# Patient Record
Sex: MALE | Race: WHITE | NOT HISPANIC OR LATINO | Employment: OTHER | URBAN - METROPOLITAN AREA
[De-identification: names, ages, dates, MRNs, and addresses within clinical notes are randomized per-mention and may not be internally consistent; named-entity substitution may affect disease eponyms.]

---

## 2017-03-16 PROBLEM — D04.61 CARCINOMA IN SITU OF SKIN OF RIGHT UPPER LIMB, INCLUDING SHOULDER: Status: ACTIVE | Noted: 2017-03-16

## 2018-12-10 ENCOUNTER — TELEPHONE (OUTPATIENT)
Dept: SCHEDULING | Facility: IMAGING CENTER | Age: 83
End: 2018-12-10

## 2019-01-08 ENCOUNTER — OFFICE VISIT (OUTPATIENT)
Dept: MEDICAL GROUP | Facility: PHYSICIAN GROUP | Age: 84
End: 2019-01-08
Payer: MEDICARE

## 2019-01-08 VITALS
OXYGEN SATURATION: 95 % | WEIGHT: 177 LBS | HEIGHT: 73 IN | TEMPERATURE: 98.9 F | SYSTOLIC BLOOD PRESSURE: 132 MMHG | BODY MASS INDEX: 23.46 KG/M2 | HEART RATE: 69 BPM | DIASTOLIC BLOOD PRESSURE: 70 MMHG | RESPIRATION RATE: 16 BRPM

## 2019-01-08 DIAGNOSIS — R97.20 ELEVATED PSA MEASUREMENT: ICD-10-CM

## 2019-01-08 DIAGNOSIS — Z13.1 ENCOUNTER FOR SCREENING EXAMINATION FOR IMPAIRED GLUCOSE REGULATION AND DIABETES MELLITUS: ICD-10-CM

## 2019-01-08 DIAGNOSIS — H61.22 IMPACTED CERUMEN OF LEFT EAR: ICD-10-CM

## 2019-01-08 DIAGNOSIS — E78.2 MIXED HYPERLIPIDEMIA: ICD-10-CM

## 2019-01-08 PROCEDURE — 99203 OFFICE O/P NEW LOW 30 MIN: CPT | Mod: 25 | Performed by: NURSE PRACTITIONER

## 2019-01-08 PROCEDURE — 69210 REMOVE IMPACTED EAR WAX UNI: CPT | Performed by: NURSE PRACTITIONER

## 2019-01-08 RX ORDER — ASPIRIN 81 MG/1
81 TABLET, CHEWABLE ORAL DAILY
Status: ON HOLD | COMMUNITY
End: 2022-11-01

## 2019-01-08 RX ORDER — SIMVASTATIN 80 MG
80 TABLET ORAL
Qty: 90 TAB | Refills: 3 | Status: SHIPPED | OUTPATIENT
Start: 2019-01-08 | End: 2020-01-03

## 2019-01-08 ASSESSMENT — PATIENT HEALTH QUESTIONNAIRE - PHQ9: CLINICAL INTERPRETATION OF PHQ2 SCORE: 0

## 2019-01-08 NOTE — PROGRESS NOTES
Chief Complaint   Patient presents with   • Labs Only       HISTORY OF THE PRESENT ILLNESS: This is a 83 y.o. male new patient to our practice. This pleasant patient is here today to establish care.     Patient would like to get routine labs. Last labs were done ~ 1 year ago. He notes taking 1 baby aspirin per day. He does report bruising easily which is minor and heals quickly. He has history of hyperlipidemia for which he is taking simvastatin. He requests for refill of this medication. Patient notes previously being evaluated by a urologist who informed him he had elevated PSA. Patient otherwise does not report any symptoms or complaints at this time and is feeling well overall. No chest pain, palpitations, shortness of breath, dizziness, or headaches.      Past Medical History:   Diagnosis Date   • Hyperlipidemia        Past Surgical History:   Procedure Laterality Date   • ATHROPLASTY     • HERNIA REPAIR      inguinal x3   • HIP REPLACEMENT, TOTAL Left        No family status information on file.   History reviewed. No pertinent family history.    Social History   Substance Use Topics   • Smoking status: Former Smoker     Types: Cigarettes     Quit date: 1/8/2003   • Smokeless tobacco: Never Used   • Alcohol use Yes      Comment: Occ        Allergies: Patient has no known allergies.    Current Outpatient Prescriptions Ordered in [facility]   Medication Sig Dispense Refill   • aspirin (ASA) 81 MG Chew Tab chewable tablet Take 81 mg by mouth every day.     • SUPER B COMPLEX/C PO Take  by mouth.     • Multiple Vitamins-Minerals (CENTRUM ADULTS PO) Take  by mouth.     • Cholecalciferol (D3-50 PO) Take  by mouth.     • simvastatin (ZOCOR) 80 MG tablet Take 1 Tab by mouth every bedtime. 90 Tab 3     No current Epic-ordered facility-administered medications on file.        Review of Systems   Constitutional: Negative for fever, chills, weight loss and malaise/fatigue.   Eyes: Negative for blurred vision.   Respiratory:  "Negative for cough, sputum production, shortness of breath and wheezing.    Cardiovascular: Negative for chest pain, palpitations, orthopnea and leg swelling.   Neurological: Negative for dizziness, tingling, tremors, sensory change, focal weakness and headaches.   Endo/Heme/Allergies: Does bruise easily (pt states this is minor and heals quickly).     All other systems reviewed and are negative except as in HPI.    Exam: Blood pressure 132/70, pulse 69, temperature 37.2 °C (98.9 °F), temperature source Temporal, resp. rate 16, height 1.854 m (6' 1\"), weight 80.3 kg (177 lb), SpO2 95 %.  General:  Normal appearing. No distress.  HEENT: Normocephalic. Eyes conjunctiva clear lids without ptosis, pupils equal and reactive to light accommodation, ears normal shape and contour, left ear canal impacted by cerumen, right ear canal is normal. Right TM is benign. Unable to visualize left TM due to cerumen impaction, nasal mucosa benign, oropharynx is without erythema, edema or exudates.   Neck: Supple without JVD. Thyroid is not enlarged.  Pulmonary:  Clear to ausculation.  Normal effort. No rales, ronchi, or wheezing.  Cardiovascular:  Regular rate and rhythm without murmur. Carotid and radial pulses are intact and equal bilaterally.  Neurologic:  Grossly nonfocal  Lymph: No cervical, supraclavicular or axillary lymph nodes are palpable  Skin:  Warm and dry.  No obvious lesions.  Musculoskeletal:  Normal gait. No extremity cyanosis, clubbing, or edema.  Psych:  Normal mood and affect. Alert and oriented x3. Judgment and insight is normal.      Procedure: Cerumen Removal  Risks and benefits of procedure discussed with patient.  Cerumen removed with  lavage   Patient tolerated the procedure well  Pt educated about proper care of ear canal. Q-tip cleaning discouraged, use of debrox and warm water lavage discussed.  Post lavage curette performed by provider, Post procedure exam with clear canal and normal TM.       PLAN:    1. " Mixed hyperlipidemia  - Ordering routine labs  - Counseled on diet and exercise  - Patient requests for refill of his simvastatin.  - Lipid Profile; Future  - simvastatin (ZOCOR) 80 MG tablet; Take 1 Tab by mouth every bedtime.  Dispense: 90 Tab; Refill: 3    2. Encounter for screening examination for impaired glucose regulation and diabetes mellitus  - Ordering routine labs  - Counseled on diet and exercise  - COMP METABOLIC PANEL; Future    3. Elevated PSA measurement  - Ordering updated labs  - PROSTATE SPECIFIC AG     4. Impacted cerumen of left ear  - Performed cerumen removal procedure, as outlined above.    Follow-up in 6 months or sooner as needed. Patient is encouraged to be seen in the emergency room for chest pain, palpitations, shortness of breath, dizziness, severe abdominal pain or other concerning symptoms.     Please note that this dictation was created using voice recognition software. I have made every reasonable attempt to correct obvious errors, but I expect that there are errors of grammar and possibly content that I did not discover before finalizing the note.      Assessment/Plan  1. Mixed hyperlipidemia  Lipid Profile    simvastatin (ZOCOR) 80 MG tablet   2. Encounter for screening examination for impaired glucose regulation and diabetes mellitus  COMP METABOLIC PANEL   3. Elevated PSA measurement  PROSTATE SPECIFIC AG   4. Impacted cerumen of left ear           I have placed the below orders and discussed them with an approved delegating provider. The MA is performing the below orders under the direction of Dr. Alvarez.       Peter PLEITEZ (Scribe), am scribing for, and in the presence of, BRICE Domingo    Electronically signed by: Peter Robin (Donnelle), 1/8/2019    Riley PLEITEZ APRN personally performed the services described in this documentation, as scribed by Peter Robin in my presence, and it is both accurate and complete.

## 2019-01-10 ENCOUNTER — HOSPITAL ENCOUNTER (OUTPATIENT)
Dept: LAB | Facility: MEDICAL CENTER | Age: 84
End: 2019-01-10
Attending: NURSE PRACTITIONER
Payer: MEDICARE

## 2019-01-10 DIAGNOSIS — Z13.1 ENCOUNTER FOR SCREENING EXAMINATION FOR IMPAIRED GLUCOSE REGULATION AND DIABETES MELLITUS: ICD-10-CM

## 2019-01-10 DIAGNOSIS — E78.2 MIXED HYPERLIPIDEMIA: ICD-10-CM

## 2019-01-10 LAB
ALBUMIN SERPL BCP-MCNC: 3.9 G/DL (ref 3.2–4.9)
ALBUMIN/GLOB SERPL: 1.3 G/DL
ALP SERPL-CCNC: 57 U/L (ref 30–99)
ALT SERPL-CCNC: 19 U/L (ref 2–50)
ANION GAP SERPL CALC-SCNC: 10 MMOL/L (ref 0–11.9)
AST SERPL-CCNC: 22 U/L (ref 12–45)
BILIRUB SERPL-MCNC: 0.7 MG/DL (ref 0.1–1.5)
BUN SERPL-MCNC: 19 MG/DL (ref 8–22)
CALCIUM SERPL-MCNC: 9 MG/DL (ref 8.5–10.5)
CHLORIDE SERPL-SCNC: 106 MMOL/L (ref 96–112)
CHOLEST SERPL-MCNC: 140 MG/DL (ref 100–199)
CO2 SERPL-SCNC: 26 MMOL/L (ref 20–33)
CREAT SERPL-MCNC: 0.9 MG/DL (ref 0.5–1.4)
FASTING STATUS PATIENT QL REPORTED: NORMAL
GLOBULIN SER CALC-MCNC: 2.9 G/DL (ref 1.9–3.5)
GLUCOSE SERPL-MCNC: 97 MG/DL (ref 65–99)
HDLC SERPL-MCNC: 51 MG/DL
LDLC SERPL CALC-MCNC: 78 MG/DL
POTASSIUM SERPL-SCNC: 3.7 MMOL/L (ref 3.6–5.5)
PROT SERPL-MCNC: 6.8 G/DL (ref 6–8.2)
SODIUM SERPL-SCNC: 142 MMOL/L (ref 135–145)
TRIGL SERPL-MCNC: 57 MG/DL (ref 0–149)

## 2019-01-10 PROCEDURE — 36415 COLL VENOUS BLD VENIPUNCTURE: CPT

## 2019-01-10 PROCEDURE — 80061 LIPID PANEL: CPT

## 2019-01-10 PROCEDURE — 80053 COMPREHEN METABOLIC PANEL: CPT

## 2019-01-11 LAB — PSA SERPL-MCNC: 13.2 NG/ML (ref 0–4)

## 2019-01-16 ENCOUNTER — TELEPHONE (OUTPATIENT)
Dept: MEDICAL GROUP | Facility: PHYSICIAN GROUP | Age: 84
End: 2019-01-16

## 2019-01-16 NOTE — TELEPHONE ENCOUNTER
----- Message from COLEEN Keller sent at 1/16/2019  7:23 AM PST -----  Please call pt and give lab results: Labs are 100% within normal limits.

## 2019-01-16 NOTE — TELEPHONE ENCOUNTER
Phone Number Called: [phone] (home)       Message: unable to leave a voicemail     Left Message for patient to call back: no

## 2019-01-16 NOTE — TELEPHONE ENCOUNTER
----- Message from COLEEN Keller sent at 1/16/2019 11:03 AM PST -----  Please call pt and give lab results: PSA is elevated 13.2. Is this consistent with previous numbers? I have not received your last PSA record yet.

## 2019-01-16 NOTE — TELEPHONE ENCOUNTER
Phone Number Called: [phone] (home)       Message: No answer, will try again later    Left Message for patient to call back: N\A

## 2020-01-03 DIAGNOSIS — E78.2 MIXED HYPERLIPIDEMIA: ICD-10-CM

## 2020-01-03 RX ORDER — SIMVASTATIN 80 MG
TABLET ORAL
Qty: 90 TAB | Refills: 0 | Status: SHIPPED | OUTPATIENT
Start: 2020-01-03 | End: 2020-04-03

## 2020-01-03 NOTE — TELEPHONE ENCOUNTER
Was the patient seen in the last year in this department? Yes    Does patient have an active prescription for medications requested? Yes    Received Request Via: Pharmacy     Future Appointments       Provider Department Center    1/9/2020 10:00 AM COLEEN Keller; [place] [facility] - [address]

## 2020-01-09 ENCOUNTER — APPOINTMENT (OUTPATIENT)
Dept: MEDICAL GROUP | Facility: PHYSICIAN GROUP | Age: 85
End: 2020-01-09
Payer: MEDICARE

## 2020-02-20 ENCOUNTER — OFFICE VISIT (OUTPATIENT)
Dept: MEDICAL GROUP | Facility: PHYSICIAN GROUP | Age: 85
End: 2020-02-20
Payer: MEDICARE

## 2020-02-20 VITALS
DIASTOLIC BLOOD PRESSURE: 72 MMHG | SYSTOLIC BLOOD PRESSURE: 132 MMHG | BODY MASS INDEX: 22.56 KG/M2 | OXYGEN SATURATION: 95 % | HEART RATE: 66 BPM | WEIGHT: 171 LBS | RESPIRATION RATE: 16 BRPM | TEMPERATURE: 97.6 F

## 2020-02-20 DIAGNOSIS — L98.9 NON-HEALING SKIN LESION: ICD-10-CM

## 2020-02-20 DIAGNOSIS — Z12.5 SCREENING FOR PROSTATE CANCER: ICD-10-CM

## 2020-02-20 DIAGNOSIS — E78.2 MIXED HYPERLIPIDEMIA: ICD-10-CM

## 2020-02-20 DIAGNOSIS — R97.20 ELEVATED PSA MEASUREMENT: ICD-10-CM

## 2020-02-20 DIAGNOSIS — Z00.00 MEDICARE ANNUAL WELLNESS VISIT, SUBSEQUENT: Primary | ICD-10-CM

## 2020-02-20 PROCEDURE — G0439 PPPS, SUBSEQ VISIT: HCPCS | Performed by: NURSE PRACTITIONER

## 2020-02-20 RX ORDER — OMEPRAZOLE 20 MG/1
20 CAPSULE, DELAYED RELEASE ORAL DAILY
COMMUNITY
End: 2024-01-01 | Stop reason: SDUPTHER

## 2020-02-20 ASSESSMENT — ENCOUNTER SYMPTOMS: GENERAL WELL-BEING: EXCELLENT

## 2020-02-20 ASSESSMENT — PATIENT HEALTH QUESTIONNAIRE - PHQ9: CLINICAL INTERPRETATION OF PHQ2 SCORE: 0

## 2020-02-20 ASSESSMENT — ACTIVITIES OF DAILY LIVING (ADL): BATHING_REQUIRES_ASSISTANCE: 0

## 2020-02-20 NOTE — PROGRESS NOTES
Chief Complaint   Patient presents with   • Annual Wellness Visit         HPI:  Moshe Guerrero is a 84 y.o. here for Medicare Annual Wellness Visit and to discuss the following:    Non-healing skin lesion  In 2004, the patient was carrying a small dog cage and tripped down a small flight of stairs causing him to fall into the cage. He has a small residual spot located to his upper chest that he is concerned about. He has noticed this spot has been changing in appearance and has never healed fully since his incident.    Elevated PSA measurement  His PSA value in 2016 was 5.4 and his last reading in 1/2019 was 13.2. He does not report any symptoms associated with an elevated PSA. He denies frequent urination at night.  He has not recently seen urology.     Mixed hyperlipidemia  He has been taking simvastatin 80 mg as prescribed for his dyslipidemia without myalgias or other side effects.       Patient Active Problem List    Diagnosis Date Noted   • Mixed hyperlipidemia 01/08/2019   • Impacted cerumen of left ear 01/08/2019   • Elevated PSA measurement 01/08/2019       Current Outpatient Medications   Medication Sig Dispense Refill   • omeprazole (PRILOSEC) 20 MG delayed-release capsule Take 20 mg by mouth every day.     • simvastatin (ZOCOR) 80 MG tablet TAKE  ONE TABLET BY MOUTH NIGHTLY AT BEDTIME 90 Tab 0   • aspirin (ASA) 81 MG Chew Tab chewable tablet Take 81 mg by mouth every day.     • SUPER B COMPLEX/C PO Take  by mouth.     • Multiple Vitamins-Minerals (CENTRUM ADULTS PO) Take  by mouth.     • Cholecalciferol (D3-50 PO) Take  by mouth.       No current facility-administered medications for this visit.             Current supplements as per medication list.       Allergies: Patient has no known allergies.    Current social contact/activities: Family Friends/      He  reports that he quit smoking about 17 years ago. His smoking use included cigarettes. He has never used smokeless tobacco. He reports current  alcohol use. He reports that he does not use drugs.  Counseling given: Yes      DPA/Advanced Directive:  Patient does not have an Advanced Directive.  A packet and workshop information was given on Advanced Directives.    ROS:    Gait: Uses no assistive device  Ostomy: No  Other tubes: No  Amputations: No  Chronic oxygen use: No  Last eye exam: 2010  Wears hearing aids: No   : Denies any urinary leakage during the last 6 months     Annual Health Assessment Questions:    1.  Are you currently engaging in any exercise or physical activity? No    2.  How would you describe your mood or emotional well-being today? good    3.  Have you had any falls in the last year? No    4.  Have you noticed any problems with your balance or had difficulty walking? No    5.  In the last six months have you experienced any leakage of urine? No    6. DPA/Advanced Directive: Patient has Advanced Directive, but it is not on file. Instructed to bring in a copy to scan into their chart.    Screening:  Annual Exam   Depression Screening    Little interest or pleasure in doing things?  0 - not at all  Feeling down, depressed , or hopeless? 0 - not at all  Patient Health Questionnaire Score: 0     If depressive symptoms identified deferred to follow up visit unless specifically addressed in assessment and plan.    Interpretation of PHQ-9 Total Score   Score Severity   1-4 No Depression   5-9 Mild Depression   10-14 Moderate Depression   15-19 Moderately Severe Depression   20-27 Severe Depression    Screening for Cognitive Impairment    Three Minute Recall (village, kitchen, baby) 3/3    Gabo clock face with all 12 numbers and set the hands to show 10 past 10.    Pt blind   Cognitive concerns identified deferred for follow up unless specifically addressed in assessment and plan.    Fall Risk Assessment    Has the patient had two or more falls in the last year or any fall with injury in the last year?  No    Safety Assessment    Throw rugs on  floor.  No  Handrails on all stairs.  Yes  Good lighting in all hallways.  Yes  Difficulty hearing.  No  Patient counseled about all safety risks that were identified.    Functional Assessment ADLs    Are there any barriers preventing you from cooking for yourself or meeting nutritional needs?  No.    Are there any barriers preventing you from driving safely or obtaining transportation?  No.    Are there any barriers preventing you from using a telephone or calling for help?  No.    Are there any barriers preventing you from shopping?  No.    Are there any barriers preventing you from taking care of your own finances?  No.    Are there any barriers preventing you from managing your medications?  No.    Are there any barriers preventing you from showering, bathing or dressing yourself?  No.    Are you currently engaging in any exercise or physical activity?  Yes.  Working out in the morning.     What is your perception of your health?  Excellent.      Health Maintenance Summary                Annual Wellness Visit Overdue 1935     IMM ZOSTER VACCINES Postponed 2020 Originally 9/15/1985. Patient Refused    IMM INFLUENZA Postponed 2021 Originally 2019. Patient Refused    IMM DTaP/Tdap/Td Vaccine Postponed 2021 Originally 9/15/1946. Patient Refused    IMM PNEUMOCOCCAL VACCINE: 65+ Years Postponed 2021 Originally 9/15/2000. Patient Refused    COLONOSCOPY Next Due 2023      Done 2013 REFERRAL TO GI FOR COLONOSCOPY          Patient Care Team:  COLEEN Keller as PCP - General (Family Medicine)        Social History     Tobacco Use   • Smoking status: Former Smoker     Types: Cigarettes     Last attempt to quit: 2003     Years since quittin.1   • Smokeless tobacco: Never Used   Substance Use Topics   • Alcohol use: Yes     Comment: Occ    • Drug use: No     History reviewed. No pertinent family history.  He  has a past medical history of Hyperlipidemia.    Past Surgical History:   Procedure Laterality Date   • ATHROPLASTY     • HERNIA REPAIR      inguinal x3   • HIP REPLACEMENT, TOTAL Left        Exam:   /72 (BP Location: Left arm, Patient Position: Sitting, BP Cuff Size: Adult)   Pulse 66   Temp 36.4 °C (97.6 °F) (Temporal)   Resp 16   Wt 77.6 kg (171 lb)   SpO2 95%  Body mass index is 22.56 kg/m².    Hearing fair.    Dentition fair  Alert, oriented in no acute distress.  Eye contact is good, speech goal directed, affect calm  Pulmonary:  Clear to ausculation.  Normal effort. No rales, ronchi, or wheezing.  Cardiovascular:  Regular rate and rhythm without murmur.  Skin: 2 cm x 1 cm scaly scabbed red lesion. There is another 0.5 red scaly non healing lesion inferior to the prior.     PLAN:    1. Non-healing skin lesion  New issue to me today. Patient is referred to dermatology for further assessment. There are concerns for possible basal cell or squamous carcinoma.  - REFERRAL TO DERMATOLOGY    2. Elevated PSA measurement  His PSA value in 2016 was 5.4 and his last reading in 1/2019 was 13.2. We will establish a new baseline and treat accordingly. Labs have been ordered for the next follow up visit.   - PROSTATE SPECIFIC AG SCREENING; Future    3. Mixed hyperlipidemia  His lipid panel values are all within goal from 1/2019. Stable on current simvastatin dosage. Labs have been ordered for the next follow up visit.    - Comp Metabolic Panel; Future  - Lipid Profile; Future    4. Screening for prostate cancer  See #2. Screening lab work ordered.   - PROSTATE SPECIFIC AG SCREENING; Future     Patient is encouraged to be seen in the emergency room for chest pain, palpitations, shortness of breath, dizziness, severe abdominal pain or other concerning symptoms.     Assessment and Plan. The following treatment and monitoring plan is recommended:    1. Non-healing skin lesion  REFERRAL TO DERMATOLOGY   2. Elevated PSA measurement  PROSTATE SPECIFIC AG SCREENING    3. Mixed hyperlipidemia  Comp Metabolic Panel    Lipid Profile   4. Screening for prostate cancer  PROSTATE SPECIFIC AG SCREENING         Services suggested: No services needed at this time  Health Care Screening: Age-appropriate preventive services recommended by USPTF and ACIP covered by Medicare were discussed today. Services ordered if indicated and agreed upon by the patient.  Referrals offered: Community-based lifestyle interventions to reduce health risks and promote self-management and wellness, fall prevention, nutrition, physical activity, tobacco-use cessation, weight loss, and mental health services as per orders if indicated.    Discussion today about general wellness and lifestyle habits:    · Prevent falls and reduce trip hazards; Cautioned about securing or removing rugs.  · Have a working fire alarm and carbon monoxide detector;   · Engage in regular physical activity and social activities     Follow-up: No follow-ups on file.

## 2020-03-06 LAB
ALBUMIN SERPL-MCNC: 4.2 G/DL (ref 3.6–4.6)
ALBUMIN/GLOB SERPL: 1.8 {RATIO} (ref 1.2–2.2)
ALP SERPL-CCNC: 75 IU/L (ref 39–117)
ALT SERPL-CCNC: 18 IU/L (ref 0–44)
AST SERPL-CCNC: 23 IU/L (ref 0–40)
BILIRUB SERPL-MCNC: 0.5 MG/DL (ref 0–1.2)
BUN SERPL-MCNC: 23 MG/DL (ref 8–27)
BUN/CREAT SERPL: 28 (ref 10–24)
CALCIUM SERPL-MCNC: 8.9 MG/DL (ref 8.6–10.2)
CHLORIDE SERPL-SCNC: 105 MMOL/L (ref 96–106)
CHOLEST SERPL-MCNC: 160 MG/DL (ref 100–199)
CO2 SERPL-SCNC: 22 MMOL/L (ref 20–29)
CREAT SERPL-MCNC: 0.82 MG/DL (ref 0.76–1.27)
GLOBULIN SER CALC-MCNC: 2.4 G/DL (ref 1.5–4.5)
GLUCOSE SERPL-MCNC: 98 MG/DL (ref 65–99)
HDLC SERPL-MCNC: 53 MG/DL
LABORATORY COMMENT REPORT: NORMAL
LDLC SERPL CALC-MCNC: 93 MG/DL (ref 0–99)
POTASSIUM SERPL-SCNC: 4.5 MMOL/L (ref 3.5–5.2)
PROT SERPL-MCNC: 6.6 G/DL (ref 6–8.5)
PSA SERPL-MCNC: 14.8 NG/ML (ref 0–4)
SODIUM SERPL-SCNC: 141 MMOL/L (ref 134–144)
TRIGL SERPL-MCNC: 70 MG/DL (ref 0–149)
VLDLC SERPL CALC-MCNC: 14 MG/DL (ref 5–40)

## 2020-03-09 ENCOUNTER — TELEPHONE (OUTPATIENT)
Dept: MEDICAL GROUP | Facility: PHYSICIAN GROUP | Age: 85
End: 2020-03-09

## 2020-03-09 DIAGNOSIS — R97.20 ELEVATED PSA: ICD-10-CM

## 2020-03-09 NOTE — TELEPHONE ENCOUNTER
VOICEMAIL  1. Caller Name: Bo Howell                      Call Back Number: [phone]    2. Message: CB about Labs           Phone Number Called: [phone]        Call outcome: Spoke to Sherrie Howell     Message: Informed Sherrie, Pt's results per Riley.  Informed of Urology referral.  Informed [facility] [place] will call and set up appt.  Sherrie understood, no questions at this time.

## 2020-03-09 NOTE — TELEPHONE ENCOUNTER
----- Message from COLEEN Keller sent at 3/9/2020  9:39 AM PDT -----  Please call PATIENTS DAUGHTER and give lab results: Overall labs look great but the PSA is increased from last year as such I am sending a referral to urology.

## 2020-03-09 NOTE — TELEPHONE ENCOUNTER
Phone Number Called: [phone] [place]      Call outcome: Did not leave a detailed message. Requested patient to call back.    Message: LVM to call back.

## 2020-03-12 NOTE — TELEPHONE ENCOUNTER
Phone Number Called: [phone] Kristie             Call outcome: Spoke to patient regarding message below.    Message: Spoke with patient's niece Lina and let them know COLEEN Keller's message.

## 2020-03-20 ENCOUNTER — APPOINTMENT (RX ONLY)
Dept: URBAN - METROPOLITAN AREA CLINIC 20 | Facility: CLINIC | Age: 85
Setting detail: DERMATOLOGY
End: 2020-03-20

## 2020-03-20 DIAGNOSIS — D22 MELANOCYTIC NEVI: ICD-10-CM

## 2020-03-20 DIAGNOSIS — D18.0 HEMANGIOMA: ICD-10-CM

## 2020-03-20 DIAGNOSIS — L81.4 OTHER MELANIN HYPERPIGMENTATION: ICD-10-CM

## 2020-03-20 DIAGNOSIS — L82.1 OTHER SEBORRHEIC KERATOSIS: ICD-10-CM

## 2020-03-20 DIAGNOSIS — L57.0 ACTINIC KERATOSIS: ICD-10-CM

## 2020-03-20 PROBLEM — D22.5 MELANOCYTIC NEVI OF TRUNK: Status: ACTIVE | Noted: 2020-03-20

## 2020-03-20 PROBLEM — D48.5 NEOPLASM OF UNCERTAIN BEHAVIOR OF SKIN: Status: ACTIVE | Noted: 2020-03-20

## 2020-03-20 PROBLEM — D18.01 HEMANGIOMA OF SKIN AND SUBCUTANEOUS TISSUE: Status: ACTIVE | Noted: 2020-03-20

## 2020-03-20 PROCEDURE — 17003 DESTRUCT PREMALG LES 2-14: CPT

## 2020-03-20 PROCEDURE — 99203 OFFICE O/P NEW LOW 30 MIN: CPT | Mod: 25

## 2020-03-20 PROCEDURE — 11102 TANGNTL BX SKIN SINGLE LES: CPT

## 2020-03-20 PROCEDURE — 17000 DESTRUCT PREMALG LESION: CPT | Mod: 59

## 2020-03-20 PROCEDURE — ? BIOPSY BY SHAVE METHOD

## 2020-03-20 PROCEDURE — 11103 TANGNTL BX SKIN EA SEP/ADDL: CPT

## 2020-03-20 PROCEDURE — ? LIQUID NITROGEN

## 2020-03-20 PROCEDURE — ? COUNSELING

## 2020-03-20 ASSESSMENT — LOCATION DETAILED DESCRIPTION DERM
LOCATION DETAILED: RIGHT SUPERIOR LATERAL MIDBACK
LOCATION DETAILED: LEFT MEDIAL SUPERIOR CHEST
LOCATION DETAILED: RIGHT SUPERIOR LATERAL MALAR CHEEK
LOCATION DETAILED: LEFT PROXIMAL DORSAL FOREARM
LOCATION DETAILED: STERNUM
LOCATION DETAILED: LEFT INFERIOR UPPER BACK
LOCATION DETAILED: RIGHT MEDIAL INFERIOR CHEST
LOCATION DETAILED: LEFT INFERIOR CENTRAL MALAR CHEEK
LOCATION DETAILED: RIGHT LATERAL MALAR CHEEK

## 2020-03-20 ASSESSMENT — LOCATION SIMPLE DESCRIPTION DERM
LOCATION SIMPLE: LEFT CHEEK
LOCATION SIMPLE: CHEST
LOCATION SIMPLE: RIGHT CHEEK
LOCATION SIMPLE: LEFT FOREARM
LOCATION SIMPLE: LEFT UPPER BACK
LOCATION SIMPLE: RIGHT LOWER BACK

## 2020-03-20 ASSESSMENT — LOCATION ZONE DERM
LOCATION ZONE: FACE
LOCATION ZONE: TRUNK
LOCATION ZONE: ARM

## 2020-03-20 NOTE — PROCEDURE: BIOPSY BY SHAVE METHOD

## 2020-04-03 DIAGNOSIS — E78.2 MIXED HYPERLIPIDEMIA: ICD-10-CM

## 2020-04-06 RX ORDER — SIMVASTATIN 80 MG
TABLET ORAL
Qty: 90 TAB | Refills: 2 | Status: SHIPPED | OUTPATIENT
Start: 2020-04-06 | End: 2021-01-18

## 2020-12-14 ENCOUNTER — TELEPHONE (OUTPATIENT)
Dept: MEDICAL GROUP | Facility: PHYSICIAN GROUP | Age: 85
End: 2020-12-14

## 2020-12-14 DIAGNOSIS — E78.2 MIXED HYPERLIPIDEMIA: ICD-10-CM

## 2020-12-14 DIAGNOSIS — R97.20 ELEVATED PSA: ICD-10-CM

## 2020-12-14 DIAGNOSIS — Z13.1 ENCOUNTER FOR SCREENING EXAMINATION FOR IMPAIRED GLUCOSE REGULATION AND DIABETES MELLITUS: ICD-10-CM

## 2020-12-15 NOTE — TELEPHONE ENCOUNTER
VOICEMAIL  1. Caller Name: Deidre Howell                      Call Back Number: [phone] (home)     2. Message: Called for Uncle, has just scheduled Pt for Annual exam in February, he has three labs ordered from last year, to  in Feb.  Did PCP want to add any new orders to go with the old, and would PCP like to have the labs done prior to Annual

## 2021-01-11 DIAGNOSIS — Z23 NEED FOR VACCINATION: ICD-10-CM

## 2021-01-17 DIAGNOSIS — E78.2 MIXED HYPERLIPIDEMIA: ICD-10-CM

## 2021-01-18 RX ORDER — SIMVASTATIN 80 MG
TABLET ORAL
Qty: 90 TAB | Refills: 0 | Status: SHIPPED | OUTPATIENT
Start: 2021-01-18 | End: 2021-04-12

## 2021-01-19 NOTE — TELEPHONE ENCOUNTER
Pt's contact Deidre called office requesting refill for simvastatin for Pt. Deidre called back and informed prescripton refill has been sent to designated pharmacy. She verbalized understanding.

## 2021-02-18 ENCOUNTER — HOSPITAL ENCOUNTER (OUTPATIENT)
Dept: LAB | Facility: MEDICAL CENTER | Age: 86
End: 2021-02-18
Attending: NURSE PRACTITIONER
Payer: MEDICARE

## 2021-02-18 DIAGNOSIS — R97.20 ELEVATED PSA: ICD-10-CM

## 2021-02-18 DIAGNOSIS — Z13.1 ENCOUNTER FOR SCREENING EXAMINATION FOR IMPAIRED GLUCOSE REGULATION AND DIABETES MELLITUS: ICD-10-CM

## 2021-02-18 DIAGNOSIS — E78.2 MIXED HYPERLIPIDEMIA: ICD-10-CM

## 2021-02-18 LAB
ALBUMIN SERPL BCP-MCNC: 3.7 G/DL (ref 3.2–4.9)
ALBUMIN/GLOB SERPL: 1.1 G/DL
ALP SERPL-CCNC: 95 U/L (ref 30–99)
ALT SERPL-CCNC: 10 U/L (ref 2–50)
ANION GAP SERPL CALC-SCNC: 11 MMOL/L (ref 7–16)
AST SERPL-CCNC: 18 U/L (ref 12–45)
BILIRUB SERPL-MCNC: 0.5 MG/DL (ref 0.1–1.5)
BUN SERPL-MCNC: 16 MG/DL (ref 8–22)
CALCIUM SERPL-MCNC: 9 MG/DL (ref 8.5–10.5)
CHLORIDE SERPL-SCNC: 104 MMOL/L (ref 96–112)
CHOLEST SERPL-MCNC: 145 MG/DL (ref 100–199)
CO2 SERPL-SCNC: 22 MMOL/L (ref 20–33)
CREAT SERPL-MCNC: 0.64 MG/DL (ref 0.5–1.4)
GLOBULIN SER CALC-MCNC: 3.5 G/DL (ref 1.9–3.5)
GLUCOSE SERPL-MCNC: 98 MG/DL (ref 65–99)
HDLC SERPL-MCNC: 56 MG/DL
LDLC SERPL CALC-MCNC: 76 MG/DL
POTASSIUM SERPL-SCNC: 3.9 MMOL/L (ref 3.6–5.5)
PROT SERPL-MCNC: 7.2 G/DL (ref 6–8.2)
PSA SERPL-MCNC: 22.4 NG/ML (ref 0–4)
SODIUM SERPL-SCNC: 137 MMOL/L (ref 135–145)
TRIGL SERPL-MCNC: 67 MG/DL (ref 0–149)

## 2021-02-18 PROCEDURE — 36415 COLL VENOUS BLD VENIPUNCTURE: CPT

## 2021-02-18 PROCEDURE — 80061 LIPID PANEL: CPT

## 2021-02-18 PROCEDURE — 84153 ASSAY OF PSA TOTAL: CPT

## 2021-02-18 PROCEDURE — 80053 COMPREHEN METABOLIC PANEL: CPT

## 2021-02-19 ENCOUNTER — TELEPHONE (OUTPATIENT)
Dept: MEDICAL GROUP | Facility: PHYSICIAN GROUP | Age: 86
End: 2021-02-19

## 2021-02-19 NOTE — TELEPHONE ENCOUNTER
Left message for patient to call back regarding pre-visit planning. Please transfer call to [phone].

## 2021-02-19 NOTE — TELEPHONE ENCOUNTER
Left message for patient to call back regarding pre-visit planning. Please transfer call to [phone].

## 2021-02-22 ENCOUNTER — OFFICE VISIT (OUTPATIENT)
Dept: MEDICAL GROUP | Facility: PHYSICIAN GROUP | Age: 86
End: 2021-02-22
Payer: MEDICARE

## 2021-02-22 VITALS
SYSTOLIC BLOOD PRESSURE: 144 MMHG | DIASTOLIC BLOOD PRESSURE: 60 MMHG | HEART RATE: 69 BPM | RESPIRATION RATE: 20 BRPM | BODY MASS INDEX: 19.74 KG/M2 | OXYGEN SATURATION: 98 % | TEMPERATURE: 98.5 F | WEIGHT: 153.8 LBS | HEIGHT: 74 IN

## 2021-02-22 DIAGNOSIS — E78.2 MIXED HYPERLIPIDEMIA: ICD-10-CM

## 2021-02-22 DIAGNOSIS — Z00.00 MEDICARE ANNUAL WELLNESS VISIT, SUBSEQUENT: Primary | ICD-10-CM

## 2021-02-22 DIAGNOSIS — R97.20 ELEVATED PSA MEASUREMENT: ICD-10-CM

## 2021-02-22 DIAGNOSIS — Z91.81 RISK FOR FALLS: ICD-10-CM

## 2021-02-22 PROCEDURE — G0439 PPPS, SUBSEQ VISIT: HCPCS | Performed by: NURSE PRACTITIONER

## 2021-02-22 PROCEDURE — 69210 REMOVE IMPACTED EAR WAX UNI: CPT | Performed by: NURSE PRACTITIONER

## 2021-02-22 ASSESSMENT — PATIENT HEALTH QUESTIONNAIRE - PHQ9: CLINICAL INTERPRETATION OF PHQ2 SCORE: 0

## 2021-02-22 ASSESSMENT — ACTIVITIES OF DAILY LIVING (ADL): BATHING_REQUIRES_ASSISTANCE: 0

## 2021-02-22 ASSESSMENT — ENCOUNTER SYMPTOMS: GENERAL WELL-BEING: GOOD

## 2021-02-22 NOTE — ASSESSMENT & PLAN NOTE
States one fall with a fast turn. States he is overall doing well and his cane keeps him steady. Daughter states his gait is steady.

## 2021-02-22 NOTE — PROGRESS NOTES
Chief Complaint   Patient presents with   • Annual Wellness Visit         HPI:  Moshe is a 85 y.o. here for Medicare Annual Wellness Visit    Risk for falls  States one fall with a fast turn. States he is overall doing well and his cane keeps him steady. Daughter states his gait is steady.     Mixed hyperlipidemia  Chronic in nature. Stable. States he is overall doing well.     Elevated PSA measurement  Chronic in nature. Stable. States he saw urology a couple years ago, reported not to be concerned unless it jumps up suddenly. Will monitor.        Patient Active Problem List    Diagnosis Date Noted   • Risk for falls 02/22/2021   • Mixed hyperlipidemia 01/08/2019   • Impacted cerumen of left ear 01/08/2019   • Elevated PSA measurement 01/08/2019       Current Outpatient Medications   Medication Sig Dispense Refill   • simvastatin (ZOCOR) 80 MG tablet TAKE  ONE TABLET BY MOUTH NIGHTLY AT BEDTIME 90 Tab 0   • omeprazole (PRILOSEC) 20 MG delayed-release capsule Take 20 mg by mouth every day.     • aspirin (ASA) 81 MG Chew Tab chewable tablet Take 81 mg by mouth every day.     • SUPER B COMPLEX/C PO Take  by mouth.     • Multiple Vitamins-Minerals (CENTRUM ADULTS PO) Take  by mouth.     • Cholecalciferol (D3-50 PO) Take  by mouth.       No current facility-administered medications for this visit.        Patient is taking medications as noted in medication list.  Current supplements as per medication list.     Allergies: Patient has no known allergies.    Current social contact/activities: Talking to friends and family by the phone     Is patient current with immunizations? No, due for COVID 19. Patient is interested in receiving NONE today.    He  reports that he quit smoking about 18 years ago. His smoking use included cigarettes. He has never used smokeless tobacco. He reports current alcohol use. He reports that he does not use drugs.  Counseling given: Not Answered        DPA/Advanced directive: Patient does not  have an Advanced Directive.  A packet and workshop information was given on Advanced Directives.    ROS:    Gait: Uses a cane   Ostomy: No   Other tubes: No   Amputations: No   Chronic oxygen use No   Last eye exam 2004   Wears hearing aids: No   : Reports urinary leakage during the last 6 months that has somewhat interfered with their daily activities or sleep.      Screening:        Depression Screening    Little interest or pleasure in doing things?  0 - not at all  Feeling down, depressed, or hopeless? 0 - not at all  Trouble falling or staying asleep, or sleeping too much?     Feeling tired or having little energy?     Poor appetite or overeating?     Feeling bad about yourself - or that you are a failure or have let yourself or your family down?    Trouble concentrating on things, such as reading the newspaper or watching television?    Moving or speaking so slowly that other people could have noticed.  Or the opposite - being so fidgety or restless that you have been moving around a lot more than usual?     Thoughts that you would be better off dead, or of hurting yourself?     Patient Health Questionnaire Score:        If depressive symptoms identified deferred to follow up visit unless specifically addressed in assessment and plan.    Interpretation of PHQ-9 Total Score   Score Severity   1-4 No Depression   5-9 Mild Depression   10-14 Moderate Depression   15-19 Moderately Severe Depression   20-27 Severe Depression      Screening for Cognitive Impairment    Three Minute Recall (river, gladys, finger)  2/3    Draw clock face with all 12 numbers and set the hands to show 10 past 11.  No Pt unable to see, Macular Dengeneration   If cognitive concerns identified, deferred for follow up unless specifically addressed in assessment and plan.    Fall Risk Assessment    Has the patient had two or more falls in the last year or any fall with injury in the last year?  Yes  If fall risk identified, deferred for  follow up unless specifically addressed in assessment and plan.      Safety Assessment    Throw rugs on floor.  Yes  Handrails on all stairs.  Yes  Good lighting in all hallways.  Yes  Difficulty hearing.  No  Patient counseled about all safety risks that were identified.    Functional Assessment ADLs    Are there any barriers preventing you from cooking for yourself or meeting nutritional needs?  No.    Are there any barriers preventing you from driving safely or obtaining transportation?  No.    Are there any barriers preventing you from using a telephone or calling for help?  No.    Are there any barriers preventing you from shopping?  No.    Are there any barriers preventing you from taking care of your own finances?  No.    Are there any barriers preventing you from managing your medications?    No.    Are there any barriers preventing you from showering, bathing or dressing yourself?  No.    Are you currently engaging in any exercise or physical activity?  Yes.  Has a PT exercise routine daily, doing chores around the house    What is your perception of your health?  Good.    Health Maintenance Summary                COVID-19 Vaccine Overdue 9/15/1951     Annual Wellness Visit Overdue 2/20/2021      Done 2/20/2020 LOS: OK ANNUAL WELLNESS VISIT-INCLUDES PPPS SUBSEQUE*     Patient has more history with this topic...    IMM ZOSTER VACCINES Postponed 7/22/2021 Originally 9/15/1985. Patient Refused    IMM INFLUENZA Postponed 2/22/2022 Originally 9/1/2020. Patient Refused    IMM DTaP/Tdap/Td Vaccine Postponed 2/22/2022 Originally 9/15/1954. Patient Refused    IMM PNEUMOCOCCAL VACCINE: 65+ Years Postponed 2/22/2022 Originally 9/15/2000. Patient Refused    COLONOSCOPY Next Due 9/26/2023      Done 9/26/2013 REFERRAL TO GI FOR COLONOSCOPY          Patient Care Team:  THEA KellerPEduardoREduardoN. as PCP - General (Family Medicine)      Social History     Tobacco Use   • Smoking status: Former Smoker      "Types: Cigarettes     Quit date: 2003     Years since quittin.1   • Smokeless tobacco: Never Used   Substance Use Topics   • Alcohol use: Yes     Comment: Occ    • Drug use: No     No family history on file.  He  has a past medical history of Hyperlipidemia.   Past Surgical History:   Procedure Laterality Date   • ARTHROPLASTY     • HERNIA REPAIR      inguinal x3   • HIP REPLACEMENT, TOTAL Left          Exam:     /60 (BP Location: Left arm, Patient Position: Sitting, BP Cuff Size: Adult)   Pulse 69   Temp 36.9 °C (98.5 °F) (Temporal)   Resp 20   Ht 1.867 m (6' 1.5\")   Wt 69.8 kg (153 lb 12.8 oz)   SpO2 98%  Body mass index is 20.02 kg/m².    Hearing excellent.    Dentition good  Alert, oriented in no acute distress.  Eye contact is good, speech goal directed, affect calm    Procedure: Cerumen Removal  Risks and benefits of procedure discussed with patient.  Cerumen removed with  lavage   Patient tolerated the procedure well  Pt educated about proper care of ear canal. Q-tip cleaning discouraged, use of debrox and warm water lavage discussed.  Post lavage curette performed by provider, Post procedure exam with clear canal and normal TM.      Assessment and Plan. The following treatment and monitoring plan is recommended:  Continue current follow-up.   1. Risk for falls  Patient identified as fall risk.  Appropriate orders and counseling given.   2. Elevated PSA measurement     3. Mixed hyperlipidemia           Services suggested: No services needed at this time  Health Care Screening recommendations as per orders if indicated.  Referrals offered: PT/OT/Nutrition counseling/Behavioral Health/Smoking cessation as per orders if indicated.    Discussion today about general wellness and lifestyle habits:    · Prevent falls and reduce trip hazards; Cautioned about securing or removing rugs.  · Have a working fire alarm and carbon monoxide detector;   · Engage in regular physical activity and social " activities       Follow-up: Return in about 6 months (around 8/22/2021), or if symptoms worsen or fail to improve.

## 2021-02-22 NOTE — ASSESSMENT & PLAN NOTE
Chronic in nature. Stable. States he saw urology a couple years ago, reported not to be concerned unless it jumps up suddenly. Will monitor.

## 2021-04-07 ENCOUNTER — TELEPHONE (OUTPATIENT)
Dept: MEDICAL GROUP | Facility: PHYSICIAN GROUP | Age: 86
End: 2021-04-07

## 2021-04-07 NOTE — TELEPHONE ENCOUNTER
Phone Number Called: [phone]    Call outcome: spoke with yuan Mills, [employer] on account    Message: Informed niece we do not keep the vaccines in office as they have a specific storage requirement.  Informed her would need to set the appt up through [employer], and since the Pt did not have [employer] provided her with [employer] support phone number to see if they could help her schedule the Pt for his vaccine. Transferred her call over to [employer] Support.  No further actions required.

## 2021-04-07 NOTE — TELEPHONE ENCOUNTER
[employer]IC[employer]  1. Caller Name: yuan Howell                      Call Back Number: [phone]    2. Message: Called to say Uncle wants to have the COVID vaccine, and wanted to know if he could have it done in office here.  She asked for a cb.

## 2021-04-13 ENCOUNTER — IMMUNIZATION (OUTPATIENT)
Dept: FAMILY PLANNING/WOMEN'S HEALTH CLINIC | Facility: IMMUNIZATION CENTER | Age: 86
End: 2021-04-13
Payer: MEDICARE

## 2021-04-13 DIAGNOSIS — Z23 ENCOUNTER FOR VACCINATION: Primary | ICD-10-CM

## 2021-04-13 PROCEDURE — 0001A PFIZER SARS-COV-2 VACCINE: CPT

## 2021-04-13 PROCEDURE — 91300 PFIZER SARS-COV-2 VACCINE: CPT

## 2021-05-06 ENCOUNTER — IMMUNIZATION (OUTPATIENT)
Dept: FAMILY PLANNING/WOMEN'S HEALTH CLINIC | Facility: IMMUNIZATION CENTER | Age: 86
End: 2021-05-06
Payer: MEDICARE

## 2021-05-06 DIAGNOSIS — Z23 ENCOUNTER FOR VACCINATION: Primary | ICD-10-CM

## 2021-05-06 PROCEDURE — 0002A PFIZER SARS-COV-2 VACCINE: CPT | Performed by: INTERNAL MEDICINE

## 2021-05-06 PROCEDURE — 91300 PFIZER SARS-COV-2 VACCINE: CPT | Performed by: INTERNAL MEDICINE

## 2021-10-20 DIAGNOSIS — E78.2 MIXED HYPERLIPIDEMIA: ICD-10-CM

## 2021-10-21 RX ORDER — SIMVASTATIN 80 MG
TABLET ORAL
Qty: 90 TABLET | Refills: 0 | Status: SHIPPED | OUTPATIENT
Start: 2021-10-21 | End: 2022-04-28

## 2022-02-22 ENCOUNTER — TELEPHONE (OUTPATIENT)
Dept: MEDICAL GROUP | Facility: PHYSICIAN GROUP | Age: 87
End: 2022-02-22
Payer: MEDICARE

## 2022-02-22 DIAGNOSIS — Z12.5 SCREENING FOR PROSTATE CANCER: ICD-10-CM

## 2022-02-22 DIAGNOSIS — R97.20 ELEVATED PSA MEASUREMENT: ICD-10-CM

## 2022-02-22 DIAGNOSIS — E78.2 MIXED HYPERLIPIDEMIA: ICD-10-CM

## 2022-02-22 NOTE — TELEPHONE ENCOUNTER
VOIC[employer]AIL  1. Caller Name: Bo Howell                      Call Back Number: [phone]    2. Message: patients niece called requesting yearly labs before patients upcoming appointment on 3/1    Please advise

## 2022-02-23 NOTE — TELEPHONE ENCOUNTER
Patient's family member know that labs are ordered for patient to complete prior to his appointment.

## 2022-03-28 ENCOUNTER — HOSPITAL ENCOUNTER (OUTPATIENT)
Dept: LAB | Facility: MEDICAL CENTER | Age: 87
End: 2022-03-28
Attending: NURSE PRACTITIONER
Payer: MEDICARE

## 2022-03-28 DIAGNOSIS — R97.20 ELEVATED PSA MEASUREMENT: ICD-10-CM

## 2022-03-28 DIAGNOSIS — Z12.5 SCREENING FOR PROSTATE CANCER: ICD-10-CM

## 2022-03-28 DIAGNOSIS — E78.2 MIXED HYPERLIPIDEMIA: ICD-10-CM

## 2022-03-28 LAB
ALBUMIN SERPL BCP-MCNC: 4.4 G/DL (ref 3.2–4.9)
ALBUMIN/GLOB SERPL: 1.8 G/DL
ALP SERPL-CCNC: 91 U/L (ref 30–99)
ALT SERPL-CCNC: 13 U/L (ref 2–50)
ANION GAP SERPL CALC-SCNC: 12 MMOL/L (ref 7–16)
AST SERPL-CCNC: 20 U/L (ref 12–45)
BILIRUB SERPL-MCNC: 0.6 MG/DL (ref 0.1–1.5)
BUN SERPL-MCNC: 17 MG/DL (ref 8–22)
CALCIUM SERPL-MCNC: 9 MG/DL (ref 8.5–10.5)
CHLORIDE SERPL-SCNC: 105 MMOL/L (ref 96–112)
CHOLEST SERPL-MCNC: 165 MG/DL (ref 100–199)
CO2 SERPL-SCNC: 24 MMOL/L (ref 20–33)
CREAT SERPL-MCNC: 0.76 MG/DL (ref 0.5–1.4)
FASTING STATUS PATIENT QL REPORTED: NORMAL
GFR SERPLBLD CREATININE-BSD FMLA CKD-EPI: 87 ML/MIN/1.73 M 2
GLOBULIN SER CALC-MCNC: 2.5 G/DL (ref 1.9–3.5)
GLUCOSE SERPL-MCNC: 82 MG/DL (ref 65–99)
HDLC SERPL-MCNC: 52 MG/DL
LDLC SERPL CALC-MCNC: 100 MG/DL
POTASSIUM SERPL-SCNC: 4.6 MMOL/L (ref 3.6–5.5)
PROT SERPL-MCNC: 6.9 G/DL (ref 6–8.2)
PSA SERPL-MCNC: 26.9 NG/ML (ref 0–4)
SODIUM SERPL-SCNC: 141 MMOL/L (ref 135–145)
TRIGL SERPL-MCNC: 67 MG/DL (ref 0–149)

## 2022-03-28 PROCEDURE — 80061 LIPID PANEL: CPT

## 2022-03-28 PROCEDURE — 84153 ASSAY OF PSA TOTAL: CPT | Mod: GA

## 2022-03-28 PROCEDURE — 80053 COMPREHEN METABOLIC PANEL: CPT

## 2022-03-28 PROCEDURE — 36415 COLL VENOUS BLD VENIPUNCTURE: CPT | Mod: GA

## 2022-03-29 ENCOUNTER — OFFICE VISIT (OUTPATIENT)
Dept: MEDICAL GROUP | Facility: PHYSICIAN GROUP | Age: 87
End: 2022-03-29
Payer: MEDICARE

## 2022-03-29 VITALS
WEIGHT: 166.4 LBS | RESPIRATION RATE: 16 BRPM | HEIGHT: 72 IN | OXYGEN SATURATION: 100 % | BODY MASS INDEX: 22.54 KG/M2 | SYSTOLIC BLOOD PRESSURE: 120 MMHG | TEMPERATURE: 97.8 F | HEART RATE: 64 BPM | DIASTOLIC BLOOD PRESSURE: 48 MMHG

## 2022-03-29 DIAGNOSIS — R97.20 ELEVATED PSA MEASUREMENT: ICD-10-CM

## 2022-03-29 DIAGNOSIS — E78.2 MIXED HYPERLIPIDEMIA: ICD-10-CM

## 2022-03-29 DIAGNOSIS — Z00.00 MEDICARE ANNUAL WELLNESS VISIT, SUBSEQUENT: Primary | ICD-10-CM

## 2022-03-29 PROCEDURE — G0439 PPPS, SUBSEQ VISIT: HCPCS | Performed by: NURSE PRACTITIONER

## 2022-03-29 ASSESSMENT — PATIENT HEALTH QUESTIONNAIRE - PHQ9: CLINICAL INTERPRETATION OF PHQ2 SCORE: 0

## 2022-03-29 ASSESSMENT — ENCOUNTER SYMPTOMS: GENERAL WELL-BEING: GOOD

## 2022-03-29 ASSESSMENT — ACTIVITIES OF DAILY LIVING (ADL): BATHING_REQUIRES_ASSISTANCE: 0

## 2022-03-29 NOTE — PROGRESS NOTES
Chief Complaint   Patient presents with   • Annual Wellness Visit       HPI:  Moshe is a 86 y.o. here for Medicare Annual Wellness Visit    Problem   Mixed Hyperlipidemia    Chronic in nature.  Well controlled.  Ricky continues to take 80 mg of simvastatin p.o. nightly without side effects.  Last labs indicate excellent numbers.     Elevated Psa Measurement    Chronic in nature increase in PSA.  Patient has followed with urology in the past and is currently monitoring this with yearly PSAs.      Risk for Falls (Resolved)         Patient Active Problem List    Diagnosis Date Noted   • Mixed hyperlipidemia 01/08/2019   • Impacted cerumen of left ear 01/08/2019   • Elevated PSA measurement 01/08/2019       Current Outpatient Medications   Medication Sig Dispense Refill   • simvastatin (ZOCOR) 80 MG tablet TAKE  ONE TABLET BY MOUTH NIGHTLY AT BEDTIME 90 Tablet 0   • omeprazole (PRILOSEC) 20 MG delayed-release capsule Take 20 mg by mouth every day.     • aspirin (ASA) 81 MG Chew Tab chewable tablet Take 81 mg by mouth every day.     • SUPER B COMPLEX/C PO Take  by mouth.     • Multiple Vitamins-Minerals (CENTRUM ADULTS PO) Take  by mouth.     • Cholecalciferol (D3-50 PO) Take  by mouth.       No current facility-administered medications for this visit.        Patient is taking medications as noted in medication list.  Current supplements as per medication list.     Allergies: Patient has no known allergies.    Current social contact/activities: Keeping in touch with friends and family, by phone and in person visits     Is patient current with immunizations? No, due for FLU. Patient is interested in receiving NONE today.    He  reports that he quit smoking about 19 years ago. His smoking use included cigarettes. He has never used smokeless tobacco. He reports current alcohol use. He reports that he does not use drugs.  Counseling given: Yes      DPA/Advanced directive: Patient has Advanced Directive, but it is not on file.  Instructed to bring in a copy to scan into their chart.    ROS:    Gait: Uses a cane   Ostomy: No   Other tubes: No   Amputations: No   Chronic oxygen use No   Last eye exam 2014   Wears hearing aids: No   : Denies any urinary leakage during the last 6 months      Screening:    Depression Screening  Little interest or pleasure in doing things?  0 - not at all  Feeling down, depressed, or hopeless? 0 - not at all  Trouble falling or staying asleep, or sleeping too much?     Feeling tired or having little energy?     Poor appetite or overeating?     Feeling bad about yourself - or that you are a failure or have let yourself or your family down?    Trouble concentrating on things, such as reading the newspaper or watching television?    Moving or speaking so slowly that other people could have noticed.  Or the opposite - being so fidgety or restless that you have been moving around a lot more than usual?     Thoughts that you would be better off dead, or of hurting yourself?     Patient Health Questionnaire Score:      If depressive symptoms identified deferred to follow up visit unless specifically addressed in assessment and plan.    Interpretation of PHQ-9 Total Score   Score Severity   1-4 No Depression   5-9 Mild Depression   10-14 Moderate Depression   15-19 Moderately Severe Depression   20-27 Severe Depression      Screening for Cognitive Impairment  Three Minute Recall (daughter, heaven, sheyla)  2/3    Draw clock face with all 12 numbers and set the hands to show 10 past 11.  Yes 0/5, Pt sight impaired   If cognitive concerns identified, deferred for follow up unless specifically addressed in assessment and plan.    Fall Risk Assessment  Has the patient had two or more falls in the last year or any fall with injury in the last year?  Yes  If fall risk identified, deferred for follow up unless specifically addressed in assessment and plan.    Safety Assessment  Throw rugs on floor.  No  Handrails on all  stairs.  Yes  Good lighting in all hallways.  Yes  Difficulty hearing.  No  Patient counseled about all safety risks that were identified.    Functional Assessment ADLs  Are there any barriers preventing you from cooking for yourself or meeting nutritional needs?  No.    Are there any barriers preventing you from driving safely or obtaining transportation?  No.    Are there any barriers preventing you from using a telephone or calling for help?  No.    Are there any barriers preventing you from shopping?  No.    Are there any barriers preventing you from taking care of your own finances?  No.    Are there any barriers preventing you from managing your medications?    No.    Are there any barriers preventing you from showering, bathing or dressing yourself?  No.    Are you currently engaging in any exercise or physical activity?  No.     What is your perception of your health?  Good.    Health Maintenance Summary          Overdue - IMM DTaP/Tdap/Td Vaccine (1 - Tdap) Overdue - never done    No completion history exists for this topic.          Overdue - IMM ZOSTER VACCINES (1 of 2) Overdue - never done    No completion history exists for this topic.          Overdue - IMM PNEUMOCOCCAL VACCINE: 65+ Years (1 - PCV) Overdue - never done    No completion history exists for this topic.          Overdue - IMM INFLUENZA (1) Overdue - never done    No completion history exists for this topic.          Annual Wellness Visit (Every 366 Days) Next due on 3/30/2023    03/29/2022  Visit Dx: Medicare annual wellness visit, subsequent    03/29/2022  Level of Service: ANNUAL WELLNESS VISIT-INCLUDES PPPS SUBSEQUE*    02/22/2021  Visit Dx: Medicare annual wellness visit, subsequent    02/22/2021  Level of Service: [place] ANNUAL WELLNESS VISIT-INCLUDES PPPS SUBSEQUE*    02/20/2020  Level of Service: [place] ANNUAL WELLNESS VISIT-INCLUDES PPPS SUBSEQUE*    Only the first 5 history entries have been loaded, but more history exists.           COLORECTAL CANCER SCREENING (COLONOSCOPY - Every 10 Years) Tentatively due on 2013  REFERRAL TO GI FOR COLONOSCOPY          COVID-19 Vaccine (Series Information) Completed    2021  Imm Admin: Pfizer SARS-CoV-2 Vaccine 12+    2021  Imm Admin: Pfizer SARS-CoV-2 Vaccine    2021  Imm Admin: Pfizer SARS-CoV-2 Vaccine          IMM HEP B VACCINE (Series Information) Aged Out    No completion history exists for this topic.          IMM MENINGOCOCCAL VACCINE (MCV4) (Series Information) Aged Out    No completion history exists for this topic.                Patient Care Team:  COLEEN Keller as PCP - General (Family Medicine)    Social History     Tobacco Use   • Smoking status: Former Smoker     Types: Cigarettes     Quit date: 2003     Years since quittin.2   • Smokeless tobacco: Never Used   Vaping Use   • Vaping Use: Never used   Substance Use Topics   • Alcohol use: Yes     Comment: Occ    • Drug use: No     History reviewed. No pertinent family history.  He  has a past medical history of Hyperlipidemia.   Past Surgical History:   Procedure Laterality Date   • ARTHROPLASTY     • HERNIA REPAIR      inguinal x3   • HIP REPLACEMENT, TOTAL Left          Exam:   /48 (BP Location: Left arm, Patient Position: Sitting, BP Cuff Size: Adult)   Pulse 64   Temp 36.6 °C (97.8 °F) (Temporal)   Resp 16   Ht 1.829 m (6')   Wt 75.5 kg (166 lb 6.4 oz)   SpO2 100%  Body mass index is 22.57 kg/m².    Hearing good.    Dentition fair  Alert, oriented in no acute distress  Eye contact is good, speech goal directed, affect calm      Assessment and Plan. The following treatment and monitoring plan is recommended:      Problem List Items Addressed This Visit     Elevated PSA measurement     -continue yearly PSA         Mixed hyperlipidemia     -Continue simvastatin 80 mg p.o. daily.           Other Visit Diagnoses     Medicare annual wellness visit, subsequent    -   Primary             Services suggested: No services needed at this time  Health Care Screening recommendations as per orders if indicated.  Referrals offered: PT/OT/Nutrition counseling/Behavioral Health/Smoking cessation as per orders if indicated.    Discussion today about general wellness and lifestyle habits:    · Prevent falls and reduce trip hazards; Cautioned about securing or removing rugs.  · Have a working fire alarm and carbon monoxide detector;   · Engage in regular physical activity and social activities.     Follow-up: Return in about 6 months (around 9/29/2022), or if symptoms worsen or fail to improve, for Medication Review.

## 2022-03-31 PROBLEM — Z91.81 RISK FOR FALLS: Status: RESOLVED | Noted: 2021-02-22 | Resolved: 2022-03-31

## 2022-04-28 DIAGNOSIS — E78.2 MIXED HYPERLIPIDEMIA: ICD-10-CM

## 2022-04-28 RX ORDER — SIMVASTATIN 80 MG
TABLET ORAL
Qty: 100 TABLET | Refills: 3 | Status: SHIPPED | OUTPATIENT
Start: 2022-04-28 | End: 2023-01-01

## 2022-09-01 ENCOUNTER — OFFICE VISIT (OUTPATIENT)
Dept: MEDICAL GROUP | Facility: PHYSICIAN GROUP | Age: 87
End: 2022-09-01
Payer: MEDICARE

## 2022-09-01 VITALS
BODY MASS INDEX: 20.32 KG/M2 | HEIGHT: 72 IN | TEMPERATURE: 99.1 F | SYSTOLIC BLOOD PRESSURE: 110 MMHG | HEART RATE: 83 BPM | WEIGHT: 150 LBS | DIASTOLIC BLOOD PRESSURE: 56 MMHG | OXYGEN SATURATION: 96 %

## 2022-09-01 DIAGNOSIS — M25.561 CHRONIC PAIN OF RIGHT KNEE: ICD-10-CM

## 2022-09-01 DIAGNOSIS — M25.461 EFFUSION, RIGHT KNEE: ICD-10-CM

## 2022-09-01 DIAGNOSIS — H61.23 BILATERAL IMPACTED CERUMEN: ICD-10-CM

## 2022-09-01 DIAGNOSIS — G89.29 CHRONIC PAIN OF RIGHT KNEE: ICD-10-CM

## 2022-09-01 PROCEDURE — 69210 REMOVE IMPACTED EAR WAX UNI: CPT | Performed by: NURSE PRACTITIONER

## 2022-09-01 PROCEDURE — 99213 OFFICE O/P EST LOW 20 MIN: CPT | Mod: 25 | Performed by: NURSE PRACTITIONER

## 2022-09-02 PROBLEM — H61.23 BILATERAL IMPACTED CERUMEN: Status: ACTIVE | Noted: 2019-01-08

## 2022-09-02 ASSESSMENT — ENCOUNTER SYMPTOMS
PALPITATIONS: 0
COUGH: 0
HEADACHES: 0
DIZZINESS: 0
DEPRESSION: 0
FEVER: 0
FALLS: 0
CHILLS: 0
SHORTNESS OF BREATH: 0

## 2022-09-02 NOTE — PROGRESS NOTES
Subjective:     Chief Complaint   Patient presents with    Knee Pain     Right knee; warm to the touch       HPI:   Moshe presents today with     Problem   Chronic Pain of Right Knee    Chronic in nature. Fell down the stairs at work multiple years ago. States previously he would be able to do exercises which would improve the pain. States right hip would improve. States saw chiropractor leg length discrepancy, did an adjustment just over 2 years ago. States that hip pain was better and now the knee is more painful significantly worse over the last few months. Previously the tylenol would help. States taking 3000 mg tylenol daily. States feels warm to the touch over the last 2 years.      Effusion, Right Knee   Bilateral Impacted Cerumen    Bilateral cerumen impaction is noted today.         Current Outpatient Medications Ordered in Epic   Medication Sig Dispense Refill    simvastatin (ZOCOR) 80 MG tablet TAKE  ONE TABLET BY MOUTH NIGHTLY AT BEDTIME 100 Tablet 3    omeprazole (PRILOSEC) 20 MG delayed-release capsule Take 20 mg by mouth every day.      aspirin (ASA) 81 MG Chew Tab chewable tablet Take 81 mg by mouth every day.      SUPER B COMPLEX/C PO Take  by mouth.      Multiple Vitamins-Minerals (CENTRUM ADULTS PO) Take  by mouth.      Cholecalciferol (D3-50 PO) Take  by mouth.       No current Epic-ordered facility-administered medications on file.       Health Maintenance: declines vaccines today    Review of Systems   Constitutional:  Negative for chills, fever and malaise/fatigue.   Respiratory:  Negative for cough and shortness of breath.    Cardiovascular:  Negative for chest pain, palpitations and leg swelling.   Musculoskeletal:  Positive for joint pain. Negative for falls.   Neurological:  Negative for dizziness and headaches.   Psychiatric/Behavioral:  Negative for depression and suicidal ideas.        Objective:     Exam:  /56 (BP Location: Left arm, Patient Position: Sitting, BP Cuff Size: Adult)    Pulse 83   Temp 37.3 °C (99.1 °F) (Temporal)   Ht 1.829 m (6')   Wt 68 kg (150 lb)   SpO2 96%   BMI 20.34 kg/m²  Body mass index is 20.34 kg/m².    Physical Exam  Constitutional:       Appearance: Normal appearance.   HENT:      Head: Normocephalic and atraumatic.   Cardiovascular:      Rate and Rhythm: Normal rate and regular rhythm.      Pulses: Normal pulses.      Heart sounds: Normal heart sounds.   Pulmonary:      Effort: Pulmonary effort is normal.      Breath sounds: Normal breath sounds.   Musculoskeletal:      Right knee: Swelling and effusion present. No deformity, erythema, ecchymosis, lacerations or crepitus. Normal range of motion. Tenderness present. Normal alignment. Normal pulse.      Instability Tests: Anterior drawer test negative. Posterior drawer test negative.        Legs:       Comments: Pain most severe at circled location.   Skin:     General: Skin is warm and dry.   Neurological:      General: No focal deficit present.      Mental Status: He is alert and oriented to person, place, and time.     Assessment & Plan:     86 y.o. male with the following -     Problem List Items Addressed This Visit       Bilateral impacted cerumen    Chronic pain of right knee     - Noted effusion, no instability noted on assessment no evidence of infection  -Recommended over-the-counter medications for inflammation including topical Aspercreme, Arnica gel, or Voltaren  -We discussed narcotic pain medications but did discuss extensively the risk of fatigue, nausea vomiting with these medications as patient has had multiple issues with medications causing vomiting.  On extensive discussion of risks, side effects plan is to try over-the-counter topical medications and follow-up with orthopedics for further evaluation.  We did discuss that [facility] urgent care is available if they prefer to go that route.  Patient and family did not want to do imaging through our office as they were concerned  about having to repeat the imaging once they were seen by an orthopedic specialist         Relevant Orders    Referral to Orthopedics    Effusion, right knee    Relevant Orders    Referral to Orthopedics     Procedure: Cerumen Removal  Risks and benefits of procedure discussed with patient.  Cerumen removed with  lavage   Patient tolerated the procedure well  Pt educated about proper care of ear canal. Q-tip cleaning discouraged, use of debrox and warm water lavage discussed.  Post lavage curette performed by provider, Post procedure exam with clear canal and normal TM.        Return in about 6 months (around 3/1/2023) for AWV.    I have placed the below orders and discussed them with an approved delegating provider. The MA is performing the below orders under the direction of Dr. Cagle.     Please note that this dictation was created using voice recognition software. I have made every reasonable attempt to correct obvious errors, but I expect that there are errors of grammar and possibly content that I did not discover before finalizing the note.

## 2022-09-02 NOTE — ASSESSMENT & PLAN NOTE
- Noted effusion, no instability noted on assessment no evidence of infection  -Recommended over-the-counter medications for inflammation including topical Aspercreme, Arnica gel, or Voltaren  -We discussed narcotic pain medications but did discuss extensively the risk of fatigue, nausea vomiting with these medications as patient has had multiple issues with medications causing vomiting.  On extensive discussion of risks, side effects plan is to try over-the-counter topical medications and follow-up with orthopedics for further evaluation.  We did discuss that [facility] urgent care is available if they prefer to go that route.  Patient and family did not want to do imaging through our office as they were concerned about having to repeat the imaging once they were seen by an orthopedic specialist

## 2022-09-26 PROBLEM — M16.9 OSTEOARTHRITIS OF HIP: Status: ACTIVE | Noted: 2022-09-26

## 2022-10-05 ENCOUNTER — OFFICE VISIT (OUTPATIENT)
Dept: MEDICAL GROUP | Facility: PHYSICIAN GROUP | Age: 87
End: 2022-10-05
Payer: MEDICARE

## 2022-10-05 VITALS
HEART RATE: 67 BPM | WEIGHT: 139 LBS | SYSTOLIC BLOOD PRESSURE: 132 MMHG | TEMPERATURE: 97.5 F | DIASTOLIC BLOOD PRESSURE: 52 MMHG | BODY MASS INDEX: 18.42 KG/M2 | OXYGEN SATURATION: 96 % | HEIGHT: 73 IN

## 2022-10-05 DIAGNOSIS — M16.11 PRIMARY OSTEOARTHRITIS OF RIGHT HIP: Primary | ICD-10-CM

## 2022-10-05 DIAGNOSIS — Z01.818 PREOPERATIVE EXAMINATION: ICD-10-CM

## 2022-10-05 PROCEDURE — 99213 OFFICE O/P EST LOW 20 MIN: CPT | Performed by: FAMILY MEDICINE

## 2022-10-05 NOTE — PROGRESS NOTES
"REASON FOR VISIT: Pre-Op Consultation  Consultation Requested by: Dr. Deshawn Del Valle  Procedure date and type: Right ТАТЬЯНА  Inherent cardiac risk of procedure: intermediate    History of condition for which surgery is planned: Chronic pain in the right hip for \"forever\". Recently got an x-ray showing bone on bone and now plans to get he right hip replaced.     Current chronic conditions: does not have any pertinent problems on file.    Past medical history:  has a past medical history of Hyperlipidemia.. Negative for: CAD, SBE, CVA, TIA, DVT, PE, bleeding requiring transfusion, intubation.    Surgical and anesthetic history:  has a past surgical history that includes arthroplasty; hip replacement, total (Left); and hernia repair. Prior surgery without complication, bleeding, reaction to anesthetic, prolonged recovery    Habits:   Social History     Tobacco Use    Smoking status: Former     Types: Cigarettes     Quit date: 2003     Years since quittin.7    Smokeless tobacco: Never   Vaping Use    Vaping Use: Never used   Substance Use Topics    Alcohol use: Yes     Comment: Occ     Drug use: No       Allergies: Patient has no known allergies. No known allergy to Anesthetic, or Latex.       Current medicines:   Current Outpatient Medications   Medication Sig Dispense Refill    simvastatin (ZOCOR) 80 MG tablet TAKE  ONE TABLET BY MOUTH NIGHTLY AT BEDTIME 100 Tablet 3    omeprazole (PRILOSEC) 20 MG delayed-release capsule Take 20 mg by mouth every day.      aspirin (ASA) 81 MG Chew Tab chewable tablet Take 81 mg by mouth every day.      SUPER B COMPLEX/C PO Take  by mouth.      Multiple Vitamins-Minerals (CENTRUM ADULTS PO) Take  by mouth.      Cholecalciferol (D3-50 PO) Take  by mouth.       No current facility-administered medications for this visit.       Anticoagulant: +ASA, no NSAIDs   Herbals: Denies - Black Cohash, Echinacea, Garlic, Kim, Ginkgo Biloba, Ginseng, Kava, Eugene’s Wort,  Saw [place], Valerian " "              Duke Activity Status Index: 23.45  METS: 5.62 - moderate functional capacity  ASA Class: 2    ROS: negative for: CP, SOB, RIVERA, Orthopnea, wheezing, leg edema, polydipsia, polyuria, fevers, chills, sweats, cough, cold, congestion, abd pain, reflux, black or bloody stools, weight gain.  +weight loss - lost 11 lbs since 9/1/22    PHYSICAL EXAMINATION:  VITAL SIGNS: /52 (BP Location: Right arm, Patient Position: Sitting, BP Cuff Size: Adult)   Pulse 67   Temp 36.4 °C (97.5 °F) (Temporal)   Ht 1.854 m (6' 1\")   Wt 63 kg (139 lb)   SpO2 96%  Body mass index is 18.34 kg/m².  HEENT: EOMI, PERRL. Oropharynx pink, moist. Normal airway. Neck supple, no cervical lymphadenopathy.  LUNGS: CTAB good excursion.   HEART: RRR no murmur.  ABDOMEN: soft, nondistended, nontender normal BS. No HSM.  LOWER EXTREMITIES: warm and well perfused with no edema.    Labs: per surgeon    Risk of complications using [employer] Surgical Risk Calculator:  Below average risk:  -Surgical site infection, UTI, renal failure, return to the OR    Average risk:  - Any complication, VTE, new mobility aid use    Above average risk:  - Serious complication, pneumonia, cardiac complication, readmission, death, discharged to nursing or rehab facility, sepsis, postop delirium, functional decline, new/worsening pressure ulcer    IMPRESSION:  Diagnoses of Primary osteoarthritis of right hip and Preoperative examination were pertinent to this visit.  Planned surgery: Left ТАТЬЯНА   High risk medical conditions: negative for Cardiac, Pulmonary, Bleeding, Poor healing, Thrombosis, Debility    PLAN:  Chronic medical conditions: Stable and controlled. Continue current medicines.   Avoid drugs that potentiate bleeding as advised by surgeon  Discontinue all herbal supplements 2 weeks prior to surgery.  Need for SBE prophylaxis: no  This patient is considered Very Low risk for cardiopulmonary complications for this planned surgery by " the Andrew index    Andrew Index for assessing perioperative cardiovascular risk [Circulation 1999;100:1047]:   (one point each for * high-risk surgery [ intrathoracic, suprainguinal vascular, intraperitoneal ],* Hx ischemic heart dz, * Hx CHF, *Hx TIA or CVA, *IDDM, *Serum Cr >2.0)   Interpretation of risk: (Complication = MI, Pulm edema, v-fib or cardiac arrest, complete heart block)  Very Low: 0 points = 0.4 % complication. Low: 1 point = 0.9 %, Moderate: 2 points = 6.6 %, High: 3+ points = 11%.

## 2022-10-18 ENCOUNTER — PRE-ADMISSION TESTING (OUTPATIENT)
Dept: ADMISSIONS | Facility: MEDICAL CENTER | Age: 87
End: 2022-10-18
Attending: ORTHOPAEDIC SURGERY
Payer: MEDICARE

## 2022-10-18 DIAGNOSIS — Z01.810 PRE-OPERATIVE CARDIOVASCULAR EXAMINATION: ICD-10-CM

## 2022-10-18 DIAGNOSIS — Z01.818 PREOPERATIVE EVALUATION TO RULE OUT SURGICAL CONTRAINDICATION: ICD-10-CM

## 2022-10-18 DIAGNOSIS — Z01.812 PRE-OPERATIVE LABORATORY EXAMINATION: ICD-10-CM

## 2022-10-18 LAB
ANION GAP SERPL CALC-SCNC: 14 MMOL/L (ref 7–16)
BUN SERPL-MCNC: 31 MG/DL (ref 8–22)
CALCIUM SERPL-MCNC: 9.4 MG/DL (ref 8.4–10.2)
CHLORIDE SERPL-SCNC: 102 MMOL/L (ref 96–112)
CO2 SERPL-SCNC: 23 MMOL/L (ref 20–33)
CREAT SERPL-MCNC: 0.64 MG/DL (ref 0.5–1.4)
EKG IMPRESSION: NORMAL
ERYTHROCYTE [DISTWIDTH] IN BLOOD BY AUTOMATED COUNT: 62.9 FL (ref 35.9–50)
GFR SERPLBLD CREATININE-BSD FMLA CKD-EPI: 92 ML/MIN/1.73 M 2
GLUCOSE SERPL-MCNC: 97 MG/DL (ref 65–99)
HCT VFR BLD AUTO: 41.2 % (ref 42–52)
HGB BLD-MCNC: 13 G/DL (ref 14–18)
MCH RBC QN AUTO: 27.8 PG (ref 27–33)
MCHC RBC AUTO-ENTMCNC: 31.6 G/DL (ref 33.7–35.3)
MCV RBC AUTO: 88 FL (ref 81.4–97.8)
PLATELET # BLD AUTO: 190 K/UL (ref 164–446)
PMV BLD AUTO: 9.4 FL (ref 9–12.9)
POTASSIUM SERPL-SCNC: 4.2 MMOL/L (ref 3.6–5.5)
RBC # BLD AUTO: 4.68 M/UL (ref 4.7–6.1)
SCCMEC + MECA PNL NOSE NAA+PROBE: NEGATIVE
SCCMEC + MECA PNL NOSE NAA+PROBE: NEGATIVE
SODIUM SERPL-SCNC: 139 MMOL/L (ref 135–145)
WBC # BLD AUTO: 6 K/UL (ref 4.8–10.8)

## 2022-10-18 PROCEDURE — 36415 COLL VENOUS BLD VENIPUNCTURE: CPT

## 2022-10-18 PROCEDURE — 93010 ELECTROCARDIOGRAM REPORT: CPT | Performed by: INTERNAL MEDICINE

## 2022-10-18 PROCEDURE — 87640 STAPH A DNA AMP PROBE: CPT | Mod: XU

## 2022-10-18 PROCEDURE — 87641 MR-STAPH DNA AMP PROBE: CPT

## 2022-10-18 PROCEDURE — 80048 BASIC METABOLIC PNL TOTAL CA: CPT

## 2022-10-18 PROCEDURE — 93005 ELECTROCARDIOGRAM TRACING: CPT

## 2022-10-18 PROCEDURE — 85027 COMPLETE CBC AUTOMATED: CPT

## 2022-10-18 RX ORDER — SENNOSIDES 8.6 MG
650 CAPSULE ORAL EVERY 6 HOURS PRN
Status: ON HOLD | COMMUNITY
End: 2022-11-01

## 2022-10-18 RX ORDER — ACETAMINOPHEN 500 MG
500-1000 TABLET ORAL EVERY 6 HOURS PRN
COMMUNITY
End: 2022-10-18

## 2022-10-19 NOTE — DISCHARGE PLANNING
DISCHARGE PLANNING NOTE - TOTAL JOINT    Procedure: Procedure(s):  ARTHROPLASTY, HIP, TOTAL, ANTERIOR APPROACH  Procedure Date: 11/1/2022  Insurance: Payor: MEDICARE / Plan: MEDICARE PART A & B / Product Type: *No Product type* /    Equipment currently available at home?  cane and front-wheel walker  Steps into the home? 1  Steps within the home? 1  Toilet height? Standard  Type of shower? walk-in shower  Who will be with you during your recovery? other: Lina  Is Outpatient Physical Therapy set up after surgery? No   Did you take the Total Joint Class and where? Yes  Planning same day discharge?Yes     This writer met with pt and Lina during his preadmission appt. Pt educated to preop showers and nasal mrsa screen. Pt requesting home health post op. Home safety checklist reviewed and copy given to pt. Pt educated to dc criteria. All questions answered and verbalizes understanding of all instructions. No dc needs identified at this time. Anticipate dc to home without barriers.

## 2022-11-01 ENCOUNTER — ANESTHESIA (OUTPATIENT)
Dept: SURGERY | Facility: MEDICAL CENTER | Age: 87
End: 2022-11-01
Payer: MEDICARE

## 2022-11-01 ENCOUNTER — APPOINTMENT (OUTPATIENT)
Dept: RADIOLOGY | Facility: MEDICAL CENTER | Age: 87
End: 2022-11-01
Attending: STUDENT IN AN ORGANIZED HEALTH CARE EDUCATION/TRAINING PROGRAM
Payer: MEDICARE

## 2022-11-01 ENCOUNTER — ANESTHESIA EVENT (OUTPATIENT)
Dept: SURGERY | Facility: MEDICAL CENTER | Age: 87
End: 2022-11-01
Payer: MEDICARE

## 2022-11-01 ENCOUNTER — HOSPITAL ENCOUNTER (OUTPATIENT)
Facility: MEDICAL CENTER | Age: 87
End: 2022-11-02
Attending: ORTHOPAEDIC SURGERY | Admitting: ORTHOPAEDIC SURGERY
Payer: MEDICARE

## 2022-11-01 ENCOUNTER — APPOINTMENT (OUTPATIENT)
Dept: RADIOLOGY | Facility: MEDICAL CENTER | Age: 87
End: 2022-11-01
Attending: ORTHOPAEDIC SURGERY
Payer: MEDICARE

## 2022-11-01 DIAGNOSIS — Z96.641 STATUS POST TOTAL HIP REPLACEMENT, RIGHT: ICD-10-CM

## 2022-11-01 DIAGNOSIS — M16.11 PRIMARY OSTEOARTHRITIS OF RIGHT HIP: ICD-10-CM

## 2022-11-01 DIAGNOSIS — Z01.812 PRE-OPERATIVE LABORATORY EXAMINATION: ICD-10-CM

## 2022-11-01 LAB
ABO + RH BLD: NORMAL
ABO GROUP BLD: NORMAL
BLD GP AB SCN SERPL QL: NORMAL
RH BLD: NORMAL

## 2022-11-01 PROCEDURE — 160009 HCHG ANES TIME/MIN: Performed by: ORTHOPAEDIC SURGERY

## 2022-11-01 PROCEDURE — 96376 TX/PRO/DX INJ SAME DRUG ADON: CPT | Mod: XU

## 2022-11-01 PROCEDURE — 700105 HCHG RX REV CODE 258: Performed by: STUDENT IN AN ORGANIZED HEALTH CARE EDUCATION/TRAINING PROGRAM

## 2022-11-01 PROCEDURE — 72170 X-RAY EXAM OF PELVIS: CPT

## 2022-11-01 PROCEDURE — 27130 TOTAL HIP ARTHROPLASTY: CPT | Mod: RT | Performed by: ORTHOPAEDIC SURGERY

## 2022-11-01 PROCEDURE — 86901 BLOOD TYPING SEROLOGIC RH(D): CPT

## 2022-11-01 PROCEDURE — 160048 HCHG OR STATISTICAL LEVEL 1-5: Performed by: ORTHOPAEDIC SURGERY

## 2022-11-01 PROCEDURE — 700101 HCHG RX REV CODE 250: Performed by: ORTHOPAEDIC SURGERY

## 2022-11-01 PROCEDURE — 700111 HCHG RX REV CODE 636 W/ 250 OVERRIDE (IP): Performed by: ORTHOPAEDIC SURGERY

## 2022-11-01 PROCEDURE — 700102 HCHG RX REV CODE 250 W/ 637 OVERRIDE(OP): Performed by: STUDENT IN AN ORGANIZED HEALTH CARE EDUCATION/TRAINING PROGRAM

## 2022-11-01 PROCEDURE — 160035 HCHG PACU - 1ST 60 MINS PHASE I: Performed by: ORTHOPAEDIC SURGERY

## 2022-11-01 PROCEDURE — 36415 COLL VENOUS BLD VENIPUNCTURE: CPT

## 2022-11-01 PROCEDURE — 86900 BLOOD TYPING SEROLOGIC ABO: CPT

## 2022-11-01 PROCEDURE — 700111 HCHG RX REV CODE 636 W/ 250 OVERRIDE (IP): Performed by: ANESTHESIOLOGY

## 2022-11-01 PROCEDURE — 01214 ANES OPEN PX TOT HIP ARTHRP: CPT | Performed by: ANESTHESIOLOGY

## 2022-11-01 PROCEDURE — 96375 TX/PRO/DX INJ NEW DRUG ADDON: CPT | Mod: XU

## 2022-11-01 PROCEDURE — C1776 JOINT DEVICE (IMPLANTABLE): HCPCS | Performed by: ORTHOPAEDIC SURGERY

## 2022-11-01 PROCEDURE — 99100 ANES PT EXTEME AGE<1 YR&>70: CPT | Performed by: ANESTHESIOLOGY

## 2022-11-01 PROCEDURE — C1713 ANCHOR/SCREW BN/BN,TIS/BN: HCPCS | Performed by: ORTHOPAEDIC SURGERY

## 2022-11-01 PROCEDURE — 160031 HCHG SURGERY MINUTES - 1ST 30 MINS LEVEL 5: Performed by: ORTHOPAEDIC SURGERY

## 2022-11-01 PROCEDURE — 94760 N-INVAS EAR/PLS OXIMETRY 1: CPT

## 2022-11-01 PROCEDURE — 700105 HCHG RX REV CODE 258: Performed by: ORTHOPAEDIC SURGERY

## 2022-11-01 PROCEDURE — 700111 HCHG RX REV CODE 636 W/ 250 OVERRIDE (IP): Performed by: STUDENT IN AN ORGANIZED HEALTH CARE EDUCATION/TRAINING PROGRAM

## 2022-11-01 PROCEDURE — 73501 X-RAY EXAM HIP UNI 1 VIEW: CPT | Mod: RT

## 2022-11-01 PROCEDURE — 73030 X-RAY EXAM OF SHOULDER: CPT | Mod: RT

## 2022-11-01 PROCEDURE — 160042 HCHG SURGERY MINUTES - EA ADDL 1 MIN LEVEL 5: Performed by: ORTHOPAEDIC SURGERY

## 2022-11-01 PROCEDURE — 86850 RBC ANTIBODY SCREEN: CPT

## 2022-11-01 PROCEDURE — 160002 HCHG RECOVERY MINUTES (STAT): Performed by: ORTHOPAEDIC SURGERY

## 2022-11-01 PROCEDURE — A9270 NON-COVERED ITEM OR SERVICE: HCPCS | Performed by: STUDENT IN AN ORGANIZED HEALTH CARE EDUCATION/TRAINING PROGRAM

## 2022-11-01 PROCEDURE — G0378 HOSPITAL OBSERVATION PER HR: HCPCS

## 2022-11-01 PROCEDURE — 27130 TOTAL HIP ARTHROPLASTY: CPT | Mod: ASROC,RT | Performed by: STUDENT IN AN ORGANIZED HEALTH CARE EDUCATION/TRAINING PROGRAM

## 2022-11-01 PROCEDURE — 96365 THER/PROPH/DIAG IV INF INIT: CPT | Mod: XU

## 2022-11-01 PROCEDURE — 700101 HCHG RX REV CODE 250: Performed by: ANESTHESIOLOGY

## 2022-11-01 PROCEDURE — 502000 HCHG MISC OR IMPLANTS RC 0278: Performed by: ORTHOPAEDIC SURGERY

## 2022-11-01 DEVICE — IMPLANT OXINIUM FEM HD 12/14 28MM +0 (1EA): Type: IMPLANTABLE DEVICE | Site: HIP | Status: FUNCTIONAL

## 2022-11-01 DEVICE — IMPLANT R3 3 HOLE ACET SHELL 60MM: Type: IMPLANTABLE DEVICE | Site: HIP | Status: FUNCTIONAL

## 2022-11-01 DEVICE — IMPLANTABLE DEVICE: Type: IMPLANTABLE DEVICE | Site: HIP | Status: FUNCTIONAL

## 2022-11-01 DEVICE — CEMENT BONE SIMPLEX W/GENTAICIN (10/PK): Type: IMPLANTABLE DEVICE | Site: HIP | Status: FUNCTIONAL

## 2022-11-01 DEVICE — IMPLANT REF SPHER HEAD SCREW 25MM (1EA): Type: IMPLANTABLE DEVICE | Site: HIP | Status: FUNCTIONAL

## 2022-11-01 RX ORDER — ONDANSETRON 2 MG/ML
INJECTION INTRAMUSCULAR; INTRAVENOUS PRN
Status: DISCONTINUED | OUTPATIENT
Start: 2022-11-01 | End: 2022-11-01 | Stop reason: SURG

## 2022-11-01 RX ORDER — DIPHENHYDRAMINE HCL 25 MG
25 TABLET ORAL EVERY 6 HOURS PRN
Status: DISCONTINUED | OUTPATIENT
Start: 2022-11-01 | End: 2022-11-02 | Stop reason: HOSPADM

## 2022-11-01 RX ORDER — IPRATROPIUM BROMIDE AND ALBUTEROL SULFATE 2.5; .5 MG/3ML; MG/3ML
3 SOLUTION RESPIRATORY (INHALATION)
Status: DISCONTINUED | OUTPATIENT
Start: 2022-11-01 | End: 2022-11-01 | Stop reason: HOSPADM

## 2022-11-01 RX ORDER — DEXAMETHASONE SODIUM PHOSPHATE 4 MG/ML
4 INJECTION, SOLUTION INTRA-ARTICULAR; INTRALESIONAL; INTRAMUSCULAR; INTRAVENOUS; SOFT TISSUE
Status: DISCONTINUED | OUTPATIENT
Start: 2022-11-01 | End: 2022-11-02 | Stop reason: HOSPADM

## 2022-11-01 RX ORDER — OXYCODONE HYDROCHLORIDE 10 MG/1
10 TABLET ORAL
Status: DISCONTINUED | OUTPATIENT
Start: 2022-11-01 | End: 2022-11-02 | Stop reason: HOSPADM

## 2022-11-01 RX ORDER — PHENYLEPHRINE HCL IN 0.9% NACL 0.5 MG/5ML
SYRINGE (ML) INTRAVENOUS PRN
Status: DISCONTINUED | OUTPATIENT
Start: 2022-11-01 | End: 2022-11-01 | Stop reason: SURG

## 2022-11-01 RX ORDER — ONDANSETRON 2 MG/ML
4 INJECTION INTRAMUSCULAR; INTRAVENOUS
Status: DISCONTINUED | OUTPATIENT
Start: 2022-11-01 | End: 2022-11-01 | Stop reason: HOSPADM

## 2022-11-01 RX ORDER — MIDAZOLAM HYDROCHLORIDE 1 MG/ML
1 INJECTION INTRAMUSCULAR; INTRAVENOUS
Status: DISCONTINUED | OUTPATIENT
Start: 2022-11-01 | End: 2022-11-01 | Stop reason: HOSPADM

## 2022-11-01 RX ORDER — IBUPROFEN 400 MG/1
800 TABLET ORAL 3 TIMES DAILY PRN
Status: DISCONTINUED | OUTPATIENT
Start: 2022-11-04 | End: 2022-11-02 | Stop reason: HOSPADM

## 2022-11-01 RX ORDER — AMOXICILLIN 250 MG
1 CAPSULE ORAL
Status: DISCONTINUED | OUTPATIENT
Start: 2022-11-01 | End: 2022-11-02 | Stop reason: HOSPADM

## 2022-11-01 RX ORDER — DEXAMETHASONE SODIUM PHOSPHATE 4 MG/ML
10 INJECTION, SOLUTION INTRA-ARTICULAR; INTRALESIONAL; INTRAMUSCULAR; INTRAVENOUS; SOFT TISSUE ONCE
Status: COMPLETED | OUTPATIENT
Start: 2022-11-02 | End: 2022-11-02

## 2022-11-01 RX ORDER — HYDROMORPHONE HYDROCHLORIDE 1 MG/ML
0.2 INJECTION, SOLUTION INTRAMUSCULAR; INTRAVENOUS; SUBCUTANEOUS
Status: DISCONTINUED | OUTPATIENT
Start: 2022-11-01 | End: 2022-11-01 | Stop reason: HOSPADM

## 2022-11-01 RX ORDER — ACETAMINOPHEN 500 MG
1000 TABLET ORAL EVERY 6 HOURS
Status: DISCONTINUED | OUTPATIENT
Start: 2022-11-01 | End: 2022-11-02 | Stop reason: HOSPADM

## 2022-11-01 RX ORDER — HALOPERIDOL 5 MG/ML
1 INJECTION INTRAMUSCULAR EVERY 6 HOURS PRN
Status: DISCONTINUED | OUTPATIENT
Start: 2022-11-01 | End: 2022-11-02 | Stop reason: HOSPADM

## 2022-11-01 RX ORDER — ROPIVACAINE HYDROCHLORIDE 5 MG/ML
INJECTION, SOLUTION EPIDURAL; INFILTRATION; PERINEURAL
Status: DISCONTINUED | OUTPATIENT
Start: 2022-11-01 | End: 2022-11-01 | Stop reason: HOSPADM

## 2022-11-01 RX ORDER — DIPHENHYDRAMINE HYDROCHLORIDE 50 MG/ML
12.5 INJECTION INTRAMUSCULAR; INTRAVENOUS
Status: DISCONTINUED | OUTPATIENT
Start: 2022-11-01 | End: 2022-11-01 | Stop reason: HOSPADM

## 2022-11-01 RX ORDER — TRAMADOL HYDROCHLORIDE 50 MG/1
50 TABLET ORAL EVERY 4 HOURS PRN
Status: DISCONTINUED | OUTPATIENT
Start: 2022-11-01 | End: 2022-11-02 | Stop reason: HOSPADM

## 2022-11-01 RX ORDER — ROCURONIUM BROMIDE 10 MG/ML
INJECTION, SOLUTION INTRAVENOUS PRN
Status: DISCONTINUED | OUTPATIENT
Start: 2022-11-01 | End: 2022-11-01 | Stop reason: SURG

## 2022-11-01 RX ORDER — SODIUM CHLORIDE 9 MG/ML
INJECTION, SOLUTION INTRAMUSCULAR; INTRAVENOUS; SUBCUTANEOUS
Status: DISCONTINUED | OUTPATIENT
Start: 2022-11-01 | End: 2022-11-01 | Stop reason: HOSPADM

## 2022-11-01 RX ORDER — HYDROMORPHONE HYDROCHLORIDE 1 MG/ML
0.4 INJECTION, SOLUTION INTRAMUSCULAR; INTRAVENOUS; SUBCUTANEOUS
Status: DISCONTINUED | OUTPATIENT
Start: 2022-11-01 | End: 2022-11-01 | Stop reason: HOSPADM

## 2022-11-01 RX ORDER — HALOPERIDOL 5 MG/ML
1 INJECTION INTRAMUSCULAR
Status: DISCONTINUED | OUTPATIENT
Start: 2022-11-01 | End: 2022-11-01 | Stop reason: HOSPADM

## 2022-11-01 RX ORDER — TRANEXAMIC ACID 100 MG/ML
INJECTION, SOLUTION INTRAVENOUS PRN
Status: DISCONTINUED | OUTPATIENT
Start: 2022-11-01 | End: 2022-11-01 | Stop reason: SURG

## 2022-11-01 RX ORDER — SODIUM CHLORIDE, SODIUM LACTATE, POTASSIUM CHLORIDE, CALCIUM CHLORIDE 600; 310; 30; 20 MG/100ML; MG/100ML; MG/100ML; MG/100ML
INJECTION, SOLUTION INTRAVENOUS CONTINUOUS
Status: ACTIVE | OUTPATIENT
Start: 2022-11-01 | End: 2022-11-01

## 2022-11-01 RX ORDER — HYDROMORPHONE HYDROCHLORIDE 1 MG/ML
0.5 INJECTION, SOLUTION INTRAMUSCULAR; INTRAVENOUS; SUBCUTANEOUS
Status: DISCONTINUED | OUTPATIENT
Start: 2022-11-01 | End: 2022-11-02 | Stop reason: HOSPADM

## 2022-11-01 RX ORDER — ENEMA 19; 7 G/133ML; G/133ML
1 ENEMA RECTAL
Status: DISCONTINUED | OUTPATIENT
Start: 2022-11-01 | End: 2022-11-02 | Stop reason: HOSPADM

## 2022-11-01 RX ORDER — DIPHENHYDRAMINE HYDROCHLORIDE 50 MG/ML
25 INJECTION INTRAMUSCULAR; INTRAVENOUS EVERY 6 HOURS PRN
Status: DISCONTINUED | OUTPATIENT
Start: 2022-11-01 | End: 2022-11-02 | Stop reason: HOSPADM

## 2022-11-01 RX ORDER — DEXAMETHASONE SODIUM PHOSPHATE 4 MG/ML
INJECTION, SOLUTION INTRA-ARTICULAR; INTRALESIONAL; INTRAMUSCULAR; INTRAVENOUS; SOFT TISSUE PRN
Status: DISCONTINUED | OUTPATIENT
Start: 2022-11-01 | End: 2022-11-01 | Stop reason: SURG

## 2022-11-01 RX ORDER — CEFAZOLIN SODIUM 1 G/3ML
2 INJECTION, POWDER, FOR SOLUTION INTRAMUSCULAR; INTRAVENOUS ONCE
Status: DISCONTINUED | OUTPATIENT
Start: 2022-11-01 | End: 2022-11-01 | Stop reason: HOSPADM

## 2022-11-01 RX ORDER — VANCOMYCIN HYDROCHLORIDE 1 G/20ML
INJECTION, POWDER, LYOPHILIZED, FOR SOLUTION INTRAVENOUS
Status: COMPLETED | OUTPATIENT
Start: 2022-11-01 | End: 2022-11-01

## 2022-11-01 RX ORDER — DIPHENHYDRAMINE HCL 25 MG
25 TABLET ORAL NIGHTLY PRN
Status: DISCONTINUED | OUTPATIENT
Start: 2022-11-02 | End: 2022-11-02 | Stop reason: HOSPADM

## 2022-11-01 RX ORDER — AMOXICILLIN 250 MG
1 CAPSULE ORAL NIGHTLY
Status: DISCONTINUED | OUTPATIENT
Start: 2022-11-01 | End: 2022-11-02 | Stop reason: HOSPADM

## 2022-11-01 RX ORDER — CEFAZOLIN SODIUM 1 G/3ML
INJECTION, POWDER, FOR SOLUTION INTRAMUSCULAR; INTRAVENOUS PRN
Status: DISCONTINUED | OUTPATIENT
Start: 2022-11-01 | End: 2022-11-01 | Stop reason: SURG

## 2022-11-01 RX ORDER — SODIUM CHLORIDE, SODIUM GLUCONATE, SODIUM ACETATE, POTASSIUM CHLORIDE AND MAGNESIUM CHLORIDE 526; 502; 368; 37; 30 MG/100ML; MG/100ML; MG/100ML; MG/100ML; MG/100ML
500 INJECTION, SOLUTION INTRAVENOUS CONTINUOUS
Status: DISCONTINUED | OUTPATIENT
Start: 2022-11-01 | End: 2022-11-01 | Stop reason: HOSPADM

## 2022-11-01 RX ORDER — EPINEPHRINE 0.1 MG/ML
SYRINGE (ML) INJECTION
Status: DISCONTINUED | OUTPATIENT
Start: 2022-11-01 | End: 2022-11-01 | Stop reason: HOSPADM

## 2022-11-01 RX ORDER — SCOLOPAMINE TRANSDERMAL SYSTEM 1 MG/1
1 PATCH, EXTENDED RELEASE TRANSDERMAL
Status: DISCONTINUED | OUTPATIENT
Start: 2022-11-01 | End: 2022-11-02 | Stop reason: HOSPADM

## 2022-11-01 RX ORDER — LIDOCAINE HYDROCHLORIDE 20 MG/ML
INJECTION, SOLUTION EPIDURAL; INFILTRATION; INTRACAUDAL; PERINEURAL PRN
Status: DISCONTINUED | OUTPATIENT
Start: 2022-11-01 | End: 2022-11-01 | Stop reason: SURG

## 2022-11-01 RX ORDER — MEPERIDINE HYDROCHLORIDE 25 MG/ML
12.5 INJECTION INTRAMUSCULAR; INTRAVENOUS; SUBCUTANEOUS
Status: DISCONTINUED | OUTPATIENT
Start: 2022-11-01 | End: 2022-11-01 | Stop reason: HOSPADM

## 2022-11-01 RX ORDER — ONDANSETRON 2 MG/ML
4 INJECTION INTRAMUSCULAR; INTRAVENOUS EVERY 4 HOURS PRN
Status: DISCONTINUED | OUTPATIENT
Start: 2022-11-01 | End: 2022-11-02 | Stop reason: HOSPADM

## 2022-11-01 RX ORDER — OXYCODONE HCL 5 MG/5 ML
10 SOLUTION, ORAL ORAL
Status: DISCONTINUED | OUTPATIENT
Start: 2022-11-01 | End: 2022-11-01 | Stop reason: HOSPADM

## 2022-11-01 RX ORDER — KETOROLAC TROMETHAMINE 30 MG/ML
INJECTION, SOLUTION INTRAMUSCULAR; INTRAVENOUS PRN
Status: DISCONTINUED | OUTPATIENT
Start: 2022-11-01 | End: 2022-11-01 | Stop reason: SURG

## 2022-11-01 RX ORDER — KETOROLAC TROMETHAMINE 30 MG/ML
INJECTION, SOLUTION INTRAMUSCULAR; INTRAVENOUS
Status: DISCONTINUED | OUTPATIENT
Start: 2022-11-01 | End: 2022-11-01 | Stop reason: HOSPADM

## 2022-11-01 RX ORDER — BISACODYL 10 MG
10 SUPPOSITORY, RECTAL RECTAL
Status: DISCONTINUED | OUTPATIENT
Start: 2022-11-01 | End: 2022-11-02 | Stop reason: HOSPADM

## 2022-11-01 RX ORDER — HYDROMORPHONE HYDROCHLORIDE 1 MG/ML
1 INJECTION, SOLUTION INTRAMUSCULAR; INTRAVENOUS; SUBCUTANEOUS
Status: DISCONTINUED | OUTPATIENT
Start: 2022-11-01 | End: 2022-11-01 | Stop reason: HOSPADM

## 2022-11-01 RX ORDER — OXYCODONE HYDROCHLORIDE 5 MG/1
5 TABLET ORAL
Status: DISCONTINUED | OUTPATIENT
Start: 2022-11-01 | End: 2022-11-02 | Stop reason: HOSPADM

## 2022-11-01 RX ORDER — KETOROLAC TROMETHAMINE 30 MG/ML
15 INJECTION, SOLUTION INTRAMUSCULAR; INTRAVENOUS EVERY 6 HOURS
Status: DISCONTINUED | OUTPATIENT
Start: 2022-11-01 | End: 2022-11-02 | Stop reason: HOSPADM

## 2022-11-01 RX ORDER — OXYCODONE HCL 5 MG/5 ML
5 SOLUTION, ORAL ORAL
Status: DISCONTINUED | OUTPATIENT
Start: 2022-11-01 | End: 2022-11-01 | Stop reason: HOSPADM

## 2022-11-01 RX ORDER — DOCUSATE SODIUM 100 MG/1
100 CAPSULE, LIQUID FILLED ORAL 2 TIMES DAILY
Status: DISCONTINUED | OUTPATIENT
Start: 2022-11-01 | End: 2022-11-02 | Stop reason: HOSPADM

## 2022-11-01 RX ORDER — ACETAMINOPHEN 500 MG
1000 TABLET ORAL EVERY 6 HOURS PRN
Status: DISCONTINUED | OUTPATIENT
Start: 2022-11-06 | End: 2022-11-02 | Stop reason: HOSPADM

## 2022-11-01 RX ORDER — POLYETHYLENE GLYCOL 3350 17 G/17G
1 POWDER, FOR SOLUTION ORAL 2 TIMES DAILY PRN
Status: DISCONTINUED | OUTPATIENT
Start: 2022-11-01 | End: 2022-11-02 | Stop reason: HOSPADM

## 2022-11-01 RX ORDER — METOPROLOL TARTRATE 1 MG/ML
INJECTION, SOLUTION INTRAVENOUS PRN
Status: DISCONTINUED | OUTPATIENT
Start: 2022-11-01 | End: 2022-11-01 | Stop reason: SURG

## 2022-11-01 RX ADMIN — EPHEDRINE SULFATE 10 MG: 50 INJECTION, SOLUTION INTRAVENOUS at 10:46

## 2022-11-01 RX ADMIN — KETOROLAC TROMETHAMINE 15 MG: 30 INJECTION, SOLUTION INTRAMUSCULAR; INTRAVENOUS at 23:59

## 2022-11-01 RX ADMIN — DEXAMETHASONE SODIUM PHOSPHATE 8 MG: 4 INJECTION, SOLUTION INTRA-ARTICULAR; INTRALESIONAL; INTRAMUSCULAR; INTRAVENOUS; SOFT TISSUE at 10:49

## 2022-11-01 RX ADMIN — ASPIRIN 81 MG: 81 TABLET, COATED ORAL at 23:59

## 2022-11-01 RX ADMIN — SUGAMMADEX 200 MG: 100 INJECTION, SOLUTION INTRAVENOUS at 11:43

## 2022-11-01 RX ADMIN — SENNOSIDES AND DOCUSATE SODIUM 1 TABLET: 50; 8.6 TABLET ORAL at 21:22

## 2022-11-01 RX ADMIN — ROCURONIUM BROMIDE 50 MG: 10 INJECTION, SOLUTION INTRAVENOUS at 10:46

## 2022-11-01 RX ADMIN — FENTANYL CITRATE 100 MCG: 50 INJECTION, SOLUTION INTRAMUSCULAR; INTRAVENOUS at 11:07

## 2022-11-01 RX ADMIN — KETOROLAC TROMETHAMINE 15 MG: 30 INJECTION, SOLUTION INTRAMUSCULAR; INTRAVENOUS at 17:12

## 2022-11-01 RX ADMIN — SODIUM CHLORIDE, POTASSIUM CHLORIDE, SODIUM LACTATE AND CALCIUM CHLORIDE: 600; 310; 30; 20 INJECTION, SOLUTION INTRAVENOUS at 10:42

## 2022-11-01 RX ADMIN — TRANEXAMIC ACID 1000 MG: 100 INJECTION, SOLUTION INTRAVENOUS at 10:49

## 2022-11-01 RX ADMIN — ACETAMINOPHEN 1000 MG: 500 TABLET ORAL at 23:58

## 2022-11-01 RX ADMIN — Medication 100 MCG: at 10:56

## 2022-11-01 RX ADMIN — ONDANSETRON 4 MG: 2 INJECTION INTRAMUSCULAR; INTRAVENOUS at 11:43

## 2022-11-01 RX ADMIN — LIDOCAINE HYDROCHLORIDE 50 MG: 20 INJECTION, SOLUTION EPIDURAL; INFILTRATION; INTRACAUDAL at 10:46

## 2022-11-01 RX ADMIN — KETOROLAC TROMETHAMINE 15 MG: 30 INJECTION, SOLUTION INTRAMUSCULAR at 11:43

## 2022-11-01 RX ADMIN — PROPOFOL 100 MG: 10 INJECTION, EMULSION INTRAVENOUS at 10:46

## 2022-11-01 RX ADMIN — METOPROLOL TARTRATE 5 MG: 5 INJECTION, SOLUTION INTRAVENOUS at 11:48

## 2022-11-01 RX ADMIN — ACETAMINOPHEN 1000 MG: 500 TABLET ORAL at 17:12

## 2022-11-01 RX ADMIN — CEFAZOLIN 2 G: 330 INJECTION, POWDER, FOR SOLUTION INTRAMUSCULAR; INTRAVENOUS at 10:44

## 2022-11-01 RX ADMIN — CEFAZOLIN 2 G: 2 INJECTION, POWDER, FOR SOLUTION INTRAMUSCULAR; INTRAVENOUS at 18:15

## 2022-11-01 ASSESSMENT — PAIN SCALES - GENERAL: PAIN_LEVEL: 0

## 2022-11-01 ASSESSMENT — LIFESTYLE VARIABLES
CONSUMPTION TOTAL: NEGATIVE
EVER HAD A DRINK FIRST THING IN THE MORNING TO STEADY YOUR NERVES TO GET RID OF A HANGOVER: NO
TOTAL SCORE: 0
ALCOHOL_USE: NO
ON A TYPICAL DAY WHEN YOU DRINK ALCOHOL HOW MANY DRINKS DO YOU HAVE: 0
EVER FELT BAD OR GUILTY ABOUT YOUR DRINKING: NO
HAVE YOU EVER FELT YOU SHOULD CUT DOWN ON YOUR DRINKING: NO
HAVE PEOPLE ANNOYED YOU BY CRITICIZING YOUR DRINKING: NO
AVERAGE NUMBER OF DAYS PER WEEK YOU HAVE A DRINK CONTAINING ALCOHOL: 0
HOW MANY TIMES IN THE PAST YEAR HAVE YOU HAD 5 OR MORE DRINKS IN A DAY: 0

## 2022-11-01 ASSESSMENT — COGNITIVE AND FUNCTIONAL STATUS - GENERAL
WALKING IN HOSPITAL ROOM: A LITTLE
SUGGESTED CMS G CODE MODIFIER MOBILITY: CK
DRESSING REGULAR UPPER BODY CLOTHING: A LITTLE
TOILETING: A LITTLE
MOVING TO AND FROM BED TO CHAIR: A LITTLE
MOVING FROM LYING ON BACK TO SITTING ON SIDE OF FLAT BED: A LITTLE
HELP NEEDED FOR BATHING: A LITTLE
EATING MEALS: A LITTLE
CLIMB 3 TO 5 STEPS WITH RAILING: A LOT
SUGGESTED CMS G CODE MODIFIER DAILY ACTIVITY: CK
PERSONAL GROOMING: A LITTLE
DAILY ACTIVITIY SCORE: 18
TURNING FROM BACK TO SIDE WHILE IN FLAT BAD: A LITTLE
STANDING UP FROM CHAIR USING ARMS: A LOT
MOBILITY SCORE: 16
DRESSING REGULAR LOWER BODY CLOTHING: A LITTLE

## 2022-11-01 ASSESSMENT — PAIN DESCRIPTION - PAIN TYPE
TYPE: SURGICAL PAIN
TYPE: ACUTE PAIN
TYPE: SURGICAL PAIN
TYPE: ACUTE PAIN
TYPE: ACUTE PAIN
TYPE: SURGICAL PAIN

## 2022-11-01 ASSESSMENT — PATIENT HEALTH QUESTIONNAIRE - PHQ9
1. LITTLE INTEREST OR PLEASURE IN DOING THINGS: NOT AT ALL
SUM OF ALL RESPONSES TO PHQ9 QUESTIONS 1 AND 2: 0
2. FEELING DOWN, DEPRESSED, IRRITABLE, OR HOPELESS: NOT AT ALL

## 2022-11-01 ASSESSMENT — FIBROSIS 4 INDEX
FIB4 SCORE: 2.54
FIB4 SCORE: 2.54

## 2022-11-01 NOTE — LETTER
October 13, 2022    Patient Name: Moshe Guerrero  Surgeon Name: Deshawn Del Valle M.D.  [facility] [facility]  Surgery Date: 11/1/2022    The time of your surgery is not final and may change up to and until the day of your surgery. You will be contacted 24-48 hours prior to your surgery date with your check-in and surgery time.    If you will not be at one of the below numbers please call the surgery scheduler at [phone]  Preferred Phone: [phone]    BEFORE YOUR SURGERY   Pre Registration and/or Lab Work must be done within and no earlier than 28 days prior to your surgery date. Please call [facility] at [phone] for an appointment as soon as possible.    DAY OF YOUR SURGERY  Nothing to eat or drink after midnight     Refrain from smoking any substance after midnight prior to surgery. Smoking may interfere with the anesthetic and frequently produces nausea during the recovery period.    Continue taking all lifesaving medications. Including the morning of your surgery with small sip of water.    Please do NOT take on the day of surgery:  Diuretics: examples- furosemide (Lasix), spironolactone, hydrochlorothiazide  ACE-inhibitors: examples- lisinopril, ramipril, enalapril  “ARBs”: examples- losartan, Olmesartan, valsartan    Please arrive at the hospital/surgery center at the check-in time provided.     An adult will need to bring you and take you home after your surgery.     AFTER YOUR SURGERY  Post op Appointment:   Date: 11/16/2022   Time: 09:00AM   With: ADRIA COULTER   Location: [address] [place] [address] [place] [id number]    - No dental work for 3-6 months after your surgery.  - Post Surgery - You will need someone to drive you home  - Post Surgery - You will need someone to stay with you for 24 hours  - You must have someone provide transportation post surgery and someone to monitor you for at least 24 hours  post-surgery. If you don't have either of these your appointment will be canceled.     TIME OFF WORK  FMLA or Disability forms can be faxed directly to: [phone] or you may drop them off at [address] [place] [address]. Our office charges a $35.00 fee per form. Forms will be completed within 10 business days of drop off and payment received. For the status of your forms you may contact our disability office directly [phone].    MEDICATION INSTRUCTIONS **Please read section completely**    The following medications should be stopped a minimum of 10 days prior to surgery:  All over the counter, Supplements & Herbal medications    Anorectics: Phentermine (Adipex-P, Lomaira and Suprenza), Phentermine-topiramate (Qsymia), Bupropion-naltrexone (Contrave)    Opiod Partial Agonists/Opioid Antagonists: Buprenorphine (Subocone, Belbuca, Butrans, Probuphine Implant, Sublocade), Naltrexone (ReVia, Vivitrol), Naloxone    Amphetamines: Dextroamphetamine/Amphetamine (Adderall, Mydayis), Methylphenidate Hydrochloride (Concerta, Metadate, Methylin, Ritalin)    The following medications should be stopped 5 days prior to surgery:  Blood Thinners: Any Aspirin, Aspirin products, anti-inflammatories such as ibuprofen and any blood thinners such as Coumadin and Plavix. Please consult your prescribing physician if you are on life saving blood thinners, in regards to when to stop medications prior to surgery.     The following medications should be stopped a minimum of 3 days prior to surgery:  PDE-5 inhibitors: Sildenafil (Viagra), Tadalafil (Cialis), Vardenafil (Levitra), Avanafil (Stendra)    MAO Inhibitors: Rasagiline (Azilect), Selegiline (Eldepryl, Emsam, Selapar), Isocarboxazid (Marplan), Phenelzine (Nardil)

## 2022-11-01 NOTE — OR NURSING
1159: To PACU from OR via bed, respirations spontaneous and non-labored, eyes open to voice, no verbal response. Stable on 10L mask. Ice pack applied over c/d/i right hip surgical dressings. RLE CMS intact.    1210: Pt is awake and verbally responsive. Denies pain and nausea. Remains stable on 6L mask. Ice pack in place. Dressing remains c/d/I.      1225:Pt remains awake and verbally responsive. Stable on RA. Denies pain and nausea. Ice pack remains in place with dressing c/d/I. Updated pt yuan Jiang via phone.     1240: No change in surgical site assessment. Given IS and instructed on use with goal of 3100, pt currently inhaling volumes of approx 1750.  Meets criteria to transfer to floor.     1310: Pt remains awake and alert. Denies pain and nausea. O2 sat on RA 89-91%. Placed pt back on 2L NC with O2 sat 97%. Dressing remains c/d/I/. Report called to Christy BARAHONA. Awaiting xray to be complete before transfer.    1330:Transferred to room 217 via bed on 2L NC with O2 tank @ Full

## 2022-11-01 NOTE — DISCHARGE INSTRUCTIONS
Patient will be discharged home and follow up with Dr. Del Valle in 2 weeks, for which the patient already has scheduled.    You may call or text Dr. Del Valle' medical assistant during business hours at [phone].  You may call the emergency [facility] line during nights/weekends/holidays if needed at [phone].    Instructions:  -Leave the bandage in place until your followup appointment in 2 weeks.  -May shower with bandage in place, if bandage becomes soaked underneath please remove and call the office immediately.  -No baths/tubs/pools/submersion of the wound for now.  -Call if there is significant drainage beneath the bandage    -Put as much weight as comfortable on the operative leg.  Use a walker to assist with balance to avoid any falls.  -It is important to start moving and stretching right away, otherwise the joint can stiffen.    -Take your blood clot prevention medication for 4 weeks after surgery (aspirin 81mg twice per day).    -Use ice frequently to help with pain and swelling control  -Pain management:  Standard pain regimen should be:        -Tylenol 1,000mg three times per day (take this automatically)        -Meloxicam one pill daily (take this automatically)        -Tramadol 1-2 pills every 6 hours (take this automatically to start)        -Oxycodone 1-2 pills as needed IN ADDITION to the meds listed above.  Do not take Oxycodone and Tramadol at the same time (space at least 1 hour apart)        -Try to limit the amount of oxycodone since it tends to cause constipation, drowsiness, etc.  But if you need it, take it.        -100mg of Colace (docusate) will be prescribed. Take this twice a day while still taking Tramadol or Oxycodone. Can discontinue after opiates are no longer being taken       Discharge Instructions    Discharged to home by car with relative. Discharged via wheelchair, hospital escort: Yes.  Special equipment needed: Walker    Be sure to schedule a follow-up appointment with your  primary care doctor or any specialists as instructed.     Discharge Plan:   Diet Plan: Discussed  Activity Level: Discussed  Confirmed Follow up Appointment: Appointment Scheduled  Confirmed Symptoms Management: Discussed  Medication Reconciliation Updated: Yes  Influenza Vaccine Indication: Patient Refuses    I understand that a diet low in cholesterol, fat, and sodium is recommended for good health. Unless I have been given specific instructions below for another diet, I accept this instruction as my diet prescription.   Other diet: regular    Special Instructions: Discharge instructions for the Orthopedic Patient    Follow up with Primary Care Physician within 2 weeks of discharge to home, regarding:  Review of medications and diagnostic testing.  Surveillance for medical complications.  Workup and treatment of osteoporosis, if appropriate.     -Is this a Hip/Knee/Shoulder Joint Replacement patient? Yes   TOTAL HIP REPLACEMENT, AFTER-CARE GUIDELINES     These instructions provide you with information on caring for yourself and your hip after surgery. Your health care provider may also give you instructions that are more specific. Your treatment was planned and performed according to current medical practices but problems sometimes occur. Call your health care provider if you have any problems or questions.     WHAT TO EXPECT AFTER THE PROCEDURE   After your procedure, your hip will typically be stiff, sore, and bruised. This will improve over time.     Pain   Follow your home pain management plan as discussed with your nurse and as directed by your provider.   It is important to follow any scheduled pain medications for maximal pain relief.   If prescribed opioid medication, the goal is to use opioids only as needed and to wean off prescription pain medicine as soon as possible.   Ice use for pain control.  Put ice in a plastic bag.   Place a towel between your skin and the bag.   Leave the ice on for 20 minutes,  2-3 times a day at a minimum.   Most patients are off the pain pills by 3 weeks. If your pain continues to be severe, follow up with your provider.     Infection   Deep hip joint infections that require removal of the prostheses occur in less than 1.0% of patients. Lesser infections in the skin (cellulites) are more common and much more easily treated.   Keep the incision as clean and dry as possible.   Always wash your hands before touching your incision.   Avoid dental care for 3 months after surgery. Your provider may recommend taking a dose of antibiotics an hour prior to any dental procedure. After 2 years, most providers recommend antibiotics only before an extensive procedure. Ask your provider what they recommend.   Signs and symptoms of infection include low-grade fever, redness, pain, swelling and drainage from your incision. Notify your provider IMMEDIATELY if you develop ANY of these symptoms.     Post op Disturbances   Bowel Habits - constipation is extremely common and caused by a combination of anesthesia, lack of mobility, dehydration and pain medicine. Use stool softeners or laxatives if necessary. It is important not to ignore this problem as bowel obstructions can be a serious complication after joint replacement surgery.   Mood/Energy Level - Many patients experience a lack of energy and endurance for up to 2-3 months after surgery. Some people feel down and can even become depressed. This is likely due to postoperative anemia, change in activity level, lack of sleep, pain medicine and just the emotional reaction to the surgery itself that is a big disruption in a person’s life. This usually passes. If symptoms persist, follow up with your primary care provider.   Returning to Work - Your provider will give you specific instructions based on your profession. Generally, if you work a sedentary job requiring little standing or walking, most patients may return within 2-6 weeks. Manual labor jobs  involving walking, lifting and standing may take 3-4 months. Your provider’s office can provide a release to part-time or light duty work early on in your recovery and progress you to full duty as able.   Driving - You can begin driving once cleared by your provider, provided you are no longer taking narcotic pain medication or any other medications that impair driving. Discuss the length of time with your doctor as returning to driving will depend on things such as your vehicle, which hip was replaced (right or left) and if you do or do not have post-operative movement precautions.   Avoiding falls - A fall during the first few weeks after surgery can damage your new hip and may result in a need for further surgery.  throw rugs and tack down loose carpeting. Be aware of floor hazards such as pets, small objects or uneven surfaces. Notify your provider of any falls.   Airport Metal Detectors - The sensitivity of metal detectors varies and it is likely that your prosthesis will cause an alarm. Inform the  of your artificial joint.     Diet   Resume your normal diet as tolerated.   It is important to achieve a healthy nutritional status by eating a well-balanced diet on a regular basis.   Your provider may recommend that you take iron and vitamin supplements.   Continue to drink plenty of fluids.     Shower/Bathing   You may shower as soon as you get home from the hospital unless otherwise instructed.   Keep your incision out of water to prevent infection. To keep the incision dry when showering, cover it with a plastic bag or plastic wrap. If your bandage is waterproof, this may not be necessary.   Pat incision dry if it gets wet. Do not rub. Notify your provider.   Do not submerge in a bath until cleared by your provider. Your staples must be out and the incision completely healed.     Dressing Changes: Only change your dressing if directed by your provider.   Wash hands.   Open all dressing  change materials.   Remove old dressing and discard.   Inspect incision for signs of irritation or infection including redness, increase in clear drainage, yellow/green drainage, odor and surrounding skin hot to touch. Notify your provider if present.    the new dressing by one corner and lay over the incision. Be careful not to touch the inside of the dressing that will lay over the incision.   Secure in place as instructed.     Swelling/Bruising   Swelling is normal after hip replacement and can involve the thigh, knee, calf and foot.   Swelling can last from 3-6 months.   To reduce swelling, elevate your leg higher than your heart while reclining. The first week you are home you should elevate your leg an equal amount of time as you are active.   The swelling is usually worse after you go home since you are upright for longer periods of time.   Bruising often does not appear until after you arrive home and can be quite dramatic- appearing purple, black, or green. Bruising is typically not concerning and will subside without any treatment.     Blood Clot Prevention   Your treatment plan includes multiple preventative measures to decrease the risk of blood clots in the legs (DVTs) and the less common, but serious, clots that travel to the lungs (pulmonary emboli). Most patients are at standard risk for them, but people who are at higher risk include those who have had previous clots, a family history of clotting, smoking, diabetes, obesity, advanced age, use estrogen and/or live a sedentary lifestyle.     Signs of blood clots in legs include - Swelling in thigh, calf or ankle that does not go down with elevation. Pain, heat and tenderness in calf, back of calf or groin area. NOTE: blood clots can occur in either leg.   Signs of blood clots in lungs include - Sudden increased shortness of breath, sudden onset of chest pain, and localized chest pain with coughing.   If you experience any of the above symptoms,  notify your provider and seek medical attention immediately.   You received anticoagulant therapy (blood thinners) in the hospital. Continue the prescribed blood-thinning medication at home, as directed by your provider.   Your risk for developing a clot continues for up to 2-3 months after surgery. Avoid prolonged sitting and dehydration (long air trips and car trips). If you do take a trip during this time, please get up, move around every 1-1.5 hours, and discuss all travel plans with your provider.     Activity   Once you get home, stay active. The key is not to overdo it. While you can expect some good days and some bad days, you should notice a gradual improvement over time and a gradual increase in endurance over the next 6 to 12 months.     Weight Bearing - You can begin to bear weight as tolerated unless otherwise directed by your provider. Use a walker, crutches or cane to assist with walking until you can walk smoothly (minimal or no limp) without assistance.   Physical Precautions - To assure proper recovery and tissue healing, you may be asked to take special precautions when moving (sitting, bending or sleeping) - usually for the first 6 weeks after surgery. Precautions vary from patient to patient depending on surgical approach used by your provider. Follow any specific precautions provided by your provider and physical therapist until cleared by your provider.   Sitting - Early on it is easier to get up from a tall chair or a chair with arms. The physical therapist will show you how to sit and stand from a chair keeping your affected leg out in front of you. Get up and move around on a regular basis--at least once every hour.   Walking - Walk as much as you like once your doctor gives permission to proceed, but remember that walking is no substitute for your prescribed exercises.  Follow the home exercise program prescribed during your hospital stay as directed by your physical therapist or provider.    Continued physical therapy may be prescribed after hospital discharge to strengthen your muscles and improve your gait/walking pattern. The decision to have therapy or not after the hospital stay is made by you and your provider prior to surgery or at your follow up appointment.   Swimming is an excellent low impact activity; you can begin as soon as the wound is healed and your provider clears you. Using a pair of training fins may make swimming a more enjoyable and effective exercise.   Sexual activity - Your provider can tell you when it is safe to resume sexual activity.   Other activities - Low impact activities are preferred. If you have specific questions, consult your provider.     When to Call the Doctor   Call the provider if you experience:   Fever over 100.5° F   Increased pain, drainage, redness, odor or heat around the incision area   Shaking chills   Increased knee pain with activity and rest   Increased pain in calf, tenderness or redness above or below the knee   Increased swelling of calf, ankle, foot   Sudden increased shortness of breath, sudden onset of chest pain, localized chest pain with coughing   Incision opening   Or, if there are any questions or concerns about medications or care.     Infection statistic resource:   https://www.[employer]/contents/prosthetic-joint-infection-epidemiology-microbiology-clinical-manifestations-and-diagnosis     -Is this patient being discharged with medication to prevent blood clots?  Yes, Aspirin   Aspirin, ASA oral tablets  What is this medicine?  ASPIRIN (AS pir in) is a pain reliever. It is used to treat mild pain and fever. This medicine is also used as directed by a doctor to prevent and to treat heart attacks, to prevent strokes and blood clots, and to treat arthritis or inflammation.  This medicine may be used for other purposes; ask your health care provider or pharmacist if you have questions.  COMMON BRAND NAME(S): Aspir-Low, Aspir-Latosha,  Aspirtab, Nehemiah Advanced Aspirin, Nehemiah Aspirin, Nehemiah Aspirin Extra Strength, Nehemiah Aspirin Plus, Nehemiah Extra Strength, Nehemiah Extra Strength Plus, Nehemiah Genuine Aspirin, Nehemiah Womens Aspirin, Bufferin, Bufferin Extra Strength, Bufferin Low Dose  What should I tell my health care provider before I take this medicine?  They need to know if you have any of these conditions:  anemia  asthma  bleeding problems  child with chickenpox, the flu, or other viral infection  diabetes  gout  if you frequently drink alcohol containing drinks  kidney disease  liver disease  low level of vitamin K  lupus  smoke tobacco  stomach ulcers or other problems  an unusual or allergic reaction to aspirin, tartrazine dye, other medicines, dyes, or preservatives  pregnant or trying to get pregnant  breast-feeding  How should I use this medicine?  Take this medicine by mouth with a glass of water. Follow the directions on the package or prescription label. You can take this medicine with or without food. If it upsets your stomach, take it with food. Do not take your medicine more often than directed.  Talk to your pediatrician regarding the use of this medicine in children. While this drug may be prescribed for children as young as 12 years of age for selected conditions, precautions do apply. Children and teenagers should not use this medicine to treat chicken pox or flu symptoms unless directed by a doctor.  Patients over 65 years old may have a stronger reaction and need a smaller dose.  Overdosage: If you think you have taken too much of this medicine contact a poison control center or emergency room at once.  NOTE: This medicine is only for you. Do not share this medicine with others.  What if I miss a dose?  If you are taking this medicine on a regular schedule and miss a dose, take it as soon as you can. If it is almost time for your next dose, take only that dose. Do not take double or extra doses.  What may interact with this  medicine?  Do not take this medicine with any of the following medications:  cidofovir  ketorolac  probenecid  This medicine may also interact with the following medications:  alcohol  alendronate  bismuth subsalicylate  flavocoxid  herbal supplements like feverfew, garlic, pb, ginkgo biloba, horse chestnut  medicines for diabetes or glaucoma like acetazolamide, methazolamide  medicines for gout  medicines that treat or prevent blood clots like enoxaparin, heparin, ticlopidine, warfarin  other aspirin and aspirin-like medicines  NSAIDs, medicines for pain and inflammation, like ibuprofen or naproxen  pemetrexed  sulfinpyrazone  varicella live vaccine  This list may not describe all possible interactions. Give your health care provider a list of all the medicines, herbs, non-prescription drugs, or dietary supplements you use. Also tell them if you smoke, drink alcohol, or use illegal drugs. Some items may interact with your medicine.  What should I watch for while using this medicine?  If you are treating yourself for pain, tell your doctor or health care professional if the pain lasts more than 10 days, if it gets worse, or if there is a new or different kind of pain. Tell your doctor if you see redness or swelling. Also, check with your doctor if you have a fever that lasts for more than 3 days. Only take this medicine to prevent heart attacks or blood clotting if prescribed by your doctor or health care professional.  Do not take aspirin or aspirin-like medicines with this medicine. Too much aspirin can be dangerous. Always read the labels carefully.  This medicine can irritate your stomach or cause bleeding problems. Do not smoke cigarettes or drink alcohol while taking this medicine. Do not lie down for 30 minutes after taking this medicine to prevent irritation to your throat.  If you are scheduled for any medical or dental procedure, tell your healthcare provider that you are taking this medicine. You may  need to stop taking this medicine before the procedure.  This medicine may be used to treat migraines. If you take migraine medicines for 10 or more days a month, your migraines may get worse. Keep a diary of headache days and medicine use. Contact your healthcare professional if your migraine attacks occur more frequently.  What side effects may I notice from receiving this medicine?  Side effects that you should report to your doctor or health care professional as soon as possible:  allergic reactions like skin rash, itching or hives, swelling of the face, lips, or tongue  breathing problems  changes in hearing, ringing in the ears  confusion  general ill feeling or flu-like symptoms  pain on swallowing  redness, blistering, peeling or loosening of the skin, including inside the mouth or nose  signs and symptoms of bleeding such as bloody or black, tarry stools; red or dark-brown urine; spitting up blood or brown material that looks like coffee grounds; red spots on the skin; unusual bruising or bleeding from the eye, gums, or nose  trouble passing urine or change in the amount of urine  unusually weak or tired  yellowing of the eyes or skin  Side effects that usually do not require medical attention (report to your doctor or health care professional if they continue or are bothersome):  diarrhea or constipation  headache  nausea, vomiting  stomach gas, heartburn  This list may not describe all possible side effects. Call your doctor for medical advice about side effects. You may report side effects to FDA at 6-202-FDA-2405.  Where should I keep my medicine?  Keep out of the reach of children.  Store at room temperature between 15 and 30 degrees C (59 and 86 degrees F). Protect from heat and moisture. Do not use this medicine if it has a strong vinegar smell. Throw away any unused medicine after the expiration date.  NOTE: This sheet is a summary. It may not cover all possible information. If you have questions  about this medicine, talk to your doctor, pharmacist, or health care provider.  © 2020 [employer] (2018-01-30 10:42:13)    -Is this patient being discharged with medication to prevent blood clots?  Yes, Aspirin   Aspirin, ASA oral tablets  What is this medicine?  ASPIRIN (AS pir in) is a pain reliever. It is used to treat mild pain and fever. This medicine is also used as directed by a doctor to prevent and to treat heart attacks, to prevent strokes and blood clots, and to treat arthritis or inflammation.  This medicine may be used for other purposes; ask your health care provider or pharmacist if you have questions.  COMMON BRAND NAME(S): Aspir-Low, Aspir-Latosha, Aspirtab, Nehemiah Advanced Aspirin, Nehemiah Aspirin, Nehemiah Aspirin Extra Strength, Nehemiah Aspirin Plus, Nehemiah Extra Strength, Nehemiah Extra Strength Plus, Nehemiah Genuine Aspirin, Nehemiah Womens Aspirin, Bufferin, Bufferin Extra Strength, Bufferin Low Dose  What should I tell my health care provider before I take this medicine?  They need to know if you have any of these conditions:  anemia  asthma  bleeding problems  child with chickenpox, the flu, or other viral infection  diabetes  gout  if you frequently drink alcohol containing drinks  kidney disease  liver disease  low level of vitamin K  lupus  smoke tobacco  stomach ulcers or other problems  an unusual or allergic reaction to aspirin, tartrazine dye, other medicines, dyes, or preservatives  pregnant or trying to get pregnant  breast-feeding  How should I use this medicine?  Take this medicine by mouth with a glass of water. Follow the directions on the package or prescription label. You can take this medicine with or without food. If it upsets your stomach, take it with food. Do not take your medicine more often than directed.  Talk to your pediatrician regarding the use of this medicine in children. While this drug may be prescribed for children as young as 12 years of age for selected conditions,  precautions do apply. Children and teenagers should not use this medicine to treat chicken pox or flu symptoms unless directed by a doctor.  Patients over 65 years old may have a stronger reaction and need a smaller dose.  Overdosage: If you think you have taken too much of this medicine contact a poison control center or emergency room at once.  NOTE: This medicine is only for you. Do not share this medicine with others.  What if I miss a dose?  If you are taking this medicine on a regular schedule and miss a dose, take it as soon as you can. If it is almost time for your next dose, take only that dose. Do not take double or extra doses.  What may interact with this medicine?  Do not take this medicine with any of the following medications:  cidofovir  ketorolac  probenecid  This medicine may also interact with the following medications:  alcohol  alendronate  bismuth subsalicylate  flavocoxid  herbal supplements like feverfew, garlic, pb, ginkgo biloba, horse chestnut  medicines for diabetes or glaucoma like acetazolamide, methazolamide  medicines for gout  medicines that treat or prevent blood clots like enoxaparin, heparin, ticlopidine, warfarin  other aspirin and aspirin-like medicines  NSAIDs, medicines for pain and inflammation, like ibuprofen or naproxen  pemetrexed  sulfinpyrazone  varicella live vaccine  This list may not describe all possible interactions. Give your health care provider a list of all the medicines, herbs, non-prescription drugs, or dietary supplements you use. Also tell them if you smoke, drink alcohol, or use illegal drugs. Some items may interact with your medicine.  What should I watch for while using this medicine?  If you are treating yourself for pain, tell your doctor or health care professional if the pain lasts more than 10 days, if it gets worse, or if there is a new or different kind of pain. Tell your doctor if you see redness or swelling. Also, check with your doctor if  you have a fever that lasts for more than 3 days. Only take this medicine to prevent heart attacks or blood clotting if prescribed by your doctor or health care professional.  Do not take aspirin or aspirin-like medicines with this medicine. Too much aspirin can be dangerous. Always read the labels carefully.  This medicine can irritate your stomach or cause bleeding problems. Do not smoke cigarettes or drink alcohol while taking this medicine. Do not lie down for 30 minutes after taking this medicine to prevent irritation to your throat.  If you are scheduled for any medical or dental procedure, tell your healthcare provider that you are taking this medicine. You may need to stop taking this medicine before the procedure.  This medicine may be used to treat migraines. If you take migraine medicines for 10 or more days a month, your migraines may get worse. Keep a diary of headache days and medicine use. Contact your healthcare professional if your migraine attacks occur more frequently.  What side effects may I notice from receiving this medicine?  Side effects that you should report to your doctor or health care professional as soon as possible:  allergic reactions like skin rash, itching or hives, swelling of the face, lips, or tongue  breathing problems  changes in hearing, ringing in the ears  confusion  general ill feeling or flu-like symptoms  pain on swallowing  redness, blistering, peeling or loosening of the skin, including inside the mouth or nose  signs and symptoms of bleeding such as bloody or black, tarry stools; red or dark-brown urine; spitting up blood or brown material that looks like coffee grounds; red spots on the skin; unusual bruising or bleeding from the eye, gums, or nose  trouble passing urine or change in the amount of urine  unusually weak or tired  yellowing of the eyes or skin  Side effects that usually do not require medical attention (report to your doctor or health care professional  if they continue or are bothersome):  diarrhea or constipation  headache  nausea, vomiting  stomach gas, heartburn  This list may not describe all possible side effects. Call your doctor for medical advice about side effects. You may report side effects to FDA at 2-872-QOP-6600.  Where should I keep my medicine?  Keep out of the reach of children.  Store at room temperature between 15 and 30 degrees C (59 and 86 degrees F). Protect from heat and moisture. Do not use this medicine if it has a strong vinegar smell. Throw away any unused medicine after the expiration date.  NOTE: This sheet is a summary. It may not cover all possible information. If you have questions about this medicine, talk to your doctor, pharmacist, or health care provider.  © 2020 [employer]vier/Gold Standard (2018-01-30 10:42:13)    Is patient discharged on Warfarin / Coumadin?   No

## 2022-11-01 NOTE — PROGRESS NOTES
1300-Received report from PACU RN.   1340- Pt arrived to floor via hospital bed.  Assumed care of pt.   Assessment and chart check complete.   Pt is AAOX4, no signs of distress, denies nausea/vomiting.   Dressing CDI with dorsiflex and plantar flex both feet, palpable pulses on both LE.   SCD's and polar ice use.   Fall precautions in place, treaded socks on pt, bed in low position.   Call light within reach.   Educated pt to call if needing anything.   Hourly rounding.

## 2022-11-01 NOTE — OP REPORT
Pre-operative Dx: Severe right hip degenerative disease  Post-operative Dx: same  Procedure:  Right total hip arthroplasty  Surgeon:  Dr. Deshawn Del Valle MD  Assistants: Holden Hawk PA-C  Anesthesia:  Dr. Perez.  General anesthetic  EBL:  150 mL  Drains:  None  Complications:  None    Findings: Severe degenerative change.  Excellent press-fit of the acetabular and femoral components.  Leg lengths were equal when checked with intraoperative fluoroscopy as well as palpation at the knees.  Soft tissue tension and stability were appropriate.  Bone was ok, but I cemented the stem due to age and fall risk.    Implants:  [employer] R3 size 60 cup with an oxinium liner.  The stem was a size 4 standard offset Polar cemented stem with a 28 + 0 oxinium head inside the dual mobility poly head.    Indications: Moshe Guerrero is a 87 year old male who has had severe progressive groin pain that failed conservative management. There were severe degenerative changes on radiographs and physical examination was consistent with intraarticular pathology.  He was ultimately indicated for a hip replacement.  We discussed the risk of bleeding, transfusion, pain, neurovascular injury, leg length discrepancy, dislocation, fracture, infection, wound complication, and medical complication.  No guarantees were made.  He was in agreement and elected to proceed.    Description of Procedure:    He was identified in the preoperative holding area and informed consent and site marking were confirmed. He was then brought to the operating room. Anesthesia was administered. He was positioned supine on the operative table with appropriate padding of the extremities. Sterile prepping and draping of the surgical field was completed. I performed a pre timeout to confirm we had the correct patient, side, site, presence of necessary personal, and equipment.  I confirmed that pre-operative antibiotics were administered including Ancef as well as  tranexamic acid.    A direct anterior approach to the hip was completed. Anterior capsulotomy was completed for exposure of the hip joint. An in-situ femoral neck osteotomy was completed and the femoral head was extracted from the acetabulum. Circumferential exposure of the acetabulum was completed. Labrectomy was completed. Osteophytes were removed from the acetabular rim. Acetabular reaming was completed under direct visualization. A hemispherical press fit component was inserted using fluoroscopic navigation for alignment and depth.  An acetabular liner was locked in place and checked to ensure proper locking.    Attention was then focused to the femur. The operative leg was placed in extension, external rotation, and adduction. A posterior capsulotomy was completed to assist with femoral mobilization. The femur was then sequentially broached under direct visualization to the desired broach size. The hip was reduced and evaluated using fluoroscopy for component alignment, component sizing, leg length,offset, external rotation to 90 degrees to make sure there was no posterior impingement or anterior subluxation. The hip was dislocated with traction and external rotation. Femoral exposure was repeated. The broach was removed and the final femoral stem was cemented with a restrictor and pressurization. The final femoral head was placed on a clean and dry trunnion. The hip was reduced. Final fluoroscopic images were completed.    Irrigation of the operative field was followed by layered closure. One gram of Vancomycin powder was left deep in the wound.  The capsule and fascia were closed with a  Quill suture.  Skin closure was completed with subcuticular monocryl, running monocryl, and Dermabond with an overlying occlusive silver dressing. The patient tolerated the procedure well and was taken to the recovery room in stable condition. All counts were correct.      Postoperative plan:  WBAT, DVT ppx for 4 weeks  including aspirin 81mg BID, PT/OT eval.  Followup in 2 weeks in clinic.

## 2022-11-01 NOTE — ANESTHESIA PROCEDURE NOTES
Airway    Date/Time: 11/1/2022 10:46 AM  Performed by: Timothy Peerz III, M.D.  Authorized by: Timothy Perez III, M.D.     Location:  OR  Urgency:  Elective  Difficult Airway: No    Indications for Airway Management:  Anesthesia      Spontaneous Ventilation: absent    Sedation Level:  Deep  Preoxygenated: Yes    Final Airway Type:  Supraglottic airway  Final Supraglottic Airway:  Standard LMA    SGA Size:  4  Number of Attempts at Approach:  1

## 2022-11-01 NOTE — H&P
Surgery Orthopedic History & Physical Note    Date  2022    Primary Care Physician  COLEEN Keller  Pre-Op Diagnosis Codes:     * Primary osteoarthritis of right hip [M16.11]    HPI  This is a 87 y.o. male who presents for a ТАТЬЯНА.    Past Medical History:   Diagnosis Date    Arthritis     Dental disorder     Full upper, permanent partial bottom    Heart burn     High cholesterol     Hyperlipidemia     Macular degeneration of both eyes        Past Surgical History:   Procedure Laterality Date    HERNIA REPAIR Bilateral     inguinal x3    HIP REPLACEMENT, TOTAL Left        Current Facility-Administered Medications   Medication Dose Route Frequency Provider Last Rate Last Admin    ceFAZolin (ANCEF) injection 2 g  2 g Intravenous Once Deshawn Del Valle M.D.        lidocaine (XYLOCAINE) 1 % injection 0.5 mL  0.5 mL Intradermal Once PRN Deshawn Del Valle M.D.        lactated ringers infusion   Intravenous Continuous Deshawn Del Valle M.D.           Social History     Socioeconomic History    Marital status:      Spouse name: Not on file    Number of children: Not on file    Years of education: Not on file    Highest education level: Not on file   Occupational History    Not on file   Tobacco Use    Smoking status: Former     Packs/day: 3.00     Years: 10.00     Pack years: 30.00     Types: Cigarettes     Quit date: 2003     Years since quittin.8    Smokeless tobacco: Never   Vaping Use    Vaping Use: Never used   Substance and Sexual Activity    Alcohol use: Yes     Comment: Occ     Drug use: No    Sexual activity: Never   Other Topics Concern    Not on file   Social History Narrative    Not on file     Social Determinants of Health     Financial Resource Strain: Not on file   Food Insecurity: Not on file   Transportation Needs: Not on file   Physical Activity: Not on file   Stress: Not on file   Social Connections: Not on file   Intimate Partner Violence: Not on file   Housing  Stability: Not on file       History reviewed. No pertinent family history.    Allergies  Patient has no known allergies.    Review of Systems  Negative    Physical Exam  Regular rate and rhythm  Nonlabored breathing  Abdomen soft and nontender   Neurovascular intact distally  Skin is in good condition    Vital Signs  Blood Pressure : 121/61   Temperature: (!) 35.1 °C (95.1 °F)   Pulse: 72   Respiration: 16   Pulse Oximetry: 96 %       Labs:                    Radiology:  DX-PORTABLE FLUOROSCOPY < 1 HOUR    (Results Pending)   DX-HIP-UNILATERAL-WITH PELVIS-1 VIEW RIGHT    (Results Pending)         Assessment/Plan:  Pre-Op Diagnosis Codes:     * Primary osteoarthritis of right hip [M16.11]  Procedure(s):  ARTHROPLASTY, HIP, TOTAL, ANTERIOR APPROACH RIGHT

## 2022-11-01 NOTE — ANESTHESIA PREPROCEDURE EVALUATION
Case: [id number] Date/Time: 11/01/22 1045    Procedure: ARTHROPLASTY, HIP, TOTAL, ANTERIOR APPROACH (Right)    Diagnosis: Primary osteoarthritis of right hip [M16.11]    Pre-op diagnosis: Primary osteoarthritis of right hip [M16.11]    Location:  OR  / SURGERY [facility]    Surgeons: Deshawn Del Valle M.D.          Relevant Problems   No relevant active problems       Physical Exam    Airway   Mallampati: II  TM distance: >3 FB  Neck ROM: limited       Cardiovascular - normal exam  Rhythm: regular  Rate: normal  (-) murmur     Dental - normal exam           Pulmonary - normal exam  Breath sounds clear to auscultation     Abdominal    Neurological - normal exam                 Anesthesia Plan    ASA 3       Plan - general       Airway plan will be LMA    (Elevated Frailty level, chronic pain)      Induction: intravenous    Postoperative Plan: Postoperative administration of opioids is intended.    Pertinent diagnostic labs and testing reviewed    Informed Consent:    Anesthetic plan and risks discussed with patient.    Use of blood products discussed with: patient whom consented to blood products.

## 2022-11-01 NOTE — ANESTHESIA TIME REPORT
Anesthesia Start and Stop Event Times     Date Time Event    11/1/2022 1025 Ready for Procedure     1042 Anesthesia Start     1202 Anesthesia Stop        Responsible Staff  11/01/22    Name Role Begin End    Timothy Perez III, M.D. Anesth 1042 1202        Overtime Reason:  no overtime (within assigned shift)    Comments:

## 2022-11-01 NOTE — PROGRESS NOTES
4 Eyes Skin Assessment Completed by BROOKLYN Garcia and BROOKLYN Montenegro.    Head WDL  Ears WDL  Nose WDL  Mouth WDL  Neck WDL  Breast/Chest Scab  Shoulder Blades WDL  Spine WDL  (R) Arm/Elbow/Hand Bruising, Abrasion, and Scab  (L) Arm/Elbow/Hand Bruising, Abrasion, and Scab  Abdomen WDL  Groin WDL  Scrotum/Coccyx/Buttocks Redness, Blanching, and Scab  (R) Leg Scab, flaky  (L) Leg Abrasion, Redness, dusky PTA, incision post ТАТЬЯНА  (R) Heel/Foot/Toe Redness, blister toe, flaky  (L) Heel/Foot/Toe Flaky          Devices In Places Blood Pressure Cuff, Pulse Ox, and SCD's      Interventions In Place Pillows, polar ice    Possible Skin Injury No    Pictures Uploaded Into Epic N/A  Wound Consult Placed N/A  RN Wound Prevention Protocol Ordered No

## 2022-11-01 NOTE — ANESTHESIA POSTPROCEDURE EVALUATION
Patient: Moshe Guerrero    Procedure Summary     Date: 11/01/22 Room / Location:  OR 06 / SURGERY [facility]    Anesthesia Start: 1042 Anesthesia Stop: 1202    Procedure: ARTHROPLASTY, HIP, TOTAL, ANTERIOR APPROACH (Right: Hip) Diagnosis:       Primary osteoarthritis of right hip      (Primary osteoarthritis of right hip [616715])    Surgeons: Deshawn Del Valle M.D. Responsible Provider: Timothy Perez III, M.D.    Anesthesia Type: general ASA Status: 3          Final Anesthesia Type: general  Last vitals  BP   Blood Pressure : 108/53    Temp   36.8 °C (98.2 °F)    Pulse   72   Resp   16    SpO2   92 %      Anesthesia Post Evaluation    Patient location during evaluation: PACU  Patient participation: complete - patient participated  Level of consciousness: awake and alert  Pain score: 0    Airway patency: patent  Anesthetic complications: no  Cardiovascular status: hemodynamically stable  Respiratory status: acceptable  Hydration status: euvolemic    PONV: none          No notable events documented.     Nurse Pain Score: 0 (NPRS)

## 2022-11-02 VITALS
HEART RATE: 70 BPM | WEIGHT: 138.89 LBS | OXYGEN SATURATION: 98 % | BODY MASS INDEX: 18.81 KG/M2 | DIASTOLIC BLOOD PRESSURE: 51 MMHG | SYSTOLIC BLOOD PRESSURE: 126 MMHG | HEIGHT: 72 IN | TEMPERATURE: 98 F | RESPIRATION RATE: 16 BRPM

## 2022-11-02 LAB
HCT VFR BLD AUTO: 31.2 % (ref 42–52)
HGB BLD-MCNC: 10 G/DL (ref 14–18)

## 2022-11-02 PROCEDURE — G0378 HOSPITAL OBSERVATION PER HR: HCPCS

## 2022-11-02 PROCEDURE — 97535 SELF CARE MNGMENT TRAINING: CPT

## 2022-11-02 PROCEDURE — 96376 TX/PRO/DX INJ SAME DRUG ADON: CPT

## 2022-11-02 PROCEDURE — 99024 POSTOP FOLLOW-UP VISIT: CPT | Performed by: ORTHOPAEDIC SURGERY

## 2022-11-02 PROCEDURE — 700111 HCHG RX REV CODE 636 W/ 250 OVERRIDE (IP): Performed by: STUDENT IN AN ORGANIZED HEALTH CARE EDUCATION/TRAINING PROGRAM

## 2022-11-02 PROCEDURE — 700102 HCHG RX REV CODE 250 W/ 637 OVERRIDE(OP): Performed by: STUDENT IN AN ORGANIZED HEALTH CARE EDUCATION/TRAINING PROGRAM

## 2022-11-02 PROCEDURE — 36415 COLL VENOUS BLD VENIPUNCTURE: CPT

## 2022-11-02 PROCEDURE — 700105 HCHG RX REV CODE 258: Performed by: STUDENT IN AN ORGANIZED HEALTH CARE EDUCATION/TRAINING PROGRAM

## 2022-11-02 PROCEDURE — 97165 OT EVAL LOW COMPLEX 30 MIN: CPT

## 2022-11-02 PROCEDURE — 97162 PT EVAL MOD COMPLEX 30 MIN: CPT

## 2022-11-02 PROCEDURE — 85018 HEMOGLOBIN: CPT

## 2022-11-02 PROCEDURE — 85014 HEMATOCRIT: CPT

## 2022-11-02 PROCEDURE — A9270 NON-COVERED ITEM OR SERVICE: HCPCS | Performed by: STUDENT IN AN ORGANIZED HEALTH CARE EDUCATION/TRAINING PROGRAM

## 2022-11-02 RX ADMIN — DEXAMETHASONE SODIUM PHOSPHATE 10 MG: 4 INJECTION, SOLUTION INTRA-ARTICULAR; INTRALESIONAL; INTRAMUSCULAR; INTRAVENOUS; SOFT TISSUE at 06:02

## 2022-11-02 RX ADMIN — CEFAZOLIN 2 G: 2 INJECTION, POWDER, FOR SOLUTION INTRAMUSCULAR; INTRAVENOUS at 03:12

## 2022-11-02 RX ADMIN — DOCUSATE SODIUM 100 MG: 100 CAPSULE, LIQUID FILLED ORAL at 06:02

## 2022-11-02 RX ADMIN — ACETAMINOPHEN 1000 MG: 500 TABLET ORAL at 11:59

## 2022-11-02 RX ADMIN — KETOROLAC TROMETHAMINE 15 MG: 30 INJECTION, SOLUTION INTRAMUSCULAR; INTRAVENOUS at 06:02

## 2022-11-02 RX ADMIN — KETOROLAC TROMETHAMINE 15 MG: 30 INJECTION, SOLUTION INTRAMUSCULAR; INTRAVENOUS at 11:59

## 2022-11-02 RX ADMIN — ACETAMINOPHEN 1000 MG: 500 TABLET ORAL at 06:01

## 2022-11-02 ASSESSMENT — COGNITIVE AND FUNCTIONAL STATUS - GENERAL
SUGGESTED CMS G CODE MODIFIER DAILY ACTIVITY: CI
MOBILITY SCORE: 24
DAILY ACTIVITIY SCORE: 23
SUGGESTED CMS G CODE MODIFIER MOBILITY: CH
HELP NEEDED FOR BATHING: A LITTLE

## 2022-11-02 ASSESSMENT — GAIT ASSESSMENTS
DISTANCE (FEET): 15
GAIT LEVEL OF ASSIST: SUPERVISED
DISTANCE (FEET): 100
ASSISTIVE DEVICE: FRONT WHEEL WALKER
DEVIATION: STEP TO

## 2022-11-02 ASSESSMENT — PAIN DESCRIPTION - PAIN TYPE: TYPE: SURGICAL PAIN

## 2022-11-02 ASSESSMENT — ACTIVITIES OF DAILY LIVING (ADL): TOILETING: INDEPENDENT

## 2022-11-02 NOTE — PROGRESS NOTES
" Subjective:    No overnight events.  Mobilizing well.  Pain reasonably well controlled.    Objective:  /50   Pulse 70   Temp 36.4 °C (97.6 °F) (Oral)   Resp 16   Ht 1.831 m (6' 0.09\")   Wt 63 kg (138 lb 14.2 oz)   SpO2 96%     Recent Labs     11/02/22  0209   HEMOGLOBIN 10.0*   HEMATOCRIT 31.2*             Intake/Output Summary (Last 24 hours) at 11/2/2022 0652  Last data filed at 11/2/2022 0046  Gross per 24 hour   Intake 1460 ml   Output 295 ml   Net 1165 ml       Comfortable, no distress  Neurologically and vascularly intact with palpable pedal pulses bilaterally.  Dressing C/D/I    Assessment:    Doing well s/p total hip arthroplasty.    Plan:    Diet as tolerated  WBAT  OT/PT  Pain control/Ice   DVT ppx - ASA BID + Sequential Compression Devices  Plan to discharge home  Follow-up approximately 2 weeks post-op. [phone]    "

## 2022-11-02 NOTE — PROGRESS NOTES
Received report from day shift RN.   Assumed care of pt.   Assessment and chart check complete.   Pt is AAOX4, no signs of distress, denies nausea/vomiting.   Dressing CDI with dorsiflex and plantar flex both feet, palpable pulses on both LE.   CMS intact.  SCD's and polar ice use.  Encouraged IS.  Fall precautions in place, treaded socks on pt, bed in low position.   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  Educated pt to call if needing anything.   Call light within reach.   Hourly rounding.

## 2022-11-02 NOTE — THERAPY
Occupational Therapy   Initial Evaluation     Patient Name: Moshe Guerrero  Age:  87 y.o., Sex:  male  Medical Record #: [id number]  Today's Date: 11/2/2022     Precautions  Precautions: (P) Anterior Hip Precautions, Weight Bearing As Tolerated Right Lower Extremity    Assessment  Patient is 87 y.o. male with a diagnosis of Right ТАТЬЯНА.  Noted Right hip discomfort, F+ standing balance S/D.  Patient reported living alone in a [facility].  PLOF Mod Indep to Indep for ADL's/transfers.  Patient reported family and friends in the area available to assist as needed.  Therapist reviewed/educated patient on environmental/safety awareness, anterior hip precautions, fall precautions, AE/DME and ADL's/transfers.  No further skilled occupational therapy services recommended at this time.  Plan    Recommend Occupational Therapy for Evaluation only.    DC Equipment Recommendations: (P) None  Discharge Recommendations: (P) Anticipate that the patient will have no further occupational therapy needs after discharge from the hospital     Subjective    Patient was alert, oriented and cooperative w/ tx.     Objective       11/02/22 1517   Initial Contact Note    Initial Contact Note Order Received and Verified, Evaluation Only - Patient Does Not Require Further Acute Occupational Therapy at this Time.  However, May Benefit from Post Acute Therapy for Higher Level Functional Deficits.   Prior Living Situation   Prior Services None   Housing / Facility 1 Story House   Steps Into Home 2   Steps In Home 0   Rail None   Bathroom Set up Walk In Shower;Shower Glass Doors;Shower Chair;Grab Bars   Equipment Owned Front-Wheel Walker;Single Point Cane;Tub / Shower Seat;Grab Bar(s) In Tub / Shower;Raised Toilet Seat Without Arms   Lives with - Patient's Self Care Capacity Alone and Able to Care For Self   Comments patient reported living alone in a [facility].  PLOF Mod Indep to Indep for ADL's/transfers.  Patient reported family and friends in the area available  to assist as needed.   Prior Level of ADL Function   Self Feeding Independent   Grooming / Hygiene Independent   Bathing Independent   Dressing Independent   Toileting Independent   Prior Level of IADL Function   Medication Management Independent   Laundry Independent   Kitchen Mobility Independent   Finances Independent   Home Management Independent   Prior Level Of Mobility Independent With Device in Home   History of Falls   History of Falls Yes   Precautions   Precautions Anterior Hip Precautions;Weight Bearing As Tolerated Right Lower Extremity   Vitals   Pulse Oximetry 98 %   O2 Delivery Device None - Room Air   Pain   Intervention Declines   Non Verbal Descriptors   Non Verbal Scale  Calm;Unlabored Breathing   Cognition    Cognition / Consciousness WDL   Active ROM Upper Body   Active ROM Upper Body  X   Dominant Hand Right   Rt Shoulder Flexion Degrees 90   Rt Shoulder Abduction Degrees 70   Lt Shoulder Flexion Degrees 90   Lt. Shoulder Abduction Degrees 70   Strength Upper Body   Upper Body Strength  WDL   Upper Body Muscle Tone   Upper Body Muscle Tone  WDL   Coordination Upper Body   Coordination WDL   Balance Assessment   Sitting Balance (Static) Good   Sitting Balance (Dynamic) Good   Standing Balance (Static) Fair +   Standing Balance (Dynamic) Fair +   Weight Shift Sitting Good   Weight Shift Standing Fair   Comments FWW   Bed Mobility    Supine to Sit Modified Independent   Sit to Supine Modified Independent   Scooting Modified Independent   ADL Assessment   Eating Independent   Grooming Modified Independent;Standing   Upper Body Dressing Independent   Lower Body Dressing Modified Independent   Toileting Modified Independent   How much help from another person does the patient currently need...   Putting on and taking off regular lower body clothing? 4   Bathing (including washing, rinsing, and drying)? 3   Toileting, which includes using a toilet, bedpan, or urinal? 4   Putting on and taking off  regular upper body clothing? 4   Taking care of personal grooming such as brushing teeth? 4   Eating meals? 4   6 Clicks Daily Activity Score 23   Functional Mobility   Sit to Stand Supervised   Bed, Chair, Wheelchair Transfer Supervised   Transfer Method Stand Step   Mobility Sup FWW, EOB<>commode + sink   Distance (Feet) 15   # of Times Distance was Traveled 2   Edema / Skin Assessment   Edema / Skin  Not Assessed   Activity Tolerance   Sitting Edge of Bed 15   Standing 5x2   Comments sitting/commode 5+   Education Group   Education Provided Hip Precautions;Transfers;Role of Occupational Therapist;Home Safety;Activities of Daily Living;Adaptive Equipment;Weight Bearing Precautions   Role of Occupational Therapist Patient Response Patient;Acceptance;Explanation;Verbal Demonstration   Hip Precautions Patient Response Patient;Acceptance;Explanation;Demonstration;Verbal Demonstration;Action Demonstration   Home Safety Patient Response Patient;Acceptance;Explanation;Verbal Demonstration   Transfers Patient Response Patient;Acceptance;Explanation;Demonstration;Verbal Demonstration;Action Demonstration   ADL Patient Response Patient;Acceptance;Explanation;Demonstration;Verbal Demonstration;Action Demonstration   Adaptive Equipment Patient Response Patient;Acceptance;Explanation;Demonstration;Verbal Demonstration;Action Demonstration   Weight Bearing Precautions Patient Response Patient;Acceptance;Explanation;Demonstration;Verbal Demonstration;Action Demonstration   Anticipated Discharge Equipment and Recommendations   DC Equipment Recommendations None   Discharge Recommendations Anticipate that the patient will have no further occupational therapy needs after discharge from the hospital

## 2022-11-02 NOTE — PROGRESS NOTES
Received bedside report from day shift RN at 19:15.   Assumed pt care.   Pt seen AO4, in room air. Verbalized partial blindess, pt is [place] no hearing aids available.  No pain and nausea reported at this time.   POC discussed.   Safety and fall precautions in place.   Instructed pt to use call light, verbalized understanding.   Call light kept within reach.  No other needs at this time.   Will continue to monitor.

## 2022-11-02 NOTE — CARE PLAN
The patient is Stable - Low risk of patient condition declining or worsening    Shift Goals  Clinical Goals: Pt will be able to ambulate and work with PT/OT this shift.  Patient Goals: for discharge, rest comfortably    Progress made toward(s) clinical / shift goals:  Worked with PT/OT. Pt able to ambulate using FWW with assistance. Pain has been control with scheduled pain medication.  Patient is not progressing towards the following goals:      Problem: Fall Risk  Goal: Patient will remain free from falls  Outcome: Progressing     Problem: Post-Operative Hip Replacement  Goal: Patient will demonstrate understanding of post-surgical hip precautions, weight bearing restrictions and DME usage during hospital stay and post discharge  Outcome: Progressing  Goal: Patient's neurovascular status will be maintained or improve  Outcome: Progressing  Goal: Early mobilization post surgery  Outcome: Progressing     Problem: Pain - Post Surgery  Goal: Alleviation or reduction of pain post surgery  Outcome: Progressing     Problem: Incision Care  Goal: Optimal post surgical incision care  Outcome: Progressing     Problem: Venous Thromboembolism (VTE) Prevention  Goal: The patient will remain free from venous thromboembolism (VTE)  Outcome: Progressing     Problem: Pain - Standard  Goal: Alleviation of pain or a reduction in pain to the patient’s comfort goal  Outcome: Progressing     Problem: Skin Integrity  Goal: Skin integrity is maintained or improved  Outcome: Progressing     Problem: Discharge Barriers/Planning  Goal: Patient's continuum of care needs are met  Outcome: Progressing  Flowsheets (Taken 11/2/2022 1632)  Continuum of Care Needs:   Assessed for discharge barriers   Communicated discharge barriers to interdisciplinary tream   Involved patient/family/support system in discharge process   Collaborated with Case Management/   Provided and explained discharge instructions

## 2022-11-02 NOTE — CARE PLAN
The patient is Stable - Low risk of patient condition declining or worsening    Shift Goals  Clinical Goals: Pt will be able to ambulate 50ft before midnight, coid, use IS  Patient Goals: perform selfcare activities, rest comfortably    Progress made toward(s) clinical / shift goals:  Pt has been able to ambulate over 50ft before midnight. Pt was able to void and US IS effectively. This nurse assisted pt in performing self care activities. Pt has been seen resting comfortably this shift    Patient is not progressing towards the following goals:N/a

## 2022-11-02 NOTE — CARE PLAN
The patient is Stable - Low risk of patient condition declining or worsening    Shift Goals  Clinical Goals: Pt will ambulate >50 feet and void 6 hours after surgery  Patient Goals: adequate pain control, rest comfortably    Progress made toward(s) clinical / shift goals:  Pt tried to ambulate   but unsteady feet. Can sit on edge of the bed and stand using FWW. Educated for ambulation. Pain has been control this shift. Able to void.   Patient is not progressing towards the following goals:      Problem: Post-Operative Hip Replacement  Goal: Patient will demonstrate understanding of post-surgical hip precautions, weight bearing restrictions and DME usage during hospital stay and post discharge  Outcome: Progressing  Goal: Patient's neurovascular status will be maintained or improve  Outcome: Progressing  Goal: Early mobilization post surgery  Outcome: Progressing     Problem: Pain - Post Surgery  Goal: Alleviation or reduction of pain post surgery  Outcome: Progressing     Problem: Incision Care  Goal: Optimal post surgical incision care  Outcome: Progressing     Problem: Venous Thromboembolism (VTE) Prevention  Goal: The patient will remain free from venous thromboembolism (VTE)  Outcome: Progressing

## 2022-11-02 NOTE — THERAPY
Physical Therapy   Initial Evaluation     Patient Name: Moshe Guerrero  Age:  87 y.o., Sex:  male  Medical Record #: [id number]  Today's Date: 11/2/2022     Precautions  Precautions: (P) Anterior Hip Precautions;Weight Bearing As Tolerated Left Lower Extremity    Assessment  Patient is 87 y.o. male with a diagnosis of R THR ant Pt lives at home alone and is active.He has a Fx R lat clavicle from fall 4 weeks ago.Pt is safe with bed mob,transfers and ambulation.He understands HEP and precautions he has no equipment needs      Plan    Recommend Physical Therapy for Evaluation only     DC Equipment Recommendations: (P) None  Discharge Recommendations: (P) Recommend outpatient physical therapy services to address higher level deficits        Objective       11/02/22 1100   Charge Group   PT Evaluation PT Evaluation Mod   Total Time Spent   PT Total Time Yes   PT Evaluation Time Spent (Mins) 30   Initial Contact Note    Initial Contact Note Order Received and Verified, Physical Therapy Evaluation in Progress with Full Report to Follow.   Precautions   Precautions Anterior Hip Precautions;Weight Bearing As Tolerated Left Lower Extremity   Prior Living Situation   Prior Services None   Housing / Facility 1 Story House   Steps Into Home 0   Steps In Home 0   Equipment Owned Front-Wheel Walker   Lives with - Patient's Self Care Capacity Alone and Able to Care For Self   Prior Level of Functional Mobility   Bed Mobility Independent   Transfer Status Independent   Ambulation Independent   Distance Ambulation (Feet)   (limited community)   Assistive Devices Used None   History of Falls   History of Falls Yes   Cognition    Cognition / Consciousness WDL   Passive ROM Lower Body   Passive ROM Lower Body X   Active ROM Lower Body    Active ROM Lower Body  X   Strength Lower Body   Lower Body Strength  X   Coordination Lower Body    Coordination Lower Body  WDL   Balance Assessment   Sitting Balance (Static) Good   Sitting Balance  (Dynamic) Good   Standing Balance (Static) Fair +   Standing Balance (Dynamic) Fair +   Weight Shift Sitting Good   Weight Shift Standing Good   Gait Analysis   Gait Level Of Assist Supervised   Assistive Device Front Wheel Walker   Distance (Feet) 100   # of Times Distance was Traveled 1   Deviation Step To   # of Stairs Climbed 0   Weight Bearing Status wbat R   Bed Mobility    Supine to Sit Modified Independent   Sit to Supine Modified Independent   Scooting Modified Independent   Functional Mobility   Sit to Stand Supervised   Bed, Chair, Wheelchair Transfer Supervised   Transfer Method Stand Step   How much difficulty does the patient currently have...   Turning over in bed (including adjusting bedclothes, sheets and blankets)? 4   Sitting down on and standing up from a chair with arms (e.g., wheelchair, bedside commode, etc.) 4   Moving from lying on back to sitting on the side of the bed? 4   How much help from another person does the patient currently need...   Moving to and from a bed to a chair (including a wheelchair)? 4   Need to walk in a hospital room? 4   Climbing 3-5 steps with a railing? 4   6 clicks Mobility Score 24   Activity Tolerance   Sitting Edge of Bed 10   Standing 10   Patient / Family Goals    Patient / Family Goal #1 Home   Anticipated Discharge Equipment and Recommendations   DC Equipment Recommendations None   Discharge Recommendations Recommend outpatient physical therapy services to address higher level deficits   Interdisciplinary Plan of Care Collaboration   IDT Collaboration with  Nursing   Session Information   Date / Session Number  11/2   Priority 0

## 2022-11-02 NOTE — DISCHARGE SUMMARY
Patient was admitted for a total hip arthroplasty.  There were no complications during the surgery. Did well post-operatively.         Recent Labs     11/02/22  0209   HEMOGLOBIN 10.0*   HEMATOCRIT 31.2*       Active Hospital Problems    Diagnosis     Primary osteoarthritis of one hip, right [M16.11]     Osteoarthritis of hip [M16.9]      Added automatically from request for surgery [id number]         Uneventful hospital course.     Medication List        CHANGE how you take these medications        Instructions   acetaminophen 500 MG Tabs  What changed: Another medication with the same name was removed. Continue taking this medication, and follow the directions you see here.  Commonly known as: TYLENOL   Take 2 Tablets by mouth 3 times a day as needed for Mild Pain or Moderate Pain for up to 30 days.  Dose: 1,000 mg     aspirin 81 MG Chew chewable tablet  What changed: Another medication with the same name was removed. Continue taking this medication, and follow the directions you see here.  Commonly known as: ASA   Chew 1 Tablet 2 times a day for 28 days.  Dose: 81 mg            CONTINUE taking these medications        Instructions   CENTRUM ADULTS PO   Take  by mouth.     D3-50 PO   Take  by mouth.     meloxicam 7.5 MG Tabs  Commonly known as: MOBIC   Take 1 Tablet by mouth every day for 60 days.  Dose: 7.5 mg     omeprazole 20 MG delayed-release capsule  Commonly known as: PRILOSEC   Take 20 mg by mouth every day.  Dose: 20 mg     oxyCODONE immediate-release 5 MG Tabs  Commonly known as: ROXICODONE   Take 1-2 Tablets by mouth every four hours as needed for Severe Pain for up to 7 days.  Dose: 5-10 mg     simvastatin 80 MG tablet  Commonly known as: ZOCOR   TAKE  ONE TABLET BY MOUTH NIGHTLY AT BEDTIME     SUPER B COMPLEX/C PO   Take  by mouth.     traMADol 50 MG Tabs  Commonly known as: Ultram   Take 1-2 Tablets by mouth every 6 hours as needed for Moderate Pain for up to 7 days.  Dose:  mg              See DC  instructions

## 2022-11-02 NOTE — PROGRESS NOTES
Discharging patient home per MD order. Pt and niece demonstrated understanding of discharge instructions, follow up appointments, home medications, prescriptions, and home care for surgical wound. Pt is voiding without difficulty, pain well controlled, tolerating oral medications, O2 greater than 90%.Pt and niece verbalized understanding of discharge instructions and educational handouts and all questions answered. PIV removed. Pt discharged off unit with hospital escort.

## 2022-11-04 ENCOUNTER — PATIENT MESSAGE (OUTPATIENT)
Dept: HEALTH INFORMATION MANAGEMENT | Facility: OTHER | Age: 87
End: 2022-11-04

## 2023-01-01 ENCOUNTER — PATIENT OUTREACH (OUTPATIENT)
Dept: HEALTH INFORMATION MANAGEMENT | Facility: OTHER | Age: 88
End: 2023-01-01

## 2023-01-01 ENCOUNTER — OFFICE VISIT (OUTPATIENT)
Dept: MEDICAL GROUP | Facility: PHYSICIAN GROUP | Age: 88
End: 2023-01-01
Payer: MEDICARE

## 2023-01-01 ENCOUNTER — PATIENT OUTREACH (OUTPATIENT)
Dept: SCHEDULING | Facility: IMAGING CENTER | Age: 88
End: 2023-01-01
Payer: MEDICARE

## 2023-01-01 ENCOUNTER — TELEPHONE (OUTPATIENT)
Dept: MEDICAL GROUP | Facility: PHYSICIAN GROUP | Age: 88
End: 2023-01-01
Payer: MEDICARE

## 2023-01-01 ENCOUNTER — APPOINTMENT (OUTPATIENT)
Dept: RADIOLOGY | Facility: MEDICAL CENTER | Age: 88
DRG: 564 | End: 2023-01-01
Attending: HOSPITALIST
Payer: MEDICARE

## 2023-01-01 ENCOUNTER — PATIENT MESSAGE (OUTPATIENT)
Dept: MEDICAL GROUP | Facility: PHYSICIAN GROUP | Age: 88
End: 2023-01-01
Payer: MEDICARE

## 2023-01-01 ENCOUNTER — HOSPITAL ENCOUNTER (OUTPATIENT)
Dept: LAB | Facility: MEDICAL CENTER | Age: 88
End: 2023-04-03
Attending: NURSE PRACTITIONER
Payer: MEDICARE

## 2023-01-01 ENCOUNTER — HOSPITAL ENCOUNTER (OUTPATIENT)
Facility: MEDICAL CENTER | Age: 88
End: 2023-09-25
Attending: INTERNAL MEDICINE
Payer: COMMERCIAL

## 2023-01-01 ENCOUNTER — DOCUMENTATION (OUTPATIENT)
Dept: VASCULAR LAB | Facility: MEDICAL CENTER | Age: 88
End: 2023-01-01
Payer: MEDICARE

## 2023-01-01 ENCOUNTER — OFFICE VISIT (OUTPATIENT)
Dept: WOUND CARE | Facility: MEDICAL CENTER | Age: 88
End: 2023-01-01
Payer: MEDICARE

## 2023-01-01 ENCOUNTER — HOSPITAL ENCOUNTER (OUTPATIENT)
Dept: LAB | Facility: MEDICAL CENTER | Age: 88
End: 2023-06-15
Attending: UROLOGY
Payer: MEDICARE

## 2023-01-01 ENCOUNTER — HOSPICE ADMISSION (OUTPATIENT)
Dept: HOSPICE | Facility: HOSPICE | Age: 88
End: 2023-01-01
Payer: MEDICARE

## 2023-01-01 ENCOUNTER — APPOINTMENT (OUTPATIENT)
Dept: WOUND CARE | Facility: MEDICAL CENTER | Age: 88
End: 2023-01-01
Payer: MEDICARE

## 2023-01-01 ENCOUNTER — APPOINTMENT (OUTPATIENT)
Dept: RADIOLOGY | Facility: MEDICAL CENTER | Age: 88
DRG: 564 | End: 2023-01-01
Attending: EMERGENCY MEDICINE
Payer: MEDICARE

## 2023-01-01 ENCOUNTER — HOSPITAL ENCOUNTER (OUTPATIENT)
Facility: MEDICAL CENTER | Age: 88
End: 2023-11-16
Attending: STUDENT IN AN ORGANIZED HEALTH CARE EDUCATION/TRAINING PROGRAM
Payer: MEDICARE

## 2023-01-01 ENCOUNTER — HOSPITAL ENCOUNTER (INPATIENT)
Facility: MEDICAL CENTER | Age: 88
LOS: 5 days | DRG: 564 | End: 2023-08-30
Attending: EMERGENCY MEDICINE | Admitting: STUDENT IN AN ORGANIZED HEALTH CARE EDUCATION/TRAINING PROGRAM
Payer: MEDICARE

## 2023-01-01 ENCOUNTER — APPOINTMENT (OUTPATIENT)
Dept: RADIOLOGY | Facility: MEDICAL CENTER | Age: 88
DRG: 564 | End: 2023-01-01
Attending: INTERNAL MEDICINE
Payer: MEDICARE

## 2023-01-01 ENCOUNTER — HOSPITAL ENCOUNTER (OUTPATIENT)
Facility: MEDICAL CENTER | Age: 88
End: 2023-11-13
Attending: NURSE PRACTITIONER
Payer: MEDICARE

## 2023-01-01 ENCOUNTER — TELEMEDICINE (OUTPATIENT)
Dept: VASCULAR LAB | Facility: MEDICAL CENTER | Age: 88
End: 2023-01-01
Attending: FAMILY MEDICINE
Payer: MEDICARE

## 2023-01-01 ENCOUNTER — PATIENT OUTREACH (OUTPATIENT)
Dept: HEALTH INFORMATION MANAGEMENT | Facility: OTHER | Age: 88
End: 2023-01-01
Payer: MEDICARE

## 2023-01-01 ENCOUNTER — HOSPITAL ENCOUNTER (OUTPATIENT)
Dept: LAB | Facility: MEDICAL CENTER | Age: 88
End: 2023-11-07
Attending: STUDENT IN AN ORGANIZED HEALTH CARE EDUCATION/TRAINING PROGRAM
Payer: MEDICARE

## 2023-01-01 ENCOUNTER — APPOINTMENT (OUTPATIENT)
Dept: CARDIOLOGY | Facility: MEDICAL CENTER | Age: 88
DRG: 564 | End: 2023-01-01
Attending: STUDENT IN AN ORGANIZED HEALTH CARE EDUCATION/TRAINING PROGRAM
Payer: MEDICARE

## 2023-01-01 ENCOUNTER — HOSPITAL ENCOUNTER (OUTPATIENT)
Facility: MEDICAL CENTER | Age: 88
End: 2023-12-04
Attending: FAMILY MEDICINE
Payer: MEDICARE

## 2023-01-01 ENCOUNTER — APPOINTMENT (OUTPATIENT)
Dept: CARDIOLOGY | Facility: MEDICAL CENTER | Age: 88
DRG: 564 | End: 2023-01-01
Attending: HOSPITALIST
Payer: MEDICARE

## 2023-01-01 ENCOUNTER — TELEPHONE (OUTPATIENT)
Dept: HEALTH INFORMATION MANAGEMENT | Facility: OTHER | Age: 88
End: 2023-01-01
Payer: MEDICARE

## 2023-01-01 ENCOUNTER — TELEMEDICINE (OUTPATIENT)
Dept: MEDICAL GROUP | Facility: PHYSICIAN GROUP | Age: 88
End: 2023-01-01
Payer: MEDICARE

## 2023-01-01 ENCOUNTER — TELEPHONE (OUTPATIENT)
Dept: VASCULAR LAB | Facility: MEDICAL CENTER | Age: 88
End: 2023-01-01
Payer: MEDICARE

## 2023-01-01 ENCOUNTER — OFF SITE VISIT (OUTPATIENT)
Dept: PALLIATIVE MEDICINE | Facility: HOSPICE | Age: 88
End: 2023-01-01
Payer: MEDICARE

## 2023-01-01 ENCOUNTER — HOME CARE VISIT (OUTPATIENT)
Dept: HOSPICE | Facility: HOSPICE | Age: 88
End: 2023-01-01
Payer: MEDICARE

## 2023-01-01 ENCOUNTER — PATIENT MESSAGE (OUTPATIENT)
Dept: HEALTH INFORMATION MANAGEMENT | Facility: OTHER | Age: 88
End: 2023-01-01

## 2023-01-01 VITALS
TEMPERATURE: 98.1 F | BODY MASS INDEX: 18.66 KG/M2 | SYSTOLIC BLOOD PRESSURE: 100 MMHG | WEIGHT: 140.8 LBS | HEIGHT: 73 IN | OXYGEN SATURATION: 96 % | DIASTOLIC BLOOD PRESSURE: 40 MMHG | HEART RATE: 80 BPM

## 2023-01-01 VITALS
OXYGEN SATURATION: 98 % | DIASTOLIC BLOOD PRESSURE: 62 MMHG | HEIGHT: 73 IN | SYSTOLIC BLOOD PRESSURE: 120 MMHG | WEIGHT: 139.2 LBS | HEART RATE: 81 BPM | BODY MASS INDEX: 18.45 KG/M2 | TEMPERATURE: 96.9 F

## 2023-01-01 VITALS
HEART RATE: 74 BPM | SYSTOLIC BLOOD PRESSURE: 110 MMHG | TEMPERATURE: 96.5 F | OXYGEN SATURATION: 99 % | DIASTOLIC BLOOD PRESSURE: 71 MMHG | RESPIRATION RATE: 16 BRPM

## 2023-01-01 VITALS
HEART RATE: 67 BPM | BODY MASS INDEX: 20.72 KG/M2 | RESPIRATION RATE: 17 BRPM | WEIGHT: 156.31 LBS | HEIGHT: 73 IN | TEMPERATURE: 98.1 F | OXYGEN SATURATION: 96 % | SYSTOLIC BLOOD PRESSURE: 140 MMHG | DIASTOLIC BLOOD PRESSURE: 70 MMHG

## 2023-01-01 VITALS
HEART RATE: 74 BPM | DIASTOLIC BLOOD PRESSURE: 53 MMHG | RESPIRATION RATE: 16 BRPM | SYSTOLIC BLOOD PRESSURE: 119 MMHG | OXYGEN SATURATION: 95 % | TEMPERATURE: 97.6 F

## 2023-01-01 VITALS
SYSTOLIC BLOOD PRESSURE: 108 MMHG | TEMPERATURE: 97.8 F | HEIGHT: 73 IN | OXYGEN SATURATION: 95 % | HEART RATE: 68 BPM | WEIGHT: 142.2 LBS | BODY MASS INDEX: 18.85 KG/M2 | DIASTOLIC BLOOD PRESSURE: 58 MMHG

## 2023-01-01 VITALS
HEART RATE: 76 BPM | OXYGEN SATURATION: 100 % | DIASTOLIC BLOOD PRESSURE: 53 MMHG | TEMPERATURE: 96.5 F | SYSTOLIC BLOOD PRESSURE: 101 MMHG | RESPIRATION RATE: 18 BRPM

## 2023-01-01 VITALS
OXYGEN SATURATION: 98 % | TEMPERATURE: 98 F | SYSTOLIC BLOOD PRESSURE: 124 MMHG | HEART RATE: 80 BPM | RESPIRATION RATE: 18 BRPM | DIASTOLIC BLOOD PRESSURE: 53 MMHG

## 2023-01-01 VITALS — WEIGHT: 139 LBS | HEIGHT: 73 IN | RESPIRATION RATE: 16 BRPM | BODY MASS INDEX: 18.42 KG/M2

## 2023-01-01 DIAGNOSIS — R30.0 DYSURIA: ICD-10-CM

## 2023-01-01 DIAGNOSIS — S11.90XD OPEN WOUND OF NECK, SUBSEQUENT ENCOUNTER: ICD-10-CM

## 2023-01-01 DIAGNOSIS — E43 UNSPECIFIED SEVERE PROTEIN-CALORIE MALNUTRITION (HCC): ICD-10-CM

## 2023-01-01 DIAGNOSIS — Z96.641 S/P HIP REPLACEMENT, RIGHT: ICD-10-CM

## 2023-01-01 DIAGNOSIS — H61.23 BILATERAL IMPACTED CERUMEN: ICD-10-CM

## 2023-01-01 DIAGNOSIS — C61 PROSTATE CANCER (HCC): ICD-10-CM

## 2023-01-01 DIAGNOSIS — D72.829 LEUKOCYTOSIS, UNSPECIFIED TYPE: ICD-10-CM

## 2023-01-01 DIAGNOSIS — I44.7 LBBB (LEFT BUNDLE BRANCH BLOCK): ICD-10-CM

## 2023-01-01 DIAGNOSIS — G89.29 CHRONIC PAIN OF RIGHT KNEE: ICD-10-CM

## 2023-01-01 DIAGNOSIS — R97.20 ELEVATED PSA MEASUREMENT: ICD-10-CM

## 2023-01-01 DIAGNOSIS — T79.6XXA TRAUMATIC RHABDOMYOLYSIS, INITIAL ENCOUNTER (HCC): ICD-10-CM

## 2023-01-01 DIAGNOSIS — D50.9 IRON DEFICIENCY ANEMIA, UNSPECIFIED IRON DEFICIENCY ANEMIA TYPE: ICD-10-CM

## 2023-01-01 DIAGNOSIS — Z91.81 PERSONAL HISTORY OF FALL: ICD-10-CM

## 2023-01-01 DIAGNOSIS — S09.93XA FACIAL INJURY, INITIAL ENCOUNTER: ICD-10-CM

## 2023-01-01 DIAGNOSIS — I26.99 OTHER ACUTE PULMONARY EMBOLISM WITHOUT ACUTE COR PULMONALE (HCC): ICD-10-CM

## 2023-01-01 DIAGNOSIS — E78.5 HYPERLIPIDEMIA, UNSPECIFIED HYPERLIPIDEMIA TYPE: ICD-10-CM

## 2023-01-01 DIAGNOSIS — R35.0 URINARY FREQUENCY: ICD-10-CM

## 2023-01-01 DIAGNOSIS — M25.561 CHRONIC PAIN OF RIGHT KNEE: ICD-10-CM

## 2023-01-01 DIAGNOSIS — Z86.711 PERSONAL HISTORY OF PULMONARY EMBOLISM: ICD-10-CM

## 2023-01-01 DIAGNOSIS — R97.20 ELEVATED PSA, GREATER THAN OR EQUAL TO 20 NG/ML: ICD-10-CM

## 2023-01-01 DIAGNOSIS — R26.2 DIFFICULTY IN WALKING, NOT ELSEWHERE CLASSIFIED: ICD-10-CM

## 2023-01-01 DIAGNOSIS — R54 AGE-RELATED PHYSICAL DEBILITY: ICD-10-CM

## 2023-01-01 DIAGNOSIS — M16.11 PRIMARY OSTEOARTHRITIS OF RIGHT HIP: ICD-10-CM

## 2023-01-01 DIAGNOSIS — E78.2 MIXED HYPERLIPIDEMIA: ICD-10-CM

## 2023-01-01 DIAGNOSIS — H92.02 EAR PAIN, LEFT: ICD-10-CM

## 2023-01-01 DIAGNOSIS — L89.214 PRESSURE INJURY OF RIGHT HIP, STAGE 4 (HCC): ICD-10-CM

## 2023-01-01 DIAGNOSIS — R26.89 BALANCE PROBLEM: ICD-10-CM

## 2023-01-01 DIAGNOSIS — L89.214 PRESSURE INJURY OF RIGHT HIP, STAGE 4 (HCC): Primary | ICD-10-CM

## 2023-01-01 DIAGNOSIS — Z00.00 MEDICARE ANNUAL WELLNESS VISIT, SUBSEQUENT: Primary | ICD-10-CM

## 2023-01-01 DIAGNOSIS — Z86.711 HISTORY OF PULMONARY EMBOLUS (PE): Chronic | ICD-10-CM

## 2023-01-01 DIAGNOSIS — I95.1 ORTHOSTATIC HYPOTENSION: ICD-10-CM

## 2023-01-01 DIAGNOSIS — L89.214 PRESSURE ULCER OF RIGHT HIP, STAGE 4 (HCC): ICD-10-CM

## 2023-01-01 DIAGNOSIS — Z91.81 RISK FOR FALLS: ICD-10-CM

## 2023-01-01 DIAGNOSIS — E87.1 HYPONATREMIA: ICD-10-CM

## 2023-01-01 DIAGNOSIS — R33.9 URINARY RETENTION: ICD-10-CM

## 2023-01-01 DIAGNOSIS — D50.9 MICROCYTIC HYPOCHROMIC ANEMIA: ICD-10-CM

## 2023-01-01 DIAGNOSIS — I50.22 CHRONIC HFREF (HEART FAILURE WITH REDUCED EJECTION FRACTION) (HCC): ICD-10-CM

## 2023-01-01 DIAGNOSIS — T79.6XXS TRAUMATIC RHABDOMYOLYSIS, SEQUELA: ICD-10-CM

## 2023-01-01 DIAGNOSIS — M79.89 SWELLING OF RIGHT HAND: ICD-10-CM

## 2023-01-01 DIAGNOSIS — I70.0 AORTIC ATHEROSCLEROSIS (HCC): ICD-10-CM

## 2023-01-01 DIAGNOSIS — N30.01 ACUTE CYSTITIS WITH HEMATURIA: ICD-10-CM

## 2023-01-01 DIAGNOSIS — N13.9 OBSTRUCTIVE AND REFLUX UROPATHY, UNSPECIFIED: ICD-10-CM

## 2023-01-01 LAB
ALBUMIN SERPL BCP-MCNC: 2.4 G/DL (ref 3.2–4.9)
ALBUMIN SERPL BCP-MCNC: 2.5 G/DL (ref 3.2–4.9)
ALBUMIN SERPL BCP-MCNC: 2.6 G/DL (ref 3.2–4.9)
ALBUMIN SERPL BCP-MCNC: 2.8 G/DL (ref 3.2–4.9)
ALBUMIN SERPL BCP-MCNC: 3.3 G/DL (ref 3.2–4.9)
ALBUMIN SERPL BCP-MCNC: 3.9 G/DL (ref 3.2–4.9)
ALBUMIN/GLOB SERPL: 0.5 G/DL
ALBUMIN/GLOB SERPL: 0.8 G/DL
ALBUMIN/GLOB SERPL: 0.9 G/DL
ALBUMIN/GLOB SERPL: 0.9 G/DL
ALBUMIN/GLOB SERPL: 1 G/DL
ALBUMIN/GLOB SERPL: 1.1 G/DL
ALP SERPL-CCNC: 56 U/L (ref 30–99)
ALP SERPL-CCNC: 58 U/L (ref 30–99)
ALP SERPL-CCNC: 58 U/L (ref 30–99)
ALP SERPL-CCNC: 68 U/L (ref 30–99)
ALP SERPL-CCNC: 78 U/L (ref 30–99)
ALP SERPL-CCNC: 87 U/L (ref 30–99)
ALT SERPL-CCNC: 11 U/L (ref 2–50)
ALT SERPL-CCNC: 13 U/L (ref 2–50)
ALT SERPL-CCNC: 32 U/L (ref 2–50)
ALT SERPL-CCNC: 33 U/L (ref 2–50)
ALT SERPL-CCNC: 37 U/L (ref 2–50)
ALT SERPL-CCNC: 38 U/L (ref 2–50)
ANION GAP SERPL CALC-SCNC: 10 MMOL/L (ref 7–16)
ANION GAP SERPL CALC-SCNC: 12 MMOL/L (ref 7–16)
ANION GAP SERPL CALC-SCNC: 13 MMOL/L (ref 7–16)
ANION GAP SERPL CALC-SCNC: 14 MMOL/L (ref 7–16)
ANION GAP SERPL CALC-SCNC: 14 MMOL/L (ref 7–16)
ANION GAP SERPL CALC-SCNC: 15 MMOL/L (ref 7–16)
ANION GAP SERPL CALC-SCNC: 16 MMOL/L (ref 7–16)
ANION GAP SERPL CALC-SCNC: 19 MMOL/L (ref 7–16)
ANION GAP SERPL CALC-SCNC: 9 MMOL/L (ref 7–16)
ANION GAP SERPL CALC-SCNC: 9 MMOL/L (ref 7–16)
APPEARANCE UR: ABNORMAL
APPEARANCE UR: CLEAR
APPEARANCE UR: CLEAR
APTT PPP: 25.3 SEC (ref 24.7–36)
APTT PPP: 28 SEC (ref 24.7–36)
AST SERPL-CCNC: 13 U/L (ref 12–45)
AST SERPL-CCNC: 17 U/L (ref 12–45)
AST SERPL-CCNC: 48 U/L (ref 12–45)
AST SERPL-CCNC: 60 U/L (ref 12–45)
AST SERPL-CCNC: 71 U/L (ref 12–45)
AST SERPL-CCNC: 90 U/L (ref 12–45)
BACTERIA #/AREA URNS HPF: ABNORMAL /HPF
BACTERIA #/AREA URNS HPF: ABNORMAL /HPF
BACTERIA #/AREA URNS HPF: NEGATIVE /HPF
BACTERIA BLD CULT: NORMAL
BACTERIA UR CULT: ABNORMAL
BACTERIA UR CULT: NORMAL
BASOPHILS # BLD AUTO: 0 % (ref 0–1.8)
BASOPHILS # BLD AUTO: 0 % (ref 0–1.8)
BASOPHILS # BLD AUTO: 0.1 % (ref 0–1.8)
BASOPHILS # BLD AUTO: 0.1 % (ref 0–1.8)
BASOPHILS # BLD AUTO: 0.2 % (ref 0–1.8)
BASOPHILS # BLD AUTO: 0.2 % (ref 0–1.8)
BASOPHILS # BLD: 0 K/UL (ref 0–0.12)
BASOPHILS # BLD: 0 K/UL (ref 0–0.12)
BASOPHILS # BLD: 0.01 K/UL (ref 0–0.12)
BASOPHILS # BLD: 0.01 K/UL (ref 0–0.12)
BASOPHILS # BLD: 0.02 K/UL (ref 0–0.12)
BASOPHILS # BLD: 0.02 K/UL (ref 0–0.12)
BILIRUB SERPL-MCNC: 0.5 MG/DL (ref 0.1–1.5)
BILIRUB SERPL-MCNC: 0.6 MG/DL (ref 0.1–1.5)
BILIRUB UR QL STRIP.AUTO: ABNORMAL
BILIRUB UR QL STRIP.AUTO: NEGATIVE
BILIRUB UR QL STRIP.AUTO: NEGATIVE
BUN SERPL-MCNC: 17 MG/DL (ref 8–22)
BUN SERPL-MCNC: 18 MG/DL (ref 8–22)
BUN SERPL-MCNC: 20 MG/DL (ref 8–22)
BUN SERPL-MCNC: 25 MG/DL (ref 8–22)
BUN SERPL-MCNC: 27 MG/DL (ref 8–22)
BUN SERPL-MCNC: 29 MG/DL (ref 8–22)
BUN SERPL-MCNC: 31 MG/DL (ref 8–22)
BUN SERPL-MCNC: 31 MG/DL (ref 8–22)
BUN SERPL-MCNC: 40 MG/DL (ref 8–22)
BUN SERPL-MCNC: 9 MG/DL (ref 8–22)
CALCIUM ALBUM COR SERPL-MCNC: 8.6 MG/DL (ref 8.5–10.5)
CALCIUM ALBUM COR SERPL-MCNC: 8.8 MG/DL (ref 8.5–10.5)
CALCIUM ALBUM COR SERPL-MCNC: 9.3 MG/DL (ref 8.5–10.5)
CALCIUM ALBUM COR SERPL-MCNC: 9.3 MG/DL (ref 8.5–10.5)
CALCIUM ALBUM COR SERPL-MCNC: 9.5 MG/DL (ref 8.5–10.5)
CALCIUM ALBUM COR SERPL-MCNC: 9.8 MG/DL (ref 8.5–10.5)
CALCIUM SERPL-MCNC: 7.1 MG/DL (ref 8.5–10.5)
CALCIUM SERPL-MCNC: 7.3 MG/DL (ref 8.5–10.5)
CALCIUM SERPL-MCNC: 7.6 MG/DL (ref 8.5–10.5)
CALCIUM SERPL-MCNC: 7.8 MG/DL (ref 8.5–10.5)
CALCIUM SERPL-MCNC: 8.2 MG/DL (ref 8.5–10.5)
CALCIUM SERPL-MCNC: 8.2 MG/DL (ref 8.5–10.5)
CALCIUM SERPL-MCNC: 8.3 MG/DL (ref 8.5–10.5)
CALCIUM SERPL-MCNC: 8.5 MG/DL (ref 8.5–10.5)
CALCIUM SERPL-MCNC: 8.7 MG/DL (ref 8.5–10.5)
CALCIUM SERPL-MCNC: 9.7 MG/DL (ref 8.5–10.5)
CHLORIDE SERPL-SCNC: 100 MMOL/L (ref 96–112)
CHLORIDE SERPL-SCNC: 106 MMOL/L (ref 96–112)
CHLORIDE SERPL-SCNC: 106 MMOL/L (ref 96–112)
CHLORIDE SERPL-SCNC: 109 MMOL/L (ref 96–112)
CHLORIDE SERPL-SCNC: 110 MMOL/L (ref 96–112)
CHLORIDE SERPL-SCNC: 111 MMOL/L (ref 96–112)
CHLORIDE SERPL-SCNC: 114 MMOL/L (ref 96–112)
CHLORIDE SERPL-SCNC: 114 MMOL/L (ref 96–112)
CHLORIDE SERPL-SCNC: 115 MMOL/L (ref 96–112)
CHLORIDE SERPL-SCNC: 99 MMOL/L (ref 96–112)
CHOLEST SERPL-MCNC: 106 MG/DL (ref 100–199)
CHOLEST SERPL-MCNC: 158 MG/DL (ref 100–199)
CK SERPL-CCNC: 382 U/L (ref 0–154)
CK SERPL-CCNC: 712 U/L (ref 0–154)
CK SERPL-CCNC: >2000 U/L (ref 0–154)
CO2 SERPL-SCNC: 18 MMOL/L (ref 20–33)
CO2 SERPL-SCNC: 20 MMOL/L (ref 20–33)
CO2 SERPL-SCNC: 23 MMOL/L (ref 20–33)
CO2 SERPL-SCNC: 24 MMOL/L (ref 20–33)
CO2 SERPL-SCNC: 24 MMOL/L (ref 20–33)
COLOR UR: ABNORMAL
COLOR UR: YELLOW
COLOR UR: YELLOW
CREAT SERPL-MCNC: 0.44 MG/DL (ref 0.5–1.4)
CREAT SERPL-MCNC: 0.46 MG/DL (ref 0.5–1.4)
CREAT SERPL-MCNC: 0.47 MG/DL (ref 0.5–1.4)
CREAT SERPL-MCNC: 0.51 MG/DL (ref 0.5–1.4)
CREAT SERPL-MCNC: 0.54 MG/DL (ref 0.5–1.4)
CREAT SERPL-MCNC: 0.54 MG/DL (ref 0.5–1.4)
CREAT SERPL-MCNC: 0.63 MG/DL (ref 0.5–1.4)
CREAT SERPL-MCNC: 0.63 MG/DL (ref 0.5–1.4)
CREAT SERPL-MCNC: 0.69 MG/DL (ref 0.5–1.4)
CREAT SERPL-MCNC: 1 MG/DL (ref 0.5–1.4)
D DIMER PPP IA.FEU-MCNC: 2.69 UG/ML (FEU) (ref 0–0.5)
EKG IMPRESSION: NORMAL
EKG IMPRESSION: NORMAL
EOSINOPHIL # BLD AUTO: 0 K/UL (ref 0–0.51)
EOSINOPHIL # BLD AUTO: 0.01 K/UL (ref 0–0.51)
EOSINOPHIL # BLD AUTO: 0.01 K/UL (ref 0–0.51)
EOSINOPHIL # BLD AUTO: 0.03 K/UL (ref 0–0.51)
EOSINOPHIL # BLD AUTO: 0.03 K/UL (ref 0–0.51)
EOSINOPHIL # BLD AUTO: 0.08 K/UL (ref 0–0.51)
EOSINOPHIL NFR BLD: 0 % (ref 0–6.9)
EOSINOPHIL NFR BLD: 0.2 % (ref 0–6.9)
EOSINOPHIL NFR BLD: 0.2 % (ref 0–6.9)
EOSINOPHIL NFR BLD: 0.3 % (ref 0–6.9)
EOSINOPHIL NFR BLD: 0.4 % (ref 0–6.9)
EOSINOPHIL NFR BLD: 0.9 % (ref 0–6.9)
EPI CELLS #/AREA URNS HPF: NEGATIVE /HPF
ERYTHROCYTE [DISTWIDTH] IN BLOOD BY AUTOMATED COUNT: 45 FL (ref 35.9–50)
ERYTHROCYTE [DISTWIDTH] IN BLOOD BY AUTOMATED COUNT: 47.9 FL (ref 35.9–50)
ERYTHROCYTE [DISTWIDTH] IN BLOOD BY AUTOMATED COUNT: 49.6 FL (ref 35.9–50)
ERYTHROCYTE [DISTWIDTH] IN BLOOD BY AUTOMATED COUNT: 49.8 FL (ref 35.9–50)
ERYTHROCYTE [DISTWIDTH] IN BLOOD BY AUTOMATED COUNT: 50.2 FL (ref 35.9–50)
ERYTHROCYTE [DISTWIDTH] IN BLOOD BY AUTOMATED COUNT: 50.3 FL (ref 35.9–50)
ERYTHROCYTE [DISTWIDTH] IN BLOOD BY AUTOMATED COUNT: 50.9 FL (ref 35.9–50)
ERYTHROCYTE [DISTWIDTH] IN BLOOD BY AUTOMATED COUNT: 54.4 FL (ref 35.9–50)
ERYTHROCYTE [DISTWIDTH] IN BLOOD BY AUTOMATED COUNT: 67.6 FL (ref 35.9–50)
FASTING STATUS PATIENT QL REPORTED: NORMAL
FASTING STATUS PATIENT QL REPORTED: NORMAL
FERRITIN SERPL-MCNC: 212 NG/ML (ref 22–322)
GFR SERPLBLD CREATININE-BSD FMLA CKD-EPI: 100 ML/MIN/1.73 M 2
GFR SERPLBLD CREATININE-BSD FMLA CKD-EPI: 101 ML/MIN/1.73 M 2
GFR SERPLBLD CREATININE-BSD FMLA CKD-EPI: 102 ML/MIN/1.73 M 2
GFR SERPLBLD CREATININE-BSD FMLA CKD-EPI: 72 ML/MIN/1.73 M 2
GFR SERPLBLD CREATININE-BSD FMLA CKD-EPI: 89 ML/MIN/1.73 M 2
GFR SERPLBLD CREATININE-BSD FMLA CKD-EPI: 92 ML/MIN/1.73 M 2
GFR SERPLBLD CREATININE-BSD FMLA CKD-EPI: 92 ML/MIN/1.73 M 2
GFR SERPLBLD CREATININE-BSD FMLA CKD-EPI: 96 ML/MIN/1.73 M 2
GFR SERPLBLD CREATININE-BSD FMLA CKD-EPI: 96 ML/MIN/1.73 M 2
GFR SERPLBLD CREATININE-BSD FMLA CKD-EPI: 98 ML/MIN/1.73 M 2
GLOBULIN SER CALC-MCNC: 3 G/DL (ref 1.9–3.5)
GLOBULIN SER CALC-MCNC: 3 G/DL (ref 1.9–3.5)
GLOBULIN SER CALC-MCNC: 3.1 G/DL (ref 1.9–3.5)
GLOBULIN SER CALC-MCNC: 3.3 G/DL (ref 1.9–3.5)
GLOBULIN SER CALC-MCNC: 3.4 G/DL (ref 1.9–3.5)
GLOBULIN SER CALC-MCNC: 4.5 G/DL (ref 1.9–3.5)
GLUCOSE BLD STRIP.AUTO-MCNC: 116 MG/DL (ref 65–99)
GLUCOSE BLD STRIP.AUTO-MCNC: 116 MG/DL (ref 65–99)
GLUCOSE BLD STRIP.AUTO-MCNC: 124 MG/DL (ref 65–99)
GLUCOSE BLD STRIP.AUTO-MCNC: 142 MG/DL (ref 65–99)
GLUCOSE BLD STRIP.AUTO-MCNC: 63 MG/DL (ref 65–99)
GLUCOSE BLD STRIP.AUTO-MCNC: 67 MG/DL (ref 65–99)
GLUCOSE BLD STRIP.AUTO-MCNC: 76 MG/DL (ref 65–99)
GLUCOSE BLD STRIP.AUTO-MCNC: 79 MG/DL (ref 65–99)
GLUCOSE BLD STRIP.AUTO-MCNC: 79 MG/DL (ref 65–99)
GLUCOSE BLD STRIP.AUTO-MCNC: 83 MG/DL (ref 65–99)
GLUCOSE BLD STRIP.AUTO-MCNC: 85 MG/DL (ref 65–99)
GLUCOSE BLD STRIP.AUTO-MCNC: 86 MG/DL (ref 65–99)
GLUCOSE BLD STRIP.AUTO-MCNC: 87 MG/DL (ref 65–99)
GLUCOSE BLD STRIP.AUTO-MCNC: 99 MG/DL (ref 65–99)
GLUCOSE SERPL-MCNC: 104 MG/DL (ref 65–99)
GLUCOSE SERPL-MCNC: 107 MG/DL (ref 65–99)
GLUCOSE SERPL-MCNC: 113 MG/DL (ref 65–99)
GLUCOSE SERPL-MCNC: 120 MG/DL (ref 65–99)
GLUCOSE SERPL-MCNC: 128 MG/DL (ref 65–99)
GLUCOSE SERPL-MCNC: 129 MG/DL (ref 65–99)
GLUCOSE SERPL-MCNC: 145 MG/DL (ref 65–99)
GLUCOSE SERPL-MCNC: 167 MG/DL (ref 65–99)
GLUCOSE SERPL-MCNC: 79 MG/DL (ref 65–99)
GLUCOSE SERPL-MCNC: 86 MG/DL (ref 65–99)
GLUCOSE UR STRIP.AUTO-MCNC: NEGATIVE MG/DL
HCT VFR BLD AUTO: 28.1 % (ref 42–52)
HCT VFR BLD AUTO: 29.2 % (ref 42–52)
HCT VFR BLD AUTO: 30.2 % (ref 42–52)
HCT VFR BLD AUTO: 38.2 % (ref 42–52)
HCT VFR BLD AUTO: 40.6 % (ref 42–52)
HCT VFR BLD AUTO: 40.6 % (ref 42–52)
HCT VFR BLD AUTO: 41.7 % (ref 42–52)
HCT VFR BLD AUTO: 42.2 % (ref 42–52)
HCT VFR BLD AUTO: 44.9 % (ref 42–52)
HDLC SERPL-MCNC: 33 MG/DL
HDLC SERPL-MCNC: 61 MG/DL
HGB BLD-MCNC: 12.4 G/DL (ref 14–18)
HGB BLD-MCNC: 12.4 G/DL (ref 14–18)
HGB BLD-MCNC: 12.7 G/DL (ref 14–18)
HGB BLD-MCNC: 13 G/DL (ref 14–18)
HGB BLD-MCNC: 13.2 G/DL (ref 14–18)
HGB BLD-MCNC: 14.6 G/DL (ref 14–18)
HGB BLD-MCNC: 8.5 G/DL (ref 14–18)
HGB BLD-MCNC: 9.1 G/DL (ref 14–18)
HGB BLD-MCNC: 9.2 G/DL (ref 14–18)
HGB RETIC QN AUTO: 22.3 PG/CELL (ref 29–35)
HYALINE CASTS #/AREA URNS LPF: ABNORMAL /LPF
IMM ASSAY OCC BLD FITOB: NEGATIVE
IMM GRANULOCYTES # BLD AUTO: 0.02 K/UL (ref 0–0.11)
IMM GRANULOCYTES # BLD AUTO: 0.03 K/UL (ref 0–0.11)
IMM GRANULOCYTES # BLD AUTO: 0.04 K/UL (ref 0–0.11)
IMM GRANULOCYTES # BLD AUTO: 0.05 K/UL (ref 0–0.11)
IMM GRANULOCYTES NFR BLD AUTO: 0.4 % (ref 0–0.9)
IMM GRANULOCYTES NFR BLD AUTO: 0.4 % (ref 0–0.9)
IMM GRANULOCYTES NFR BLD AUTO: 0.5 % (ref 0–0.9)
IMM GRANULOCYTES NFR BLD AUTO: 0.7 % (ref 0–0.9)
IMM RETICS NFR: 15.6 % (ref 2.6–16.1)
INR PPP: 1.09 (ref 0.87–1.13)
INR PPP: 1.12 (ref 0.87–1.13)
IRON SATN MFR SERPL: 6 % (ref 15–55)
IRON SERPL-MCNC: 10 UG/DL (ref 50–180)
KETONES UR STRIP.AUTO-MCNC: 40 MG/DL
KETONES UR STRIP.AUTO-MCNC: ABNORMAL MG/DL
KETONES UR STRIP.AUTO-MCNC: NEGATIVE MG/DL
LACTATE SERPL-SCNC: 1.5 MMOL/L (ref 0.5–2)
LDLC SERPL CALC-MCNC: 56 MG/DL
LDLC SERPL CALC-MCNC: 83 MG/DL
LEUKOCYTE ESTERASE UR QL STRIP.AUTO: ABNORMAL
LEUKOCYTE ESTERASE UR QL STRIP.AUTO: ABNORMAL
LEUKOCYTE ESTERASE UR QL STRIP.AUTO: NEGATIVE
LV EJECT FRACT  99904: 30
LYMPHOCYTES # BLD AUTO: 0.66 K/UL (ref 1–4.8)
LYMPHOCYTES # BLD AUTO: 0.78 K/UL (ref 1–4.8)
LYMPHOCYTES # BLD AUTO: 0.78 K/UL (ref 1–4.8)
LYMPHOCYTES # BLD AUTO: 0.83 K/UL (ref 1–4.8)
LYMPHOCYTES # BLD AUTO: 1.15 K/UL (ref 1–4.8)
LYMPHOCYTES # BLD AUTO: 1.23 K/UL (ref 1–4.8)
LYMPHOCYTES NFR BLD: 13.2 % (ref 22–41)
LYMPHOCYTES NFR BLD: 15.1 % (ref 22–41)
LYMPHOCYTES NFR BLD: 15.4 % (ref 22–41)
LYMPHOCYTES NFR BLD: 17 % (ref 22–41)
LYMPHOCYTES NFR BLD: 7.1 % (ref 22–41)
LYMPHOCYTES NFR BLD: 9.9 % (ref 22–41)
MAGNESIUM SERPL-MCNC: 1.3 MG/DL (ref 1.5–2.5)
MAGNESIUM SERPL-MCNC: 1.5 MG/DL (ref 1.5–2.5)
MAGNESIUM SERPL-MCNC: 1.8 MG/DL (ref 1.5–2.5)
MAGNESIUM SERPL-MCNC: 2 MG/DL (ref 1.5–2.5)
MCH RBC QN AUTO: 24.6 PG (ref 27–33)
MCH RBC QN AUTO: 24.9 PG (ref 27–33)
MCH RBC QN AUTO: 25.1 PG (ref 27–33)
MCH RBC QN AUTO: 25.5 PG (ref 27–33)
MCH RBC QN AUTO: 26 PG (ref 27–33)
MCH RBC QN AUTO: 26.2 PG (ref 27–33)
MCH RBC QN AUTO: 26.3 PG (ref 27–33)
MCH RBC QN AUTO: 26.4 PG (ref 27–33)
MCH RBC QN AUTO: 26.6 PG (ref 27–33)
MCHC RBC AUTO-ENTMCNC: 30.2 G/DL (ref 32.3–36.5)
MCHC RBC AUTO-ENTMCNC: 30.5 G/DL (ref 32.3–36.5)
MCHC RBC AUTO-ENTMCNC: 30.5 G/DL (ref 33.7–35.3)
MCHC RBC AUTO-ENTMCNC: 31.2 G/DL (ref 32.3–36.5)
MCHC RBC AUTO-ENTMCNC: 31.2 G/DL (ref 32.3–36.5)
MCHC RBC AUTO-ENTMCNC: 31.3 G/DL (ref 32.3–36.5)
MCHC RBC AUTO-ENTMCNC: 31.3 G/DL (ref 32.3–36.5)
MCHC RBC AUTO-ENTMCNC: 32.5 G/DL (ref 32.3–36.5)
MCHC RBC AUTO-ENTMCNC: 32.5 G/DL (ref 32.3–36.5)
MCV RBC AUTO: 80 FL (ref 81.4–97.8)
MCV RBC AUTO: 80.1 FL (ref 81.4–97.8)
MCV RBC AUTO: 80.7 FL (ref 81.4–97.8)
MCV RBC AUTO: 81.9 FL (ref 81.4–97.8)
MCV RBC AUTO: 82.9 FL (ref 81.4–97.8)
MCV RBC AUTO: 83.4 FL (ref 81.4–97.8)
MCV RBC AUTO: 83.9 FL (ref 81.4–97.8)
MCV RBC AUTO: 84.4 FL (ref 81.4–97.8)
MCV RBC AUTO: 84.4 FL (ref 81.4–97.8)
MICRO URNS: ABNORMAL
MONOCYTES # BLD AUTO: 0.26 K/UL (ref 0–0.85)
MONOCYTES # BLD AUTO: 0.37 K/UL (ref 0–0.85)
MONOCYTES # BLD AUTO: 0.45 K/UL (ref 0–0.85)
MONOCYTES # BLD AUTO: 0.55 K/UL (ref 0–0.85)
MONOCYTES # BLD AUTO: 0.55 K/UL (ref 0–0.85)
MONOCYTES # BLD AUTO: 0.79 K/UL (ref 0–0.85)
MONOCYTES NFR BLD AUTO: 5 % (ref 0–13.4)
MONOCYTES NFR BLD AUTO: 5.2 % (ref 0–13.4)
MONOCYTES NFR BLD AUTO: 5.7 % (ref 0–13.4)
MONOCYTES NFR BLD AUTO: 6.7 % (ref 0–13.4)
MONOCYTES NFR BLD AUTO: 8.2 % (ref 0–13.4)
MONOCYTES NFR BLD AUTO: 9.9 % (ref 0–13.4)
NEUTROPHILS # BLD AUTO: 3.51 K/UL (ref 1.82–7.42)
NEUTROPHILS # BLD AUTO: 4.26 K/UL (ref 1.82–7.42)
NEUTROPHILS # BLD AUTO: 5.43 K/UL (ref 1.82–7.42)
NEUTROPHILS # BLD AUTO: 5.9 K/UL (ref 1.82–7.42)
NEUTROPHILS # BLD AUTO: 6.95 K/UL (ref 1.82–7.42)
NEUTROPHILS # BLD AUTO: 9.63 K/UL (ref 1.82–7.42)
NEUTROPHILS NFR BLD: 73.7 % (ref 44–72)
NEUTROPHILS NFR BLD: 76.7 % (ref 44–72)
NEUTROPHILS NFR BLD: 77.5 % (ref 44–72)
NEUTROPHILS NFR BLD: 80 % (ref 44–72)
NEUTROPHILS NFR BLD: 81.1 % (ref 44–72)
NEUTROPHILS NFR BLD: 87 % (ref 44–72)
NITRITE UR QL STRIP.AUTO: NEGATIVE
NITRITE UR QL STRIP.AUTO: NEGATIVE
NITRITE UR QL STRIP.AUTO: POSITIVE
NRBC # BLD AUTO: 0 K/UL
NRBC BLD-RTO: 0 /100 WBC
NRBC BLD-RTO: 0 /100 WBC (ref 0–0.2)
NT-PROBNP SERPL IA-MCNC: 3571 PG/ML (ref 0–125)
PH UR STRIP.AUTO: 5.5 [PH] (ref 5–8)
PH UR STRIP.AUTO: 6.5 [PH] (ref 5–8)
PH UR STRIP.AUTO: 7 [PH] (ref 5–8)
PHOSPHATE SERPL-MCNC: 1.5 MG/DL (ref 2.5–4.5)
PHOSPHATE SERPL-MCNC: 2 MG/DL (ref 2.5–4.5)
PHOSPHATE SERPL-MCNC: 3.7 MG/DL (ref 2.5–4.5)
PLATELET # BLD AUTO: 131 K/UL (ref 164–446)
PLATELET # BLD AUTO: 140 K/UL (ref 164–446)
PLATELET # BLD AUTO: 141 K/UL (ref 164–446)
PLATELET # BLD AUTO: 150 K/UL (ref 164–446)
PLATELET # BLD AUTO: 187 K/UL (ref 164–446)
PLATELET # BLD AUTO: 214 K/UL (ref 164–446)
PLATELET # BLD AUTO: 325 K/UL (ref 164–446)
PLATELET # BLD AUTO: 337 K/UL (ref 164–446)
PLATELET # BLD AUTO: 339 K/UL (ref 164–446)
PMV BLD AUTO: 10.1 FL (ref 9–12.9)
PMV BLD AUTO: 10.2 FL (ref 9–12.9)
PMV BLD AUTO: 9.2 FL (ref 9–12.9)
PMV BLD AUTO: 9.2 FL (ref 9–12.9)
PMV BLD AUTO: 9.4 FL (ref 9–12.9)
PMV BLD AUTO: 9.7 FL (ref 9–12.9)
PMV BLD AUTO: 9.9 FL (ref 9–12.9)
POTASSIUM SERPL-SCNC: 3.1 MMOL/L (ref 3.6–5.5)
POTASSIUM SERPL-SCNC: 3.3 MMOL/L (ref 3.6–5.5)
POTASSIUM SERPL-SCNC: 3.4 MMOL/L (ref 3.6–5.5)
POTASSIUM SERPL-SCNC: 3.5 MMOL/L (ref 3.6–5.5)
POTASSIUM SERPL-SCNC: 3.6 MMOL/L (ref 3.6–5.5)
POTASSIUM SERPL-SCNC: 3.7 MMOL/L (ref 3.6–5.5)
POTASSIUM SERPL-SCNC: 3.8 MMOL/L (ref 3.6–5.5)
POTASSIUM SERPL-SCNC: 4.3 MMOL/L (ref 3.6–5.5)
PROT SERPL-MCNC: 5.6 G/DL (ref 6–8.2)
PROT SERPL-MCNC: 5.6 G/DL (ref 6–8.2)
PROT SERPL-MCNC: 5.8 G/DL (ref 6–8.2)
PROT SERPL-MCNC: 6.6 G/DL (ref 6–8.2)
PROT SERPL-MCNC: 6.9 G/DL (ref 6–8.2)
PROT SERPL-MCNC: 7.3 G/DL (ref 6–8.2)
PROT UR QL STRIP: 100 MG/DL
PROT UR QL STRIP: 30 MG/DL
PROT UR QL STRIP: >=300 MG/DL
PROTHROMBIN TIME: 14.3 SEC (ref 12–14.6)
PROTHROMBIN TIME: 14.6 SEC (ref 12–14.6)
PSA SERPL-MCNC: 35.1 NG/ML (ref 0–4)
PSA SERPL-MCNC: 37.7 NG/ML (ref 0–4)
RBC # BLD AUTO: 3.39 M/UL (ref 4.7–6.1)
RBC # BLD AUTO: 3.65 M/UL (ref 4.7–6.1)
RBC # BLD AUTO: 3.74 M/UL (ref 4.7–6.1)
RBC # BLD AUTO: 4.77 M/UL (ref 4.7–6.1)
RBC # BLD AUTO: 4.84 M/UL (ref 4.7–6.1)
RBC # BLD AUTO: 4.87 M/UL (ref 4.7–6.1)
RBC # BLD AUTO: 4.94 M/UL (ref 4.7–6.1)
RBC # BLD AUTO: 5 M/UL (ref 4.7–6.1)
RBC # BLD AUTO: 5.48 M/UL (ref 4.7–6.1)
RBC # URNS HPF: ABNORMAL /HPF
RBC UR QL AUTO: ABNORMAL
RETICS # AUTO: 0.04 M/UL (ref 0.04–0.12)
RETICS/RBC NFR: 1.1 % (ref 0.8–2.6)
SIGNIFICANT IND 70042: ABNORMAL
SIGNIFICANT IND 70042: ABNORMAL
SIGNIFICANT IND 70042: NORMAL
SIGNIFICANT IND 70042: NORMAL
SITE SITE: ABNORMAL
SITE SITE: ABNORMAL
SITE SITE: NORMAL
SITE SITE: NORMAL
SODIUM SERPL-SCNC: 133 MMOL/L (ref 135–145)
SODIUM SERPL-SCNC: 137 MMOL/L (ref 135–145)
SODIUM SERPL-SCNC: 138 MMOL/L (ref 135–145)
SODIUM SERPL-SCNC: 140 MMOL/L (ref 135–145)
SODIUM SERPL-SCNC: 143 MMOL/L (ref 135–145)
SODIUM SERPL-SCNC: 146 MMOL/L (ref 135–145)
SODIUM SERPL-SCNC: 146 MMOL/L (ref 135–145)
SODIUM SERPL-SCNC: 148 MMOL/L (ref 135–145)
SODIUM SERPL-SCNC: 149 MMOL/L (ref 135–145)
SODIUM SERPL-SCNC: 149 MMOL/L (ref 135–145)
SODIUM SERPL-SCNC: 150 MMOL/L (ref 135–145)
SOURCE SOURCE: ABNORMAL
SOURCE SOURCE: ABNORMAL
SOURCE SOURCE: NORMAL
SOURCE SOURCE: NORMAL
SP GR UR STRIP.AUTO: 1.01
SP GR UR STRIP.AUTO: 1.01
SP GR UR STRIP.AUTO: 1.02
TIBC SERPL-MCNC: 158 UG/DL (ref 250–450)
TRIGL SERPL-MCNC: 72 MG/DL (ref 0–149)
TRIGL SERPL-MCNC: 87 MG/DL (ref 0–149)
TROPONIN T SERPL-MCNC: 42 NG/L (ref 6–19)
TROPONIN T SERPL-MCNC: 49 NG/L (ref 6–19)
TROPONIN T SERPL-MCNC: 58 NG/L (ref 6–19)
UFH PPP CHRO-ACNC: 0.19 IU/ML
UFH PPP CHRO-ACNC: 0.22 IU/ML
UFH PPP CHRO-ACNC: 0.31 IU/ML
UFH PPP CHRO-ACNC: 0.43 IU/ML
UFH PPP CHRO-ACNC: 0.43 IU/ML
UFH PPP CHRO-ACNC: 0.63 IU/ML
UFH PPP CHRO-ACNC: 0.79 IU/ML
UFH PPP CHRO-ACNC: <0.1 IU/ML
UIBC SERPL-MCNC: 148 UG/DL (ref 110–370)
UROBILINOGEN UR STRIP.AUTO-MCNC: 0.2 MG/DL
UROBILINOGEN UR STRIP.AUTO-MCNC: 1 MG/DL
UROBILINOGEN UR STRIP.AUTO-MCNC: 2 MG/DL
VIT B12 SERPL-MCNC: 648 PG/ML (ref 211–911)
WBC # BLD AUTO: 11.1 K/UL (ref 4.8–10.8)
WBC # BLD AUTO: 4.6 K/UL (ref 4.8–10.8)
WBC # BLD AUTO: 4.6 K/UL (ref 4.8–10.8)
WBC # BLD AUTO: 4.8 K/UL (ref 4.8–10.8)
WBC # BLD AUTO: 5 K/UL (ref 4.8–10.8)
WBC # BLD AUTO: 5.5 K/UL (ref 4.8–10.8)
WBC # BLD AUTO: 6.7 K/UL (ref 4.8–10.8)
WBC # BLD AUTO: 8 K/UL (ref 4.8–10.8)
WBC # BLD AUTO: 8.7 K/UL (ref 4.8–10.8)
WBC #/AREA URNS HPF: ABNORMAL /HPF

## 2023-01-01 PROCEDURE — 3074F SYST BP LT 130 MM HG: CPT | Performed by: NURSE PRACTITIONER

## 2023-01-01 PROCEDURE — 99215 OFFICE O/P EST HI 40 MIN: CPT | Performed by: NURSE PRACTITIONER

## 2023-01-01 PROCEDURE — 80053 COMPREHEN METABOLIC PANEL: CPT

## 2023-01-01 PROCEDURE — 99213 OFFICE O/P EST LOW 20 MIN: CPT | Mod: 25

## 2023-01-01 PROCEDURE — 700102 HCHG RX REV CODE 250 W/ 637 OVERRIDE(OP): Mod: JZ | Performed by: INTERNAL MEDICINE

## 2023-01-01 PROCEDURE — A9270 NON-COVERED ITEM OR SERVICE: HCPCS | Performed by: HOSPITALIST

## 2023-01-01 PROCEDURE — 80048 BASIC METABOLIC PNL TOTAL CA: CPT

## 2023-01-01 PROCEDURE — 83735 ASSAY OF MAGNESIUM: CPT

## 2023-01-01 PROCEDURE — 770020 HCHG ROOM/CARE - TELE (206)

## 2023-01-01 PROCEDURE — 71275 CT ANGIOGRAPHY CHEST: CPT

## 2023-01-01 PROCEDURE — 11043 DBRDMT MUSC&/FSCA 1ST 20/<: CPT

## 2023-01-01 PROCEDURE — 85046 RETICYTE/HGB CONCENTRATE: CPT

## 2023-01-01 PROCEDURE — 700105 HCHG RX REV CODE 258: Performed by: HOSPITALIST

## 2023-01-01 PROCEDURE — 700101 HCHG RX REV CODE 250: Performed by: INTERNAL MEDICINE

## 2023-01-01 PROCEDURE — 85520 HEPARIN ASSAY: CPT

## 2023-01-01 PROCEDURE — A9270 NON-COVERED ITEM OR SERVICE: HCPCS | Performed by: STUDENT IN AN ORGANIZED HEALTH CARE EDUCATION/TRAINING PROGRAM

## 2023-01-01 PROCEDURE — 71045 X-RAY EXAM CHEST 1 VIEW: CPT

## 2023-01-01 PROCEDURE — 85025 COMPLETE CBC W/AUTO DIFF WBC: CPT

## 2023-01-01 PROCEDURE — 81001 URINALYSIS AUTO W/SCOPE: CPT

## 2023-01-01 PROCEDURE — 11042 DBRDMT SUBQ TIS 1ST 20SQCM/<: CPT | Performed by: NURSE PRACTITIONER

## 2023-01-01 PROCEDURE — 85730 THROMBOPLASTIN TIME PARTIAL: CPT

## 2023-01-01 PROCEDURE — 87040 BLOOD CULTURE FOR BACTERIA: CPT

## 2023-01-01 PROCEDURE — 82550 ASSAY OF CK (CPK): CPT

## 2023-01-01 PROCEDURE — 92612 ENDOSCOPY SWALLOW (FEES) VID: CPT

## 2023-01-01 PROCEDURE — 99498 ADVNCD CARE PLAN ADDL 30 MIN: CPT

## 2023-01-01 PROCEDURE — 99350 HOME/RES VST EST HIGH MDM 60: CPT | Mod: 25

## 2023-01-01 PROCEDURE — 36415 COLL VENOUS BLD VENIPUNCTURE: CPT

## 2023-01-01 PROCEDURE — 3078F DIAST BP <80 MM HG: CPT | Performed by: NURSE PRACTITIONER

## 2023-01-01 PROCEDURE — 73030 X-RAY EXAM OF SHOULDER: CPT | Mod: RT

## 2023-01-01 PROCEDURE — 82728 ASSAY OF FERRITIN: CPT

## 2023-01-01 PROCEDURE — 700101 HCHG RX REV CODE 250

## 2023-01-01 PROCEDURE — 85027 COMPLETE CBC AUTOMATED: CPT

## 2023-01-01 PROCEDURE — 700111 HCHG RX REV CODE 636 W/ 250 OVERRIDE (IP): Performed by: HOSPITALIST

## 2023-01-01 PROCEDURE — 84153 ASSAY OF PSA TOTAL: CPT

## 2023-01-01 PROCEDURE — 99213 OFFICE O/P EST LOW 20 MIN: CPT | Mod: 25 | Performed by: NURSE PRACTITIONER

## 2023-01-01 PROCEDURE — 84484 ASSAY OF TROPONIN QUANT: CPT

## 2023-01-01 PROCEDURE — G0439 PPPS, SUBSEQ VISIT: HCPCS | Performed by: NURSE PRACTITIONER

## 2023-01-01 PROCEDURE — 97167 OT EVAL HIGH COMPLEX 60 MIN: CPT

## 2023-01-01 PROCEDURE — 73060 X-RAY EXAM OF HUMERUS: CPT | Mod: RT

## 2023-01-01 PROCEDURE — 700102 HCHG RX REV CODE 250 W/ 637 OVERRIDE(OP): Performed by: STUDENT IN AN ORGANIZED HEALTH CARE EDUCATION/TRAINING PROGRAM

## 2023-01-01 PROCEDURE — 11042 DBRDMT SUBQ TIS 1ST 20SQCM/<: CPT

## 2023-01-01 PROCEDURE — 93010 ELECTROCARDIOGRAM REPORT: CPT | Performed by: INTERNAL MEDICINE

## 2023-01-01 PROCEDURE — 97163 PT EVAL HIGH COMPLEX 45 MIN: CPT

## 2023-01-01 PROCEDURE — 700105 HCHG RX REV CODE 258: Performed by: EMERGENCY MEDICINE

## 2023-01-01 PROCEDURE — 700105 HCHG RX REV CODE 258: Performed by: STUDENT IN AN ORGANIZED HEALTH CARE EDUCATION/TRAINING PROGRAM

## 2023-01-01 PROCEDURE — 84100 ASSAY OF PHOSPHORUS: CPT

## 2023-01-01 PROCEDURE — 770006 HCHG ROOM/CARE - MED/SURG/GYN SEMI*

## 2023-01-01 PROCEDURE — 93306 TTE W/DOPPLER COMPLETE: CPT

## 2023-01-01 PROCEDURE — 99213 OFFICE O/P EST LOW 20 MIN: CPT

## 2023-01-01 PROCEDURE — 82962 GLUCOSE BLOOD TEST: CPT | Mod: 91

## 2023-01-01 PROCEDURE — 82607 VITAMIN B-12: CPT

## 2023-01-01 PROCEDURE — 70486 CT MAXILLOFACIAL W/O DYE: CPT

## 2023-01-01 PROCEDURE — 700101 HCHG RX REV CODE 250: Performed by: HOSPITALIST

## 2023-01-01 PROCEDURE — 99285 EMERGENCY DEPT VISIT HI MDM: CPT

## 2023-01-01 PROCEDURE — 99214 OFFICE O/P EST MOD 30 MIN: CPT | Mod: 95 | Performed by: NURSE PRACTITIONER

## 2023-01-01 PROCEDURE — 99204 OFFICE O/P NEW MOD 45 MIN: CPT | Mod: 95 | Performed by: FAMILY MEDICINE

## 2023-01-01 PROCEDURE — 99233 SBSQ HOSP IP/OBS HIGH 50: CPT | Performed by: STUDENT IN AN ORGANIZED HEALTH CARE EDUCATION/TRAINING PROGRAM

## 2023-01-01 PROCEDURE — 92610 EVALUATE SWALLOWING FUNCTION: CPT

## 2023-01-01 PROCEDURE — 700105 HCHG RX REV CODE 258: Performed by: INTERNAL MEDICINE

## 2023-01-01 PROCEDURE — 93005 ELECTROCARDIOGRAM TRACING: CPT

## 2023-01-01 PROCEDURE — 83550 IRON BINDING TEST: CPT

## 2023-01-01 PROCEDURE — 97597 DBRDMT OPN WND 1ST 20 CM/<: CPT

## 2023-01-01 PROCEDURE — 99233 SBSQ HOSP IP/OBS HIGH 50: CPT | Performed by: INTERNAL MEDICINE

## 2023-01-01 PROCEDURE — 700111 HCHG RX REV CODE 636 W/ 250 OVERRIDE (IP): Performed by: STUDENT IN AN ORGANIZED HEALTH CARE EDUCATION/TRAINING PROGRAM

## 2023-01-01 PROCEDURE — 93005 ELECTROCARDIOGRAM TRACING: CPT | Performed by: HOSPITALIST

## 2023-01-01 PROCEDURE — 83605 ASSAY OF LACTIC ACID: CPT

## 2023-01-01 PROCEDURE — 700102 HCHG RX REV CODE 250 W/ 637 OVERRIDE(OP): Performed by: HOSPITALIST

## 2023-01-01 PROCEDURE — 85379 FIBRIN DEGRADATION QUANT: CPT

## 2023-01-01 PROCEDURE — 73501 X-RAY EXAM HIP UNI 1 VIEW: CPT | Mod: RT

## 2023-01-01 PROCEDURE — 93970 EXTREMITY STUDY: CPT

## 2023-01-01 PROCEDURE — A9270 NON-COVERED ITEM OR SERVICE: HCPCS | Mod: JZ | Performed by: INTERNAL MEDICINE

## 2023-01-01 PROCEDURE — 73130 X-RAY EXAM OF HAND: CPT | Mod: RT

## 2023-01-01 PROCEDURE — 700117 HCHG RX CONTRAST REV CODE 255: Performed by: HOSPITALIST

## 2023-01-01 PROCEDURE — 93306 TTE W/DOPPLER COMPLETE: CPT | Mod: 26 | Performed by: INTERNAL MEDICINE

## 2023-01-01 PROCEDURE — 99214 OFFICE O/P EST MOD 30 MIN: CPT | Mod: 25

## 2023-01-01 PROCEDURE — 93005 ELECTROCARDIOGRAM TRACING: CPT | Performed by: EMERGENCY MEDICINE

## 2023-01-01 PROCEDURE — 99497 ADVNCD CARE PLAN 30 MIN: CPT

## 2023-01-01 PROCEDURE — 82274 ASSAY TEST FOR BLOOD FECAL: CPT

## 2023-01-01 PROCEDURE — 93971 EXTREMITY STUDY: CPT | Mod: RT

## 2023-01-01 PROCEDURE — 85610 PROTHROMBIN TIME: CPT

## 2023-01-01 PROCEDURE — 69210 REMOVE IMPACTED EAR WAX UNI: CPT | Performed by: NURSE PRACTITIONER

## 2023-01-01 PROCEDURE — 80061 LIPID PANEL: CPT

## 2023-01-01 PROCEDURE — 87086 URINE CULTURE/COLONY COUNT: CPT

## 2023-01-01 PROCEDURE — 83540 ASSAY OF IRON: CPT

## 2023-01-01 PROCEDURE — 99239 HOSP IP/OBS DSCHRG MGMT >30: CPT | Performed by: STUDENT IN AN ORGANIZED HEALTH CARE EDUCATION/TRAINING PROGRAM

## 2023-01-01 PROCEDURE — 82962 GLUCOSE BLOOD TEST: CPT

## 2023-01-01 PROCEDURE — 83880 ASSAY OF NATRIURETIC PEPTIDE: CPT

## 2023-01-01 PROCEDURE — 70450 CT HEAD/BRAIN W/O DYE: CPT

## 2023-01-01 PROCEDURE — 11043 DBRDMT MUSC&/FSCA 1ST 20/<: CPT | Performed by: NURSE PRACTITIONER

## 2023-01-01 PROCEDURE — 99214 OFFICE O/P EST MOD 30 MIN: CPT | Mod: 25 | Performed by: NURSE PRACTITIONER

## 2023-01-01 PROCEDURE — 99223 1ST HOSP IP/OBS HIGH 75: CPT | Mod: AI | Performed by: STUDENT IN AN ORGANIZED HEALTH CARE EDUCATION/TRAINING PROGRAM

## 2023-01-01 RX ORDER — POTASSIUM CHLORIDE 20 MEQ/1
40 TABLET, EXTENDED RELEASE ORAL ONCE
Status: COMPLETED | OUTPATIENT
Start: 2023-01-01 | End: 2023-01-01

## 2023-01-01 RX ORDER — OXYCODONE HYDROCHLORIDE 10 MG/1
10 TABLET ORAL EVERY 4 HOURS PRN
Status: DISCONTINUED | OUTPATIENT
Start: 2023-01-01 | End: 2023-01-01 | Stop reason: HOSPADM

## 2023-01-01 RX ORDER — ACETAMINOPHEN 325 MG/1
650 TABLET ORAL EVERY 4 HOURS PRN
Status: DISCONTINUED | OUTPATIENT
Start: 2023-01-01 | End: 2023-01-01 | Stop reason: HOSPADM

## 2023-01-01 RX ORDER — MAGNESIUM SULFATE 1 G/100ML
1 INJECTION INTRAVENOUS ONCE
Status: COMPLETED | OUTPATIENT
Start: 2023-01-01 | End: 2023-01-01

## 2023-01-01 RX ORDER — DEXTROSE MONOHYDRATE 25 G/50ML
25 INJECTION, SOLUTION INTRAVENOUS
Status: DISCONTINUED | OUTPATIENT
Start: 2023-01-01 | End: 2023-01-01 | Stop reason: HOSPADM

## 2023-01-01 RX ORDER — ASPIRIN 81 MG/1
81 TABLET ORAL DAILY
COMMUNITY
End: 2023-01-01

## 2023-01-01 RX ORDER — BACITRACIN ZINC AND POLYMYXIN B SULFATE 500; 1000 [USP'U]/G; [USP'U]/G
OINTMENT TOPICAL 2 TIMES DAILY
Status: DISCONTINUED | OUTPATIENT
Start: 2023-01-01 | End: 2023-01-01 | Stop reason: HOSPADM

## 2023-01-01 RX ORDER — SULFAMETHOXAZOLE AND TRIMETHOPRIM 800; 160 MG/1; MG/1
1 TABLET ORAL 2 TIMES DAILY
Qty: 14 TABLET | Refills: 0 | Status: SHIPPED | OUTPATIENT
Start: 2023-01-01 | End: 2023-01-01

## 2023-01-01 RX ORDER — SODIUM CHLORIDE, SODIUM LACTATE, POTASSIUM CHLORIDE, CALCIUM CHLORIDE 600; 310; 30; 20 MG/100ML; MG/100ML; MG/100ML; MG/100ML
1000 INJECTION, SOLUTION INTRAVENOUS ONCE
Status: COMPLETED | OUTPATIENT
Start: 2023-01-01 | End: 2023-01-01

## 2023-01-01 RX ORDER — HEPARIN SODIUM 5000 [USP'U]/ML
5000 INJECTION, SOLUTION INTRAVENOUS; SUBCUTANEOUS EVERY 8 HOURS
Status: DISCONTINUED | OUTPATIENT
Start: 2023-01-01 | End: 2023-01-01

## 2023-01-01 RX ORDER — SIMVASTATIN 80 MG
80 TABLET ORAL
Qty: 100 TABLET | Refills: 0 | Status: SHIPPED | OUTPATIENT
Start: 2023-01-01 | End: 2023-01-01

## 2023-01-01 RX ORDER — SODIUM HYPOCHLORITE 1.25 MG/ML
10 SOLUTION TOPICAL 2 TIMES DAILY
Status: SHIPPED
Start: 2023-01-01 | End: 2024-01-01

## 2023-01-01 RX ORDER — HEPARIN SODIUM 1000 [USP'U]/ML
40 INJECTION, SOLUTION INTRAVENOUS; SUBCUTANEOUS PRN
Status: DISCONTINUED | OUTPATIENT
Start: 2023-01-01 | End: 2023-01-01

## 2023-01-01 RX ORDER — TAMSULOSIN HYDROCHLORIDE 0.4 MG/1
CAPSULE ORAL
COMMUNITY
Start: 2023-01-01 | End: 2024-01-01

## 2023-01-01 RX ORDER — SIMVASTATIN 80 MG
TABLET ORAL
Qty: 100 TABLET | Refills: 0 | Status: SHIPPED | OUTPATIENT
Start: 2023-01-01 | End: 2023-01-01

## 2023-01-01 RX ORDER — TRAMADOL HYDROCHLORIDE 50 MG/1
50 TABLET ORAL EVERY 12 HOURS
Qty: 60 TABLET | Refills: 0 | Status: SHIPPED | OUTPATIENT
Start: 2023-01-01 | End: 2023-01-01 | Stop reason: SDUPTHER

## 2023-01-01 RX ORDER — SODIUM CHLORIDE, SODIUM LACTATE, POTASSIUM CHLORIDE, CALCIUM CHLORIDE 600; 310; 30; 20 MG/100ML; MG/100ML; MG/100ML; MG/100ML
INJECTION, SOLUTION INTRAVENOUS CONTINUOUS
Status: DISCONTINUED | OUTPATIENT
Start: 2023-01-01 | End: 2023-01-01

## 2023-01-01 RX ORDER — SODIUM CHLORIDE 9 MG/ML
INJECTION, SOLUTION INTRAVENOUS CONTINUOUS
Status: DISCONTINUED | OUTPATIENT
Start: 2023-01-01 | End: 2023-01-01

## 2023-01-01 RX ORDER — HEPARIN SODIUM 1000 [USP'U]/ML
80 INJECTION, SOLUTION INTRAVENOUS; SUBCUTANEOUS ONCE
Status: COMPLETED | OUTPATIENT
Start: 2023-01-01 | End: 2023-01-01

## 2023-01-01 RX ORDER — SIMVASTATIN 80 MG
80 TABLET ORAL
Status: DISCONTINUED | OUTPATIENT
Start: 2023-01-01 | End: 2023-01-01

## 2023-01-01 RX ORDER — DEXTROSE MONOHYDRATE 25 G/50ML
INJECTION, SOLUTION INTRAVENOUS
Status: COMPLETED
Start: 2023-01-01 | End: 2023-01-01

## 2023-01-01 RX ORDER — DEXTROSE MONOHYDRATE, SODIUM CHLORIDE, AND POTASSIUM CHLORIDE 50; 1.49; 4.5 G/1000ML; G/1000ML; G/1000ML
INJECTION, SOLUTION INTRAVENOUS CONTINUOUS
Status: DISCONTINUED | OUTPATIENT
Start: 2023-01-01 | End: 2023-01-01

## 2023-01-01 RX ORDER — ASPIRIN 81 MG/1
81 TABLET ORAL DAILY
Status: DISCONTINUED | OUTPATIENT
Start: 2023-01-01 | End: 2023-01-01 | Stop reason: HOSPADM

## 2023-01-01 RX ORDER — CARBOXYMETHYLCELLULOSE SODIUM 5 MG/ML
1 SOLUTION/ DROPS OPHTHALMIC PRN
Status: DISCONTINUED | OUTPATIENT
Start: 2023-01-01 | End: 2023-01-01 | Stop reason: HOSPADM

## 2023-01-01 RX ORDER — OXYCODONE HYDROCHLORIDE 5 MG/1
5 TABLET ORAL EVERY 8 HOURS PRN
Qty: 9 TABLET | Refills: 0 | Status: SHIPPED | OUTPATIENT
Start: 2023-01-01 | End: 2023-01-01

## 2023-01-01 RX ORDER — SODIUM HYPOCHLORITE 1.25 MG/ML
SOLUTION TOPICAL 2 TIMES DAILY
Status: DISCONTINUED | OUTPATIENT
Start: 2023-01-01 | End: 2023-01-01 | Stop reason: HOSPADM

## 2023-01-01 RX ORDER — HEPARIN SODIUM 5000 [USP'U]/100ML
0-30 INJECTION, SOLUTION INTRAVENOUS CONTINUOUS
Status: DISCONTINUED | OUTPATIENT
Start: 2023-01-01 | End: 2023-01-01

## 2023-01-01 RX ORDER — OXYCODONE HYDROCHLORIDE 5 MG/1
5 TABLET ORAL EVERY 4 HOURS PRN
Status: DISCONTINUED | OUTPATIENT
Start: 2023-01-01 | End: 2023-01-01 | Stop reason: HOSPADM

## 2023-01-01 RX ORDER — TRAMADOL HYDROCHLORIDE 50 MG/1
50 TABLET ORAL EVERY 12 HOURS
Qty: 60 TABLET | Refills: 0 | Status: SHIPPED | OUTPATIENT
Start: 2023-01-01 | End: 2023-01-01

## 2023-01-01 RX ORDER — FINASTERIDE 5 MG/1
5 TABLET, FILM COATED ORAL
COMMUNITY
Start: 2023-01-01 | End: 2024-01-01

## 2023-01-01 RX ADMIN — POTASSIUM CHLORIDE, DEXTROSE MONOHYDRATE AND SODIUM CHLORIDE: 150; 5; 450 INJECTION, SOLUTION INTRAVENOUS at 04:07

## 2023-01-01 RX ADMIN — DAKIN'S SOLUTION 0.125% (QUARTER STRENGTH) 473 ML: 0.12 SOLUTION at 06:33

## 2023-01-01 RX ADMIN — OXYCODONE HYDROCHLORIDE 5 MG: 5 TABLET ORAL at 15:12

## 2023-01-01 RX ADMIN — POTASSIUM CHLORIDE 40 MEQ: 1500 TABLET, EXTENDED RELEASE ORAL at 16:05

## 2023-01-01 RX ADMIN — ASPIRIN 81 MG: 81 TABLET, COATED ORAL at 04:33

## 2023-01-01 RX ADMIN — DAKIN'S SOLUTION 0.125% (QUARTER STRENGTH) 473 ML: 0.12 SOLUTION at 16:07

## 2023-01-01 RX ADMIN — MAGNESIUM SULFATE HEPTAHYDRATE 1 G: 1 INJECTION, SOLUTION INTRAVENOUS at 15:37

## 2023-01-01 RX ADMIN — APIXABAN 10 MG: 5 TABLET, FILM COATED ORAL at 06:00

## 2023-01-01 RX ADMIN — ASPIRIN 81 MG: 81 TABLET, COATED ORAL at 04:07

## 2023-01-01 RX ADMIN — DIBASIC SODIUM PHOSPHATE, MONOBASIC POTASSIUM PHOSPHATE AND MONOBASIC SODIUM PHOSPHATE 250 MG: 852; 155; 130 TABLET ORAL at 15:13

## 2023-01-01 RX ADMIN — HEPARIN SODIUM 2500 UNITS: 1000 INJECTION, SOLUTION INTRAVENOUS; SUBCUTANEOUS at 01:21

## 2023-01-01 RX ADMIN — HEPARIN SODIUM 5000 UNITS: 5000 INJECTION, SOLUTION INTRAVENOUS; SUBCUTANEOUS at 05:50

## 2023-01-01 RX ADMIN — HEPARIN SODIUM 18 UNITS/KG/HR: 5000 INJECTION, SOLUTION INTRAVENOUS at 19:43

## 2023-01-01 RX ADMIN — ASPIRIN 81 MG: 81 TABLET, COATED ORAL at 06:00

## 2023-01-01 RX ADMIN — DEXTROSE MONOHYDRATE 25 G: 25 INJECTION, SOLUTION INTRAVENOUS at 19:54

## 2023-01-01 RX ADMIN — Medication 1 EACH: at 16:07

## 2023-01-01 RX ADMIN — POTASSIUM CHLORIDE, DEXTROSE MONOHYDRATE AND SODIUM CHLORIDE: 150; 5; 450 INJECTION, SOLUTION INTRAVENOUS at 18:05

## 2023-01-01 RX ADMIN — HEPARIN SODIUM 18 UNITS/KG/HR: 5000 INJECTION, SOLUTION INTRAVENOUS at 20:13

## 2023-01-01 RX ADMIN — DEXTROSE MONOHYDRATE 25 G: 25 INJECTION, SOLUTION INTRAVENOUS at 20:13

## 2023-01-01 RX ADMIN — HEPARIN SODIUM 5000 UNITS: 1000 INJECTION, SOLUTION INTRAVENOUS; SUBCUTANEOUS at 19:58

## 2023-01-01 RX ADMIN — DEXTROSE MONOHYDRATE 25 G: 25 INJECTION, SOLUTION INTRAVENOUS at 23:19

## 2023-01-01 RX ADMIN — APIXABAN 10 MG: 5 TABLET, FILM COATED ORAL at 17:24

## 2023-01-01 RX ADMIN — HEPARIN SODIUM 16 UNITS/KG/HR: 5000 INJECTION, SOLUTION INTRAVENOUS at 18:13

## 2023-01-01 RX ADMIN — SODIUM CHLORIDE, POTASSIUM CHLORIDE, SODIUM LACTATE AND CALCIUM CHLORIDE 1000 ML: 600; 310; 30; 20 INJECTION, SOLUTION INTRAVENOUS at 21:42

## 2023-01-01 RX ADMIN — IOHEXOL 49 ML: 350 INJECTION, SOLUTION INTRAVENOUS at 17:00

## 2023-01-01 RX ADMIN — SODIUM CHLORIDE, POTASSIUM CHLORIDE, SODIUM LACTATE AND CALCIUM CHLORIDE: 600; 310; 30; 20 INJECTION, SOLUTION INTRAVENOUS at 15:19

## 2023-01-01 RX ADMIN — Medication 1 EACH: at 04:33

## 2023-01-01 RX ADMIN — DAKIN'S SOLUTION 0.125% (QUARTER STRENGTH) 473 ML: 0.12 SOLUTION at 04:33

## 2023-01-01 RX ADMIN — POTASSIUM CHLORIDE, DEXTROSE MONOHYDRATE AND SODIUM CHLORIDE: 150; 5; 450 INJECTION, SOLUTION INTRAVENOUS at 09:04

## 2023-01-01 RX ADMIN — SODIUM CHLORIDE, POTASSIUM CHLORIDE, SODIUM LACTATE AND CALCIUM CHLORIDE 1000 ML: 600; 310; 30; 20 INJECTION, SOLUTION INTRAVENOUS at 20:21

## 2023-01-01 RX ADMIN — HEPARIN SODIUM 5000 UNITS: 5000 INJECTION, SOLUTION INTRAVENOUS; SUBCUTANEOUS at 15:13

## 2023-01-01 RX ADMIN — OXYCODONE HYDROCHLORIDE 5 MG: 5 TABLET ORAL at 10:12

## 2023-01-01 RX ADMIN — SODIUM CHLORIDE: 9 INJECTION, SOLUTION INTRAVENOUS at 10:21

## 2023-01-01 RX ADMIN — SODIUM CHLORIDE: 9 INJECTION, SOLUTION INTRAVENOUS at 00:04

## 2023-01-01 RX ADMIN — POTASSIUM PHOSPHATE, MONOBASIC AND POTASSIUM PHOSPHATE, DIBASIC 30 MMOL: 224; 236 INJECTION, SOLUTION, CONCENTRATE INTRAVENOUS at 17:20

## 2023-01-01 RX ADMIN — Medication 1 EACH: at 06:33

## 2023-01-01 ASSESSMENT — COGNITIVE AND FUNCTIONAL STATUS - GENERAL
SUGGESTED CMS G CODE MODIFIER DAILY ACTIVITY: CM
SUGGESTED CMS G CODE MODIFIER MOBILITY: CM
STANDING UP FROM CHAIR USING ARMS: TOTAL
DAILY ACTIVITIY SCORE: 8
SUGGESTED CMS G CODE MODIFIER DAILY ACTIVITY: CM
DRESSING REGULAR LOWER BODY CLOTHING: TOTAL
WALKING IN HOSPITAL ROOM: TOTAL
STANDING UP FROM CHAIR USING ARMS: TOTAL
DRESSING REGULAR UPPER BODY CLOTHING: TOTAL
TURNING FROM BACK TO SIDE WHILE IN FLAT BAD: A LOT
PERSONAL GROOMING: A LOT
MOVING TO AND FROM BED TO CHAIR: UNABLE
WALKING IN HOSPITAL ROOM: TOTAL
MOBILITY SCORE: 7
DRESSING REGULAR LOWER BODY CLOTHING: TOTAL
MOVING FROM LYING ON BACK TO SITTING ON SIDE OF FLAT BED: UNABLE
PERSONAL GROOMING: A LOT
DRESSING REGULAR UPPER BODY CLOTHING: A LOT
TOILETING: TOTAL
CLIMB 3 TO 5 STEPS WITH RAILING: TOTAL
MOVING FROM LYING ON BACK TO SITTING ON SIDE OF FLAT BED: UNABLE
CLIMB 3 TO 5 STEPS WITH RAILING: TOTAL
EATING MEALS: A LOT
EATING MEALS: A LOT
DAILY ACTIVITIY SCORE: 8
TURNING FROM BACK TO SIDE WHILE IN FLAT BAD: UNABLE
TOILETING: TOTAL
EATING MEALS: A LOT
HELP NEEDED FOR BATHING: TOTAL
TOILETING: TOTAL
SUGGESTED CMS G CODE MODIFIER MOBILITY: CN
CLIMB 3 TO 5 STEPS WITH RAILING: TOTAL
PERSONAL GROOMING: A LOT
STANDING UP FROM CHAIR USING ARMS: TOTAL
SUGGESTED CMS G CODE MODIFIER DAILY ACTIVITY: CL
MOVING FROM LYING ON BACK TO SITTING ON SIDE OF FLAT BED: UNABLE
MOBILITY SCORE: 7
MOBILITY SCORE: 6
MOVING TO AND FROM BED TO CHAIR: UNABLE
WALKING IN HOSPITAL ROOM: TOTAL
DAILY ACTIVITIY SCORE: 9
DRESSING REGULAR UPPER BODY CLOTHING: TOTAL
DRESSING REGULAR LOWER BODY CLOTHING: TOTAL
MOVING TO AND FROM BED TO CHAIR: UNABLE
TURNING FROM BACK TO SIDE WHILE IN FLAT BAD: A LOT
SUGGESTED CMS G CODE MODIFIER MOBILITY: CM

## 2023-01-01 ASSESSMENT — ENCOUNTER SYMPTOMS
MYALGIAS: 0
VOMITING: 0
DIZZINESS: 0
DOUBLE VISION: 0
HEADACHES: 0
GENERAL WELL-BEING: FAIR
MYALGIAS: 0
SPUTUM PRODUCTION: 0
SHORTNESS OF BREATH: 0
NAUSEA: 0
HEADACHES: 0
NAUSEA: 0
CLAUDICATION: 0
PALPITATIONS: 0
ORTHOPNEA: 0
DEPRESSION: 0
FALLS: 1
PALPITATIONS: 0
HEMOPTYSIS: 0
COUGH: 0
ABDOMINAL PAIN: 0
MEMORY LOSS: 1
HEADACHES: 0
WHEEZING: 0
ABDOMINAL PAIN: 0
FEVER: 0
WHEEZING: 0
MYALGIAS: 1
FEVER: 0
HEADACHES: 0
ABDOMINAL PAIN: 0
MEMORY LOSS: 1
MEMORY LOSS: 1
SHORTNESS OF BREATH: 0
MYALGIAS: 1
BLURRED VISION: 0
VOMITING: 0
NAUSEA: 0
HEARTBURN: 0
DEPRESSION: 0
SHORTNESS OF BREATH: 0
COUGH: 0
SHORTNESS OF BREATH: 0
CLAUDICATION: 0
NAUSEA: 0
NAUSEA: 0
PALPITATIONS: 0
EYE PAIN: 1
EYE DISCHARGE: 0
BACK PAIN: 0
COUGH: 0
WHEEZING: 0
PALPITATIONS: 0
ABDOMINAL PAIN: 0
MYALGIAS: 0
WEAKNESS: 1
MYALGIAS: 1
BRUISES/BLEEDS EASILY: 0
DIZZINESS: 0
PND: 0
DIZZINESS: 0
COUGH: 0
BACK PAIN: 0
WEAKNESS: 1
CHILLS: 0
NERVOUS/ANXIOUS: 0
HEADACHES: 0
WEAKNESS: 1
BLOOD IN STOOL: 0
WEAKNESS: 1
DIZZINESS: 0
CHILLS: 0
HEMOPTYSIS: 0
SHORTNESS OF BREATH: 0
NECK PAIN: 0
DEPRESSION: 0
HEADACHES: 0
BRUISES/BLEEDS EASILY: 0
FEVER: 0
PND: 0
CHILLS: 0
DIZZINESS: 0
FEVER: 0
DIZZINESS: 0
WHEEZING: 0
SHORTNESS OF BREATH: 0
MYALGIAS: 0
CHILLS: 0

## 2023-01-01 ASSESSMENT — FIBROSIS 4 INDEX
FIB4 SCORE: 7.04
FIB4 SCORE: 4.9
FIB4 SCORE: 4.85
FIB4 SCORE: 1.26
FIB4 SCORE: 1.26
FIB4 SCORE: 1.61
FIB4 SCORE: 1.26
FIB4 SCORE: 6.79

## 2023-01-01 ASSESSMENT — PAIN DESCRIPTION - PAIN TYPE
TYPE: ACUTE PAIN

## 2023-01-01 ASSESSMENT — PATIENT HEALTH QUESTIONNAIRE - PHQ9
CLINICAL INTERPRETATION OF PHQ2 SCORE: 0
1. LITTLE INTEREST OR PLEASURE IN DOING THINGS: NOT AT ALL
2. FEELING DOWN, DEPRESSED, IRRITABLE, OR HOPELESS: NOT AT ALL
SUM OF ALL RESPONSES TO PHQ9 QUESTIONS 1 AND 2: 0

## 2023-01-01 ASSESSMENT — GAIT ASSESSMENTS: GAIT LEVEL OF ASSIST: UNABLE TO PARTICIPATE

## 2023-01-01 ASSESSMENT — ACTIVITIES OF DAILY LIVING (ADL)
TOILETING: INDEPENDENT
BATHING_REQUIRES_ASSISTANCE: 0

## 2023-04-11 PROBLEM — Z91.81 RISK FOR FALLS: Status: ACTIVE | Noted: 2023-01-01

## 2023-04-12 NOTE — PROGRESS NOTES
Chief Complaint   Patient presents with    Annual Exam       HPI:  Moshe is a 87 y.o. here for Medicare Annual Wellness Visit    Problem   Risk for Falls    Chronic in nature. Reports unsteady gait following recent hip procedure. He utilizes a walker for stability in mobilizing. He also states he uses a urinal for when he has to void at night.      Osteoarthritis of Hip    Improved since right hip procedure. Reports less right hip and knee pain following his total hip replacement surgery.      Chronic Pain of Right Knee    Chronic in nature. Reports his right knee pain has improved significantly after his right hip replacement. He was told he has mild arthritis in his right knee that has been treated with cortisone injections. His right knee pain has significantly improved after his last cortisone injection almost 6 months ago. No current effusion or swelling to the lower extremities.      Mixed Hyperlipidemia    Chronic in nature. Stable. Currently taking simvastatin 80 mg nightly without side effects. Last labs indicate excellent numbers.      Bilateral Impacted Cerumen    Intermittent issue for patient. Today bilateral ears without evidence of cerumen impaction.      Elevated Psa Measurement    Chronic in nature. Continuous and gradual increase in PSA with each measurement. Most recent value 35.10. Patient has followed with urology in the past and is currently monitoring this with yearly PSAs. Reports new urinary frequency symptoms with associated urinary dribbling. He states he needs to void multiple times throughout the night, sometimes every 45 minutes. States this has not directly impacted his sleep quality but is bothersome to him. Denies dysuria, no fevers or confusion.           Patient Active Problem List    Diagnosis Date Noted    Risk for falls 04/11/2023    Primary osteoarthritis of one hip, right 11/01/2022    Osteoarthritis of hip 09/26/2022    Chronic pain of right knee 09/01/2022    Effusion, right  knee 09/01/2022    Mixed hyperlipidemia 01/08/2019    Bilateral impacted cerumen 01/08/2019    Elevated PSA measurement 01/08/2019       Current Outpatient Medications   Medication Sig Dispense Refill    simvastatin (ZOCOR) 80 MG tablet TAKE  ONE TABLET BY MOUTH NIGHTLY AT BEDTIME 100 Tablet 3    omeprazole (PRILOSEC) 20 MG delayed-release capsule Take 20 mg by mouth every day.      SUPER B COMPLEX/C PO Take  by mouth.      Multiple Vitamins-Minerals (CENTRUM ADULTS PO) Take  by mouth.      Cholecalciferol (D3-50 PO) Take  by mouth.       No current facility-administered medications for this visit.        Patient is taking medications as noted in medication list.  Current supplements as per medication list.     Allergies: Patient has no known allergies.    Current social contact/activities: Family and friends come visit     Is patient current with immunizations? Yes.    He  reports that he quit smoking about 20 years ago. His smoking use included cigarettes. He has a 30.00 pack-year smoking history. He has never used smokeless tobacco. He reports current alcohol use. He reports that he does not use drugs.  Counseling given: Yes      ROS:    Gait: Uses a walker   Ostomy: No   Other tubes: No   Amputations: No   Chronic oxygen use No   Last eye exam Unsure   Wears hearing aids: No   : Reports urinary leakage during the last 6 months that has not interfered at all with their daily activities or sleep.    Screening:    Depression Screening  Little interest or pleasure in doing things?  0 - not at all  Feeling down, depressed, or hopeless? 0 - not at all  Patient Health Questionnaire Score: 0    If depressive symptoms identified deferred to follow up visit unless specifically addressed in assessment and plan.    Interpretation of PHQ-9 Total Score   Score Severity   1-4 No Depression   5-9 Mild Depression   10-14 Moderate Depression   15-19 Moderately Severe Depression   20-27 Severe Depression    Screening for  Cognitive Impairment  Three Minute Recall (daughter, heaven, mountain)  2/3    Gabo clock face with all 12 numbers and set the hands to show 10 past 11.  No    If cognitive concerns identified, deferred for follow up unless specifically addressed in assessment and plan.    Fall Risk Assessment  Has the patient had two or more falls in the last year or any fall with injury in the last year?  Yes  If fall risk identified, deferred for follow up unless specifically addressed in assessment and plan.    Safety Assessment  Throw rugs on floor.  Yes  Handrails on all stairs.  Yes  Good lighting in all hallways.  Yes  Difficulty hearing.  Yes  Patient counseled about all safety risks that were identified.    Functional Assessment ADLs  Are there any barriers preventing you from cooking for yourself or meeting nutritional needs?  No.    Are there any barriers preventing you from driving safely or obtaining transportation?  No.    Are there any barriers preventing you from using a telephone or calling for help?  No.    Are there any barriers preventing you from shopping?  Yes.  Has support at home   Are there any barriers preventing you from taking care of your own finances?  Yes.    Are there any barriers preventing you from managing your medications?  No.    Are there any barriers preventing you from showering, bathing or dressing yourself?  No.    Are you currently engaging in any exercise or physical activity?  No.     What is your perception of your health?  Fair.    Advance Care Planning  Do you have an Advance Directive, Living Will, Durable Power of , or POLST? Yes  Advance Directive Living Will Durable Power of    is not on file - instructed patient to bring in a copy to scan into their chart    Health Maintenance Summary            Overdue - IMM DTaP/Tdap/Td Vaccine (1 - Tdap) Overdue - never done      No completion history exists for this topic.              Overdue - IMM ZOSTER VACCINES (1 of 2)  Overdue - never done      No completion history exists for this topic.              Overdue - IMM PNEUMOCOCCAL VACCINE: 65+ Years (1 - PCV) Overdue - never done      No completion history exists for this topic.              Overdue - COVID-19 Vaccine (4 - Booster for Pfizer series) Overdue since 2/15/2022      12/21/2021  Imm Admin: PFIZER PURPLE CAP SARS-COV-2 VACCINATION (12+)    05/06/2021  Imm Admin: PFIZER PURPLE CAP SARS-COV-2 VACCINATION (12+)    04/13/2021  Imm Admin: PFIZER PURPLE CAP SARS-COV-2 VACCINATION (12+)              Overdue - Annual Wellness Visit (Every 366 Days) Overdue since 3/30/2023      03/29/2022  Level of Service: ID ANNUAL WELLNESS VISIT-INCLUDES PPPS SUBSEQUE*    03/29/2022  Visit Dx: Medicare annual wellness visit, subsequent    02/22/2021  Level of Service: ID ANNUAL WELLNESS VISIT-INCLUDES PPPS SUBSEQUE*    02/22/2021  Visit Dx: Medicare annual wellness visit, subsequent    02/20/2020  Level of Service: ID ANNUAL WELLNESS VISIT-INCLUDES PPPS SUBSEQUE*    Only the first 5 history entries have been loaded, but more history exists.              IMM INFLUENZA (Season Ended) Next due on 9/1/2023      No completion history exists for this topic.              IMM HEP B VACCINE (Series Information) Aged Out      No completion history exists for this topic.              IMM MENINGOCOCCAL ACWY VACCINE (Series Information) Aged Out      No completion history exists for this topic.              Discontinued - COLORECTAL CANCER SCREENING  Discontinued        Frequency changed to Never automatically (Topic No Longer Applies)    09/26/2013  REFERRAL TO GI FOR COLONOSCOPY                    Patient Care Team:  COLEEN Keller as PCP - General (Family Medicine)  Deshawn Del Valle M.D. (Orthopedic Surgery)    Social History     Tobacco Use    Smoking status: Former     Packs/day: 3.00     Years: 10.00     Pack years: 30.00     Types: Cigarettes     Quit date: 1/8/2003     Years since  "quittin.2    Smokeless tobacco: Never   Vaping Use    Vaping Use: Never used   Substance Use Topics    Alcohol use: Yes     Comment: Occ     Drug use: No     History reviewed. No pertinent family history.  He  has a past medical history of Arthritis, Dental disorder, Heart burn, High cholesterol, Hyperlipidemia, and Macular degeneration of both eyes.   Past Surgical History:   Procedure Laterality Date    [place] TOTAL HIP ARTHROPLASTY Right 2022    Procedure: ARTHROPLASTY, HIP, TOTAL, ANTERIOR APPROACH;  Surgeon: Deshawn Del Valle M.D.;  Location: SURGERY [facility];  Service: Orthopedics    HERNIA REPAIR Bilateral     inguinal x3    HIP REPLACEMENT, TOTAL Left        Exam:   /40 (BP Location: Left arm, Patient Position: Sitting, BP Cuff Size: Adult)   Pulse 80   Temp 36.7 °C (98.1 °F) (Temporal)   Ht 1.854 m (6' 1\")   Wt 63.9 kg (140 lb 12.8 oz)   SpO2 96%  Body mass index is 18.58 kg/m².    Hearing satisfactory.    Dentition upper and lower dentures  Alert, oriented in no acute distress.  Eye contact is good, speech goal directed, affect calm     Physical Exam  Vitals reviewed.   Constitutional:       Appearance: Normal appearance.   HENT:      Right Ear: Tympanic membrane, ear canal and external ear normal. There is no impacted cerumen.      Left Ear: Tympanic membrane, ear canal and external ear normal. There is no impacted cerumen.      Mouth/Throat:      Mouth: Mucous membranes are moist.      Pharynx: Oropharynx is clear. No oropharyngeal exudate or posterior oropharyngeal erythema.   Eyes:      Extraocular Movements: Extraocular movements intact.      Conjunctiva/sclera: Conjunctivae normal.      Pupils: Pupils are equal, round, and reactive to light.   Cardiovascular:      Rate and Rhythm: Normal rate and regular rhythm.      Pulses: Normal pulses.      Heart sounds: Normal heart sounds.   Pulmonary:      Effort: Pulmonary effort is normal.      Breath sounds: Normal breath sounds. "   Musculoskeletal:      Cervical back: Normal range of motion and neck supple.   Skin:     General: Skin is warm and dry.   Neurological:      Mental Status: He is alert and oriented to person, place, and time.   Psychiatric:         Mood and Affect: Mood normal.         Behavior: Behavior normal.         Thought Content: Thought content normal.        Assessment and Plan. The following treatment and monitoring plan is recommended:      Problem List Items Addressed This Visit       Bilateral impacted cerumen     - Continue to monitor          Chronic pain of right knee     - Continue follow up and management by orthopedic specialist          Elevated PSA measurement     - Referral to urology for symptomatic elevated PSA level          Mixed hyperlipidemia     - Continue simvastatin 80 mg nightly          Osteoarthritis of hip     - Referral to physical therapy for balance and strength conditioning          Risk for falls     - Referral to physical therapy   - Continue to utilize walker for mobilization support   - Maintain home safety by reducing trip hazards          Relevant Orders    Patient identified as fall risk.  Appropriate orders and counseling given.    Referral to Physical Therapy     Other Visit Diagnoses       Elevated PSA, greater than or equal to 20 ng/ml        Relevant Orders    Referral to Urology    Urinary frequency        Relevant Orders    Referral to Urology    S/P hip replacement, right        Relevant Orders    Referral to Physical Therapy    Balance problem        Relevant Orders    Referral to Physical Therapy               Services suggested: No services needed at this time  Health Care Screening recommendations as per orders if indicated.  Referrals offered: PT/OT/Nutrition counseling/Behavioral Health/Smoking cessation as per orders if indicated.    Discussion today about general wellness and lifestyle habits:    Prevent falls and reduce trip hazards; Cautioned about securing or  removing rugs.  Have a working fire alarm and carbon monoxide detector;   Engage in regular physical activity and social activities     Follow-up: Return in about 6 months (around 10/11/2023) for Medication Review.

## 2023-04-12 NOTE — ASSESSMENT & PLAN NOTE
- Referral to physical therapy   - Continue to utilize walker for mobilization support   - Maintain home safety by reducing trip hazards

## 2023-07-18 PROBLEM — H92.02 EAR PAIN, LEFT: Status: ACTIVE | Noted: 2023-01-01

## 2023-07-18 NOTE — ASSESSMENT & PLAN NOTE
- Due to ear pain and noted congestion patient is recommended to use Flonase or Nasacort daily over the next several days, drink plenty of fluids, contact this provider if pain does not improve or if he develops any further symptoms such as fever.

## 2023-07-18 NOTE — PROGRESS NOTES
Subjective:     Chief Complaint   Patient presents with    Otalgia     Ear pain on the left side moving down into jaw, Pt reports not even being able to chew. Pain has been on going for weeks.   Pt reports now both sides are hurting.        HPI:   Moshe presents today with     Problem   Ear Pain, Left    States started on the left side and now he is having pain in his ears and in the left jaw. States over the last week started having pain on both sides. No fever or chills. No nausea/vomiting. Has not been swimming recently.     Bilateral Impacted Cerumen    This is an intermittent issue for this patient.  Patient is here today with severe bilateral ear pain which started on the left and then moved to the right patient states that it has been traveling down his jaw and that he is worried about infection.  Denies any fevers, chills, nausea vomiting or urinary symptoms.  He has to frequently have cerumen impaction removed related to ear pain and pressure, decreased hearing.  Assessment today indicates bilateral cerumen impaction which is removed by provider after flushing by MA.      Procedure: Cerumen Removal  Risks and benefits of procedure discussed with patient.  Cerumen softened with lavage, removed by currette.   Patient tolerated the procedure well  Pt educated about proper care of ear canal. Q-tip cleaning discouraged, use of debrox and warm water lavage discussed.  Post lavage curette performed by provider, Post procedure exam with clear canal and normal TM.     Procedure no evidence of ear infection, there is bilateral effusion, redness, no inflammation no appearance of pus.             Current Outpatient Medications Ordered in Epic   Medication Sig Dispense Refill    simvastatin (ZOCOR) 80 MG tablet TAKE ONE TABLET BY MOUTH AT BEDTIME 100 Tablet 0    omeprazole (PRILOSEC) 20 MG delayed-release capsule Take 20 mg by mouth every day.      SUPER B COMPLEX/C PO Take  by mouth.      Multiple Vitamins-Minerals  "(CENTRUM ADULTS PO) Take  by mouth.      Cholecalciferol (D3-50 PO) Take  by mouth.       No current Epic-ordered facility-administered medications on file.       Health Maintenance: declines vaccines today    Review of Systems   Constitutional:  Negative for chills, fever and malaise/fatigue.   Respiratory:  Negative for cough, shortness of breath and wheezing.    Cardiovascular:  Negative for chest pain and palpitations.   Gastrointestinal:  Negative for abdominal pain.   Genitourinary:  Negative for dysuria.   Musculoskeletal:  Negative for myalgias.   Neurological:  Negative for dizziness and headaches.   Psychiatric/Behavioral:  Negative for depression and suicidal ideas.          Objective:     Exam:  /58 (BP Location: Left arm, Patient Position: Sitting, BP Cuff Size: Adult)   Pulse 68   Temp 36.6 °C (97.8 °F) (Temporal)   Ht 1.854 m (6' 1\")   Wt 64.5 kg (142 lb 3.2 oz)   SpO2 95%   BMI 18.76 kg/m²  Body mass index is 18.76 kg/m².    Physical Exam  Constitutional:       Appearance: Normal appearance.   HENT:      Head: Normocephalic and atraumatic.   Cardiovascular:      Rate and Rhythm: Normal rate.   Pulmonary:      Effort: Pulmonary effort is normal.   Skin:     General: Skin is warm and dry.      Capillary Refill: Capillary refill takes less than 2 seconds.   Neurological:      General: No focal deficit present.      Mental Status: He is alert and oriented to person, place, and time.       Assessment & Plan:     87 y.o. male with the following -     Problem List Items Addressed This Visit       Bilateral impacted cerumen     - Due to ear pain and noted congestion patient is recommended to use Flonase or Nasacort daily over the next several days, drink plenty of fluids, contact this provider if pain does not improve or if he develops any further symptoms such as fever.         Ear pain, left         Return if symptoms worsen or fail to improve.    I have placed the below orders and discussed " them with an approved delegating provider. The MA is performing the below orders under the direction of Dr. Valle.     Please note that this dictation was created using voice recognition software. I have made every reasonable attempt to correct obvious errors, but I expect that there are errors of grammar and possibly content that I did not discover before finalizing the note.

## 2023-08-25 PROBLEM — T79.6XXA TRAUMATIC RHABDOMYOLYSIS, INITIAL ENCOUNTER (HCC): Status: ACTIVE | Noted: 2023-01-01

## 2023-08-26 PROBLEM — E87.6 HYPOKALEMIA: Status: ACTIVE | Noted: 2023-01-01

## 2023-08-26 PROBLEM — M79.89 SWELLING OF RIGHT HAND: Status: ACTIVE | Noted: 2023-01-01

## 2023-08-26 PROBLEM — Z71.89 ACP (ADVANCE CARE PLANNING): Status: ACTIVE | Noted: 2023-01-01

## 2023-08-26 PROBLEM — E83.39 HYPOPHOSPHATEMIA: Status: ACTIVE | Noted: 2023-01-01

## 2023-08-26 PROBLEM — I44.7 LBBB (LEFT BUNDLE BRANCH BLOCK): Status: ACTIVE | Noted: 2023-01-01

## 2023-08-26 PROBLEM — E78.5 HYPERLIPIDEMIA: Status: ACTIVE | Noted: 2019-01-08

## 2023-08-26 PROBLEM — R33.9 URINARY RETENTION: Status: ACTIVE | Noted: 2023-01-01

## 2023-08-26 PROBLEM — E87.1 HYPONATREMIA: Status: ACTIVE | Noted: 2023-01-01

## 2023-08-26 PROBLEM — R79.89 ELEVATED TROPONIN: Status: ACTIVE | Noted: 2023-01-01

## 2023-08-26 PROBLEM — S09.93XA FACIAL TRAUMA: Status: ACTIVE | Noted: 2023-01-01

## 2023-08-26 NOTE — ASSESSMENT & PLAN NOTE
Troponin 42, 58, 29.,  Demand ischemia  EKG negative for STEMI.  He denies chest pain.  Does have PE  TTE pending

## 2023-08-26 NOTE — ED NOTES
Pt found to be incontinent of large amount of soft stool. Full linen change provided and pt cleaned. Partial thickness wounds noted to R chest/abdomen, R hip, R face, and buttocks.

## 2023-08-26 NOTE — ED NOTES
Pt in bed and breathing with even, unlabored respirations. Pt denies any additional needs at this time.

## 2023-08-26 NOTE — CARE PLAN
The patient is Stable - Low risk of patient condition declining or worsening    Shift Goals  Clinical Goals: wound care and speech eval  Patient Goals: sleep    Progress made toward(s) clinical / shift goals:  RN cleansed and dressed wounds. Wound care consult had been placed. Patient was AOX4 but sometimes confused and repetitive in conversation. Patient remained NPO for speech eval in the morning. Patient remained fall free with 2 alarms on. Patient did not know how to use call lights after RN educated patient. Pressure injury protocols initiated    Patient is not progressing towards the following goals:

## 2023-08-26 NOTE — ED NOTES
Called lab & micro to inquire on status of blood cultures as only one set was listed in process. Phlebotomist stated she sent both sets. Per micro tech, neither set has been received. Per micro, have phlebotomy redraw both sets. Phlebotomist stated she would come to bedside to redraw but needed the cultures to be re-ordered. Repeat culture orders placed as per phlebotomy.

## 2023-08-26 NOTE — HOSPITAL COURSE
Moshe Guerrero is a 87 y.o. male who presented 8/25/2023 with generalized weakness.  History taken by chart review as patient unable to provide reliable history at this time.  Patient last known to be well on August 28.  Patient was visited by family today and was found down on the ground and confused.  He was found to have several areas of skin breakdown and skin tears on the right side of his body.  He was then taken to ER for further evaluation.     At ER, patient found to be hypertensive.  Pertinent labs include sodium 148, potassium 3.5, prerenal azotemia, CPK over 2000.  CT head showed no acute intracranial abnormality.

## 2023-08-26 NOTE — ED PROVIDER NOTES
ED Provider Note    CHIEF COMPLAINT  Chief Complaint   Patient presents with    GLF       EXTERNAL RECORDS REVIEWED  Outpatient Notes patient has a history of bilateral hip replacements as well as osteoarthritis and prostate dysfunction    HPI/ROS  LIMITATION TO HISTORY   Select: Altered mental status / Confusion  OUTSIDE HISTORIAN(S):  Family patient's daughter at the bedside states that he was last seen normal on  they went on a camping trip and then came home today to check on him and found him on the ground.  EMS was called his glucose was 134 they treated him with an amp of bicarb L and a liter of normal saline and 75 mcg of fentanyl.    Moshe Guerrero is a 87 y.o. male who presents to the emergency department with a fall or possible syncope.  The patient thinks he fell because he was dizzy he does not know which day he fell he does not know how long he had been down for but he was down on the ground.  He states that he is got chest wall pain and right arm pain and right hip pain.  No real headache but he does endorse that he hit his head at the time.  He is not taking any medications for several days.  He believes that his tetanus is up-to-date.  He just feels fatigued and tired and really dehydrated.    PAST MEDICAL HISTORY   has a past medical history of Arthritis, Dental disorder, Heart burn, High cholesterol, Hyperlipidemia, and Macular degeneration of both eyes.    SURGICAL HISTORY   has a past surgical history that includes hip replacement, total (Left); hernia repair (Bilateral); and total hip arthroplasty (Right, 2022).    FAMILY HISTORY  No family history on file.    SOCIAL HISTORY  Social History     Tobacco Use    Smoking status: Former     Current packs/day: 0.00     Average packs/day: 3.0 packs/day for 10.0 years (30.0 ttl pk-yrs)     Types: Cigarettes     Start date: 1993     Quit date: 2003     Years since quittin.6    Smokeless tobacco: Never   Vaping Use    Vaping Use:  "Never used   Substance and Sexual Activity    Alcohol use: Yes     Comment: Occ     Drug use: No    Sexual activity: Never       CURRENT MEDICATIONS  Home Medications       Reviewed by Sukhdeep Guillen R.N. (Registered Nurse) on 08/25/23 at 1919  Med List Status: <None>     Medication Last Dose Status   Cholecalciferol (D3-50 PO)  Active   Multiple Vitamins-Minerals (CENTRUM ADULTS PO)  Active   omeprazole (PRILOSEC) 20 MG delayed-release capsule  Active   simvastatin (ZOCOR) 80 MG tablet  Active   SUPER B COMPLEX/C PO  Active                    ALLERGIES  No Known Allergies    PHYSICAL EXAM  VITAL SIGNS: BP (!) 144/71   Pulse 69   Temp 36.7 °C (98.1 °F) (Temporal)   Resp (!) 26   Ht 1.854 m (6' 1\")   Wt 64.5 kg (142 lb 3.2 oz)   SpO2 94%   BMI 18.76 kg/m²    Pulse OX: Pulse Oxygen level is within normal limits  Constitutional: Alert in no apparent distress.  HENT: Normocephalic, pressure wound to the right cheek no midline neck tenderness MMM  Eyes: PERound. Conjunctiva normal, non-icteric.   Heart: Regular rate and rhythm, intact distal pulses   Lungs: Symmetrical movement, no resp distress, right chest wall tenderness and pressure wounds to the right chest wall with some erythema but no evidence of cellulitis  Abdomen: Non-tender, non-distended, normal bowel sounds  EXT/Back tenderness over the proximal humerus but no overt deformities to minor pressure wounds to the right upper extremity pressure wounds over the right lateral hip which are almost necrotic in the center that leg is internally rotated but not shortened and just hurts with movement.  DP pulse 2+ bilaterally  Skin: Warm, Dry, No erythema, No rash.   Neurologic: Alert and oriented to person and place but not time, Grossly non-focal.       DIAGNOSTIC STUDIES / PROCEDURES  EKG  I have independently interpreted this EKG  Results for orders placed or performed during the hospital encounter of 08/25/23   EKG   Result Value Ref Range    Report       " [facility]    Test Date:  2023  Pt Name:    JOSELYN BUSH                    Department: ER  MRN:        [id number]                      Room:       BL 13  Gender:     Male                         Technician: [id number]  :        1935                   Requested By:ER TRIAGE PROTOCOL  Order #:    [id number]                    Reading MD:    Measurements  Intervals                                Axis  Rate:       69                           P:          41  MI:         154                          QRS:        -71  QRSD:       132                          T:          89  QT:         469  QTc:        503    Interpretive Statements  Sinus rhythm  Left bundle branch block  Compared to ECG 10/18/2022 16:55:36  Left bundle-branch block now present  Left anterior fascicular block no longer present           LABS  Labs Reviewed   CBC WITH DIFFERENTIAL - Abnormal; Notable for the following components:       Result Value    MCH 26.6 (*)     Neutrophils-Polys 81.10 (*)     Lymphocytes 9.90 (*)     Lymphs (Absolute) 0.66 (*)     All other components within normal limits    Narrative:     Biotin intake of greater than 5 mg per day may interfere with  troponin levels, causing false low values.  Indicate which anticoagulants the patient is on:->UNKNOWN   COMP METABOLIC PANEL - Abnormal; Notable for the following components:    Sodium 148 (*)     Potassium 3.5 (*)     Anion Gap 19.0 (*)     Glucose 120 (*)     Bun 40 (*)     AST(SGOT) 90 (*)     All other components within normal limits    Narrative:     Biotin intake of greater than 5 mg per day may interfere with  troponin levels, causing false low values.  Indicate which anticoagulants the patient is on:->UNKNOWN   TROPONIN - Abnormal; Notable for the following components:    Troponin T 42 (*)     All other components within normal limits    Narrative:     Biotin intake of greater than 5 mg per day may interfere with  troponin levels, causing  false low values.  Indicate which anticoagulants the patient is on:->UNKNOWN   URINALYSIS,CULTURE IF INDICATED - Abnormal; Notable for the following components:    Ketones 40 (*)     Protein 100 (*)     Occult Blood Small (*)     All other components within normal limits    Narrative:     Indication for culture:->Patient WITHOUT an indwelling Mejía  catheter in place with new onset of Dysuria, Frequency,  Urgency, and/or Suprapubic pain  Release to patient->Immediate   CREATINE KINASE - Abnormal; Notable for the following components:    CPK Total >2000 (*)     All other components within normal limits    Narrative:     Biotin intake of greater than 5 mg per day may interfere with  troponin levels, causing false low values.  Indicate which anticoagulants the patient is on:->UNKNOWN   URINE MICROSCOPIC (W/UA) - Abnormal; Notable for the following components:    WBC 2-5 (*)     RBC 5-10 (*)     Hyaline Cast 3-5 (*)     All other components within normal limits    Narrative:     Indication for culture:->Patient WITHOUT an indwelling Mejía  catheter in place with new onset of Dysuria, Frequency,  Urgency, and/or Suprapubic pain  Release to patient->Immediate   LACTIC ACID    Narrative:     Biotin intake of greater than 5 mg per day may interfere with  troponin levels, causing false low values.  Indicate which anticoagulants the patient is on:->UNKNOWN   PROTHROMBIN TIME    Narrative:     Biotin intake of greater than 5 mg per day may interfere with  troponin levels, causing false low values.  Indicate which anticoagulants the patient is on:->UNKNOWN   APTT    Narrative:     Biotin intake of greater than 5 mg per day may interfere with  troponin levels, causing false low values.  Indicate which anticoagulants the patient is on:->UNKNOWN   ESTIMATED GFR    Narrative:     Biotin intake of greater than 5 mg per day may interfere with  troponin levels, causing false low values.  Indicate which anticoagulants the patient is  "on:->UNKNOWN   BLOOD CULTURE   BLOOD CULTURE    Narrative:     Per Hospital Policy: Only change Specimen Src: to \"Line\" if  specified by physician order.  Release to patient->Immediate   BLOOD CULTURE   BLOOD CULTURE         RADIOLOGY  I have independently interpreted the diagnostic imaging associated with this visit and am waiting the final reading from the radiologist.   My preliminary interpretation is as follows:     Chest x-ray without evidence of pneumonia nor overt rib fracture  Right humerus no evidence of fracture or osteoarthritis is present  Right hip prosthesis is present no evidence of fracture dislocation degenerative changes noted  CT head without fracture or bleed    Radiologist interpretation:     CT-HEAD W/O   Final Result         1.  No acute intracranial abnormality is identified, there are nonspecific white matter changes, commonly associated with small vessel ischemic disease.  Associated mild cerebral atrophy is noted.   2.  Bilateral maxillary, ethmoid, and frontal sinusitis changes   3.  Atherosclerosis.         DX-CHEST-PORTABLE (1 VIEW)   Final Result         1.  No acute cardiopulmonary disease.   2.  Atherosclerosis      DX-HUMERUS 2+ RIGHT   Final Result         1.  No acute traumatic bony injury.      DX-HIP-UNILATERAL-WITH PELVIS-1 VIEW RIGHT   Final Result         1.  No radiographic evidence of acute traumatic injury.   2.  Atherosclerosis            COURSE & MEDICAL DECISION MAKING    ED Observation Status? No; Patient does not meet criteria for ED Observation.     INITIAL ASSESSMENT, COURSE AND PLAN  Care Narrative: Patient presents emergency department with a ground-level fall possible syncope several days ago he is got multiple pressure wounds will evaluate for rhabdomyolysis acute renal failure severe dehydration or infectious process.  He will be treated with IV fluid resuscitation.  We will also perform a head CT as he did have head trauma.  Thankfully his neurologic exam is " nonfocal beyond some mild confusion.    DISPOSITION AND DISCUSSIONS  9:23 PM  Patient's he became came back greater than 2000 another liter of IV fluids was ordered and still pending his head imaging.      11:36 PM  Spoke w Dr Maza with the medicine service secondary to rhabdomyolysis there is no evidence of fracture or traumatic injury to the head.  He is receiving IV fluid resuscitation he is starting to make good urine output.Dr. Maza has accepted the patient    HYDRATION: Based on the patient's presentation of Other rhabdomyolysis the patient was given IV fluids. IV Hydration was used because oral hydration was not as rapid as required. Upon recheck following hydration, the patient was improving.    I have discussed management of the patient with the following physicians and CHRISSY's: Dr. Maza    Discussion of management with other [facility] or appropriate source(s): None       FINAL DIAGNOSIS  1. Traumatic rhabdomyolysis, initial encounter (HCC)    2.  Fall versus syncope  3.  CHRISTINA       Electronically signed by: Lacey Medellin M.D., 8/25/2023 8:18 PM

## 2023-08-26 NOTE — ASSESSMENT & PLAN NOTE
MARKUSK has decreased  PT OT, niece agreeable to [facility]  8/29/2023:  -Resolved

## 2023-08-26 NOTE — ED NOTES
BREAK RN: pt transported to floor with transport staff. All belongings and chart transported with pt.

## 2023-08-26 NOTE — CARE PLAN
The patient is Watcher - Medium risk of patient condition declining or worsening    Shift Goals  Clinical Goals: wound care and speech eval  Patient Goals: sleep    Progress made toward(s) clinical / shift goals:  Patient was oriented x4 with moments of confusion and repetitiveness. RN addressed wound issue and ordered wound consult. Patient stayed on maintenance fluid. Bed alamrs on. Patient remained fall free after till 7am     Patient is not progressing towards the following goals:      Problem: Knowledge Deficit - Standard  Goal: Patient and family/care givers will demonstrate understanding of plan of care, disease process/condition, diagnostic tests and medications  8/26/2023 0628 by Hugo Maza, R.N.  Outcome: Not Progressing  8/26/2023 0410 by Hugo Maza, R.N.  Outcome: Progressing

## 2023-08-26 NOTE — PROGRESS NOTES
4 Eyes Skin Assessment Completed by Hugo BARAHONA and Sanjana BARAHONA.    Head R. Cheek rash, Redness, Abrasion  Ears WDL  Nose WDL  Mouth Missing teeth  Neck Scars and rashes, redness  Breast/Chest Redness, Abrasion, Blanching, and Scab  Shoulder Blades Redness, Blanching   Spine Redness, Blanching  (R) Arm/Elbow/Hand Redness, Blanching, Bruising, Abrasion, Scab, Swelling, Rash, and Edema  (L) Arm/Elbow/Hand Redness, Abrasion, Scab, and Rash  Abdomen Redness, Bruising  Groin WDL  Scrotum/Coccyx/Buttocks Redness, Edema, Blanching   (R) Leg Redness, Rash, Scar, Scab, Bruising, and Edema  (L) Leg Redness, Blanching, Rash, Scar, and Abrasion  (R) Heel/Foot/Toe Redness and Discoloration  (L) Heel/Foot/Toe Redness and Discoloration          Devices In Places Condom Cath      Interventions In Place Heel Mepilex, Sacral Mepilex, Heel Float Boots, Waffle Overlay, Optifoam, Q2 Turns, Barrier Cream, and Heels Loaded W/Pillows    Possible Skin Injury Yes    Pictures Uploaded Into Epic Yes  Wound Consult Placed Yes  RN Wound Prevention Protocol Ordered Yes

## 2023-08-26 NOTE — ASSESSMENT & PLAN NOTE
X-ray was negative for fracture  Increased swelling, Doppler ordered to assess for DVT  Supportive wound care

## 2023-08-26 NOTE — ASSESSMENT & PLAN NOTE
EKG showing new onset left bundle branch block, not seen in this previous EKG in October 2022  TTE pending

## 2023-08-26 NOTE — ASSESSMENT & PLAN NOTE
Chronically elevated PSA, assumed prostate cancer and followed by urologist  Keep Mejía catheter in place, will need to follow-up with urology

## 2023-08-26 NOTE — ED NOTES
Med Rec complete per patient  No oral antibiotics in the last 30 days  Allergies reviewed  Preferred Pharmacy: [facility]  Patient unsure when he took his medications last

## 2023-08-26 NOTE — H&P
Hospital Medicine History & Physical Note    Date of Service  8/26/2023    Primary Care Physician  Riley Nunez fercho, COLEEN    Consultants  None    Code Status  Full Code    Chief Complaint  Chief Complaint   Patient presents with    GLF       History of Presenting Illness  Moshe Guerrero is a 87 y.o. male who presented 8/25/2023 with generalized weakness.  History taken by chart review as patient unable to provide reliable history at this time.  Patient last known to be well on August 28.  Patient was visited by family today and was found down on the ground and confused.  He was found to have several areas of skin breakdown and skin tears on the right side of his body.  He was then taken to ER for further evaluation.    At ER, patient found to be hypertensive.  Pertinent labs include sodium 148, potassium 3.5, prerenal azotemia, CPK over 2000.  CT head showed no acute intracranial abnormality.      I discussed the plan of care with patient.    Review of Systems  ROS  Unable to assess due to altered mental status  Past Medical History   has a past medical history of Arthritis, Dental disorder, Heart burn, High cholesterol, Hyperlipidemia, and Macular degeneration of both eyes.    Surgical History   has a past surgical history that includes hip replacement, total (Left); hernia repair (Bilateral); and pr total hip arthroplasty (Right, 11/1/2022).     Family History  family history is not on file.   Family history reviewed with patient. There is no family history that is pertinent to the chief complaint.     Social History   reports that he quit smoking about 20 years ago. His smoking use included cigarettes. He started smoking about 30 years ago. He has a 30.0 pack-year smoking history. He has never used smokeless tobacco. He reports current alcohol use. He reports that he does not use drugs.    Allergies  No Known Allergies    Medications  Prior to Admission Medications   Prescriptions Last Dose  Informant Patient Reported? Taking?   Cholecalciferol (D3-50 PO) UNK at UNK Patient Yes No   Sig: Take 1 Tablet by mouth every day.   Multiple Vitamins-Minerals (CENTRUM ADULTS PO) UNK at UNK Patient Yes No   Sig: Take 1 Tablet by mouth every day.   aspirin 81 MG EC tablet UNK at UNK Patient Yes Yes   Sig: Take 81 mg by mouth every day.   omeprazole (PRILOSEC) 20 MG delayed-release capsule UNK at UNK Patient Yes No   Sig: Take 20 mg by mouth every day.   simvastatin (ZOCOR) 80 MG tablet UNK at UNK Patient No No   Sig: TAKE ONE TABLET BY MOUTH AT BEDTIME   Patient taking differently: Take 80 mg by mouth at bedtime.      Facility-Administered Medications: None       Physical Exam  Temp:  [36.7 °C (98.1 °F)] 36.7 °C (98.1 °F)  Pulse:  [60-70] 66  Resp:  [13-26] 18  BP: (144-184)/(63-80) 166/63  SpO2:  [92 %-96 %] 92 %  Blood Pressure : (!) 166/63   Temperature: 36.7 °C (98.1 °F)   Pulse: 66   Respiration: 18   Pulse Oximetry: 92 %       Physical Exam  Constitutional:       Appearance: Normal appearance. He is normal weight.      Comments: Altered, responding to questions inappropriately  Poor overall hygiene   HENT:      Head: Normocephalic.      Nose: Nose normal.      Mouth/Throat:      Mouth: Mucous membranes are moist.   Eyes:      Pupils: Pupils are equal, round, and reactive to light.   Cardiovascular:      Rate and Rhythm: Normal rate and regular rhythm.      Pulses: Normal pulses.   Pulmonary:      Effort: Pulmonary effort is normal.      Breath sounds: Normal breath sounds.   Abdominal:      General: Abdomen is flat.      Palpations: Abdomen is soft.   Musculoskeletal:      Cervical back: Neck supple.   Skin:     Comments: Noted to have multiple areas of skin tear and skin breakdown, particularly on the right side of his body   Neurological:      Mental Status: He is disoriented.         Laboratory:  Recent Labs     08/25/23 1919   WBC 6.7   RBC 5.48   HEMOGLOBIN 14.6   HEMATOCRIT 44.9   MCV 81.9   MCH 26.6*  "  MCHC 32.5   RDW 47.9   PLATELETCT 187   MPV 9.2     Recent Labs     08/25/23 1919   SODIUM 148*   POTASSIUM 3.5*   CHLORIDE 109   CO2 20   GLUCOSE 120*   BUN 40*   CREATININE 0.69   CALCIUM 8.7     Recent Labs     08/25/23 1919   ALTSGPT 38   ASTSGOT 90*   ALKPHOSPHAT 68   TBILIRUBIN 0.6   GLUCOSE 120*     Recent Labs     08/25/23 1919   APTT 25.3   INR 1.09     No results for input(s): \"NTPROBNP\" in the last 72 hours.      Recent Labs     08/25/23 1919   TROPONINT 42*       Imaging:  CT-HEAD W/O   Final Result         1.  No acute intracranial abnormality is identified, there are nonspecific white matter changes, commonly associated with small vessel ischemic disease.  Associated mild cerebral atrophy is noted.   2.  Bilateral maxillary, ethmoid, and frontal sinusitis changes   3.  Atherosclerosis.         DX-CHEST-PORTABLE (1 VIEW)   Final Result         1.  No acute cardiopulmonary disease.   2.  Atherosclerosis      DX-HUMERUS 2+ RIGHT   Final Result         1.  No acute traumatic bony injury.      DX-HIP-UNILATERAL-WITH PELVIS-1 VIEW RIGHT   Final Result         1.  No radiographic evidence of acute traumatic injury.   2.  Atherosclerosis          EKG:  I have personally reviewed the images and compared with prior images.    Assessment/Plan:  Justification for Admission Status  I anticipate this patient will require at least two midnights for appropriate medical management, necessitating inpatient admission because he has traumatic rhabdomyolysis    Patient will need a Med/Surg bed on MEDICAL service .  The need is secondary to traumatic rhabdomyolysis.    * Traumatic rhabdomyolysis, initial encounter (HCC)- (present on admission)  Assessment & Plan  Spoke with ERP.  Patient appears to be altered and unable to provide reliable history.  Patient found down on ground by family, unknown how long he was down.  Labs significant for severe dehydration with prerenal azotemia, hypernatremia.  In addition, he has " CPK over 2000.  On physical exam he has multiple area of skin breakdown.  Patient is able to tell me that he lives by himself.  It is clear that he is unable to take care of himself without any assistance.  Patient will be monitored overnight with aggressive fluid resuscitation, trending of CPK.  Monitor for fluid overload.  PT OT    ACP (advance care planning)  Assessment & Plan  Attempted to reach family members listed on contacts for more information and to ask about CODE STATUS.  I do not believe the patient is able to have a conversation with me about CODE STATUS for now.  He will be left as full code for now    LBBB (left bundle branch block)  Assessment & Plan  EKG showing new onset left bundle branch block, not seen in this previous EKG in October 2022  Will need follow-up with cardiology    Hyperlipidemia  Assessment & Plan  Continue Zocor        VTE prophylaxis: heparin ppx

## 2023-08-26 NOTE — ED NOTES
Bedside report received from BROOKLYN Tarango. Pt is arousable to voice and orientedx4. Pt on cardiac monitor, pulse ox, and automatic BP. VSS, saturating above 95% on RA, SR in the 60s. Call light within reach and pt updated on POC.

## 2023-08-26 NOTE — PROGRESS NOTES
RN called and updated patient's relative Deidre. Deidre stated that patient was independent, AOX4, with minimal medical health issues. Deidre checked on patient every few days. She and her  were out of town so they couldn't check on the patient and he could have fall during that time. RN relayed information to day RN

## 2023-08-26 NOTE — ED TRIAGE NOTES
Chief Complaint   Patient presents with    GLF   Pt found down by family today, LKW by family is Sunday 8/20. Pt had GLF d/t dizziness. Pt unsure as to how long he has been down for. +LOC, +Head trauma, +81 ASA. EMS gave amp of Sodium Bicarb, 1L NS, and 75mcg fentanyl. FSBG 134. Pt has diffuse skin tears and abrasions primarily to R side.    Pt brought in by [facility] from Home to [place] [id number]. Pt in a gown and on monitor. Chart flagged for ERP to see.

## 2023-08-26 NOTE — PROGRESS NOTES
Hospital Medicine Daily Progress Note    Date of Service  8/26/2023    Chief Complaint  Moshe Guerrero is a 87 y.o. male admitted 8/25/2023 with fall/syncope    Hospital Course  Moshe Guerrero is a 87 y.o. male who presented 8/25/2023 with generalized weakness.  History taken by chart review as patient unable to provide reliable history at this time.  Patient last known to be well on August 28.  Patient was visited by family today and was found down on the ground and confused.  He was found to have several areas of skin breakdown and skin tears on the right side of his body.  He was then taken to ER for further evaluation.     At ER, patient found to be hypertensive.  Pertinent labs include sodium 148, potassium 3.5, prerenal azotemia, CPK over 2000.  CT head showed no acute intracranial abnormality.    Interval Problem Update  8/26 patient is in bed, he is still slightly confused and slow in answering questions, patient follows commands, patient is not able to urinate, Mejía catheter has been ordered, patient denies any chest pain, patient does not recall what happened or how he fell, patient CT head did not show any acute finding, patient at this time appears to be in pain, his pain medication has been adjusted, I have ordered D-dimer troponin and EKG, D-dimer was elevated and I have ordered CTA chest, I have ordered echocardiogram, will continue trending troponin, will likely transfer to telemetry for higher level of care and cardiac monitoring, CT maxillofacial ordered    I have discussed this patient's plan of care and discharge plan at IDT rounds today with Case Management, Nursing, Nursing leadership, and other members of the IDT team.    Consultants/Specialty  na    Code Status  Full Code    Disposition  The patient is not medically cleared for discharge to home or a post-acute facility.      I have placed the appropriate orders for post-discharge needs.    Review of Systems  Review of Systems    Constitutional:  Negative for chills and fever.   HENT:  Negative for congestion.    Eyes:  Negative for blurred vision and double vision.   Respiratory:  Negative for cough, hemoptysis and wheezing.    Cardiovascular:  Negative for chest pain, palpitations, claudication, leg swelling and PND.   Gastrointestinal:  Negative for heartburn, nausea and vomiting.   Genitourinary:  Negative for hematuria and urgency.   Musculoskeletal:  Positive for falls. Negative for back pain and myalgias.        Shoulder and hand pain right-sided   Skin:  Negative for rash.   Neurological:  Negative for dizziness and headaches.   Endo/Heme/Allergies:  Does not bruise/bleed easily.   Psychiatric/Behavioral:  Negative for depression.         Physical Exam  Temp:  [35.9 °C (96.7 °F)-36.7 °C (98.1 °F)] 36.2 °C (97.2 °F)  Pulse:  [56-70] 63  Resp:  [13-26] 16  BP: (144-184)/(52-80) 172/70  SpO2:  [92 %-100 %] 100 %    Physical Exam  Vitals and nursing note reviewed.   Constitutional:       General: He is in acute distress.      Appearance: Normal appearance. He is ill-appearing.   HENT:      Head: Normocephalic.      Comments: Right-sided cheek swelling and bruises.     Nose: Nose normal.      Mouth/Throat:      Pharynx: No oropharyngeal exudate or posterior oropharyngeal erythema.   Eyes:      General: No scleral icterus.        Right eye: No discharge.         Left eye: No discharge.      Conjunctiva/sclera: Conjunctivae normal.   Cardiovascular:      Rate and Rhythm: Normal rate and regular rhythm.      Pulses: Normal pulses.      Heart sounds: Normal heart sounds.   Pulmonary:      Effort: Pulmonary effort is normal. No respiratory distress.      Breath sounds: Normal breath sounds. No wheezing.   Abdominal:      General: Bowel sounds are normal. There is no distension.      Tenderness: There is no abdominal tenderness. There is no guarding.   Musculoskeletal:         General: Normal range of motion.      Cervical back: Normal range  of motion and neck supple.      Right lower leg: No edema.      Left lower leg: No edema.      Comments: Right shoulder tenderness, right hand swelling   Skin:     General: Skin is warm.      Capillary Refill: Capillary refill takes less than 2 seconds.      Findings: Bruising present.   Neurological:      Mental Status: He is alert and oriented to person, place, and time.      Cranial Nerves: No cranial nerve deficit.      Motor: No weakness.   Psychiatric:         Mood and Affect: Mood normal.         Behavior: Behavior normal.         Fluids    Intake/Output Summary (Last 24 hours) at 8/26/2023 1401  Last data filed at 8/26/2023 0210  Gross per 24 hour   Intake --   Output 100 ml   Net -100 ml       Laboratory  Recent Labs     08/25/23 1919   WBC 6.7   RBC 5.48   HEMOGLOBIN 14.6   HEMATOCRIT 44.9   MCV 81.9   MCH 26.6*   MCHC 32.5   RDW 47.9   PLATELETCT 187   MPV 9.2     Recent Labs     08/25/23 1919 08/26/23  1230   SODIUM 148* 150*   POTASSIUM 3.5* 3.3*   CHLORIDE 109 114*   CO2 20 23   GLUCOSE 120* 86   BUN 40* 31*   CREATININE 0.69 0.63   CALCIUM 8.7 8.3*     Recent Labs     08/25/23 1919   APTT 25.3   INR 1.09               Imaging  CT-MAXILLOFACIAL W/O PLUS RECONS   Final Result         No acute maxillofacial fracture.      Mild mucosal thickening in bilateral maxillary sinuses, left more than right. Layering air-fluid levels in the right maxillary sinus.      Partial opacification of the right mastoid air cells.      DX-HAND 3+ RIGHT   Final Result      1.  No evidence of fracture or dislocation.      2.  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      3.  Joint surface irregularities and bone erosions are also present which could indicate erosive or inflammatory arthropathy.      CT-HEAD W/O   Final Result         1.  No acute intracranial abnormality is identified, there are nonspecific white matter changes, commonly associated with small vessel ischemic disease.  Associated mild cerebral atrophy is  noted.   2.  Bilateral maxillary, ethmoid, and frontal sinusitis changes   3.  Atherosclerosis.         DX-CHEST-PORTABLE (1 VIEW)   Final Result         1.  No acute cardiopulmonary disease.   2.  Atherosclerosis      DX-HUMERUS 2+ RIGHT   Final Result         1.  No acute traumatic bony injury.      DX-HIP-UNILATERAL-WITH PELVIS-1 VIEW RIGHT   Final Result         1.  No radiographic evidence of acute traumatic injury.   2.  Atherosclerosis      EC-ECHOCARDIOGRAM COMPLETE W/O CONT    (Results Pending)   CT-CTA CHEST PULMONARY ARTERY W/ RECONS    (Results Pending)        Assessment/Plan  * Traumatic rhabdomyolysis, initial encounter (HCC)- (present on admission)  Assessment & Plan  Continue IV hydration  Follow-up CPK in a.m.  Will need PT OT when more stable  Monitoring kidney function      Urinary retention  Assessment & Plan  Patient with elevated PSA  I have ordered Mejía catheter placement.    Facial trauma- (present on admission)  Assessment & Plan  Unclear etiology, patient with mild elevated troponin  I have ordered and reviewed D-dimer which is elevated  I have ordered CTA chest  Have ordered echocardiogram  Continue trending troponin  Will consider transfer to telemetry    Hypophosphatemia- (present on admission)  Assessment & Plan  Start replacing with oral K-Phos  Follow-up phosphorus in a.m.    Hypokalemia- (present on admission)  Assessment & Plan  Replacing  Follow-up magnesium levels    Hyponatremia- (present on admission)  Assessment & Plan  Will switch IV fluids to LR  Likely due to decreased oral intake and IV fluids with normal saline  Continue trending sodium every 8 hours    Swelling of right hand- (present on admission)  Assessment & Plan  I have ordered x-ray of his hand    Elevated troponin- (present on admission)  Assessment & Plan  Initial troponin 42, I have ordered repeat troponin this is 58  We will continue trending troponin  Repeat EKG  I have ordered echocardiogram      ACP (advance  care planning)  Assessment & Plan  Full code    LBBB (left bundle branch block)  Assessment & Plan  EKG showing new onset left bundle branch block, not seen in this previous EKG in October 2022  Will need follow-up with cardiology    Will get echocardiogram  Continue trending troponin      Hyperlipidemia  Assessment & Plan  Holding statin due to rhabdomyolysis  Continue monitoring         VTE prophylaxis: Heparin          I have performed a physical exam and reviewed and updated ROS and Plan today (8/26/2023). In review of yesterday's note (8/25/2023), there are no changes except as documented above.    Greater than 55 minutes spent prepping to see patient (e.g. review of tests) obtaining and/or reviewing separately obtained history. Performing a medically appropriate examination and/ evaluation.  Counseling and educating the patient/family/caregiver.  Ordering medications, tests, or procedures.  Referring and communicating with other health care professionals.  Documenting clinical information in EPIC.  Independently interpreting results and communicating results to patient/family/caregiver.  Care coordination.    I spent extra 11 minutes with patient, I have reviewed cta chest, discussed with patient, discussed with radiologist, cta positive for PE, will start iv heparin, close monitoring, ct head did not show bleeding, will get US lower ext. F/u echo. Will transfer to tele for close monitoring.

## 2023-08-26 NOTE — ED NOTES
Bedside report to BROOKLYN Nelson. All questions answered. Pt breathing with even, unlabored respirations on RA at time of report.

## 2023-08-27 PROBLEM — E16.2 HYPOGLYCEMIA: Status: ACTIVE | Noted: 2023-01-01

## 2023-08-27 PROBLEM — I26.99 PULMONARY EMBOLI (HCC): Status: ACTIVE | Noted: 2023-01-01

## 2023-08-27 NOTE — THERAPY
"Speech Language Pathology   Clinical Swallow Evaluation     Patient Name: Moshe Guerrero  AGE:  87 y.o., SEX:  male  Medical Record #: [id number]  Date of Service: 8/27/2023      History of Present Illness  86 y/o male presented 8/25 with generalized weakness.  Found down by family with several areas of skin breakdown and skin tears on the right side of his body. Found to be hypertensive with abnormal labs.    No hx SLP in Epic.    CMHx: Traumatic rhabdo, HLD, LBBB, hyponatremia, hypokalemia, hypophosphatemia, facial trauma, urinary retention  PMHx: Chronic pain of R knee, osteoarthritis of R hip    CT/CTA Chest/Pulm 8/26:  \"1.  Exam is positive for isolated pulmonary embolism segmental and subsegmental segmental arteries right lower pulmonary lobe. Minimal subsegmental pulmonary embolism is noted left lower lobe.  2.  Minimal opacification of volume loss posterior lung bases could be due to atelectasis or fibrosis.  3.  Prominent size of pulmonary arteries could indicate pulmonary hypertension.  4.  Emphysema.  5.  Calcific atherosclerosis.\"    General Information:  Vitals  O2 Delivery Device: None - Room Air  Level of Consciousness: Awake  Patient Behaviors: Confused  Orientation: Self, General place, Current month, Current year.  When asked his birthday, he answered \"Orozco\" when asked \"Is that your middle name\", pt responded \"Well I was born on the [birth year]th day.\" Stated he was born in \"1975.\"  Follows Directives: Inconsistent      Prior Living Situation & Level of Function:  Lives with - Patient's Self Care Capacity: Alone and Unable to Care For Self  Communication: Unclear PLOF  Swallowing: Unclear PLOF - pt reports WFL       Oral Mechanism Evaluation:  Dentition: Fair, Upper partial (Possible lower partial, missing L side of lower dentition)   Facial Symmetry: Pt did not follow commands to assess  Facial Sensation: Pt did not follow commands to assess     Labial Observations: Open mouth posture   Lingual " "Observations: Xerostomia; Unable to complete assessment - \"I couldn't move my tongue if you paid me.\"  Motor Speech: Fair            Laryngeal Function:  Secretion Management: Adequate  Voice Quality: Hoarse  Cough: Perceptually WNL  Comments: Did not cough without PO trials, stated he was \"too dry.\" Cued cough after ice chips perceputally wet.      Subjective  Pt agreeable and somewhat cooperative with SLP evaluation tasks. Confused. \"I just need some water.\"      Assessment  Current Method of Nutrition: NPO until cleared by speech pathology  Positioning: Raymond's (60-90 degrees)  Bolus Administration: SLP    O2 Delivery Device: None - Room Air  Factor(s) Affecting Performance: Impaired mental status, Impaired command following  Tracheostomy : No        Swallowing Trials:  Ice: Impaired  Thin Liquid (TN0): Impaired  Liquidised (LQ3): WFL  Easy to Chew (EC7): Impaired      Comments:   Following ice chip and thin liquid trials, pt with notable increase in nasality and vocal congestion that resolved with cued throat clear. No spontaneous cough/clear throughout. Complete AP transit and appropriate containment/stripping with LQ3. Pt did not bite trial of SC7; when small piece was provided, pt did not masticate until told to do so by SLP. Mastication disorganized, which may be baseline related to dentition.    Pt and niece agreeable to PO diet at this time with follow-up FEES given concern for airway invasion re change in vocal quality with thin liquids.      Clinical Impressions  Pt presents with indicators of possible oropharyngeal dysphagia and would benefit from further evaluation with use of FEES. However, do suspect that risk of ongoing NPO status may be higher given AMS, need for wound healing, and notable xerostomia. Recommend MM/TN 1:1 until FEES can be completed. Swallow outcomes can be further maximized with use of frequent, thorough oral care and mobilization as pt is able.       Recommendations  Minced and " "moist solids with thin liquids  Instrumentation: FEES  Medication: Whole with puree  Supervision: 1:1 feeding with constant supervision, Careful 1:1 hand feeding  Positioning: Fully upright and midline during oral intake  Risk Management : Small bites/sips, Slow rate of intake (Periodic cough/clear with thins)  Oral Care: Pre- and post-meals         SLP Treatment Plan  Treatment Plan: Dysphagia Treatment, Patient/Family/Caregiver Training  SLP Frequency: 4x Per Week  Estimated Duration: Until Therapy Goals Met      Anticipated Discharge Needs  Discharge Recommendations: Recommend home health for continued speech therapy services (changes pending fees)   Therapy Recommendations Upon DC: Dysphagia Training, Patient / Family / Caregiver Education, Community Re-Integration        Patient / Family Goals  Patient / Family Goal #1: \"I just need water.\"  Short Term Goals  Short Term Goal # 1: Pt will complete FEES w SLP to further evaluate swallow function and inform POC.      Aidee Newman, SLP   "

## 2023-08-27 NOTE — ASSESSMENT & PLAN NOTE
Pulmonary emboli on CT  Continue on heparin drip  Follow-up LE Dopplers and TTE, if normal can transition to oral anticoagulation  8/29/2023:  Lower extremity Dopplers upper extremity Dopplers negative have discontinued heparin infusion transition patient to Eliquis 10 mg twice daily for 7 days then 5 mg twice daily thereafter.

## 2023-08-27 NOTE — PROGRESS NOTES
NOC Cross Cover Hospitalist Progress Note:    RRT called due to altered mentatio. On assessment, patient noted to be A/O x1, blood sugars at 65. Per protocol, patient given half an amp of D50, recheck only brought blood sugars to 75. Remaiing half amp of D50 was given to the patient.     Patient admitted due to being found down, noted severe rhabdocytosis, hypernatremia, multiple wounds. Also found to have bilateral  PE.     Continue per protocol on blood sugar checks. Ordered updated CBC and CMP.     ================================================================  Please note that this dictation was created using voice recognition software. I have made every reasonable attempt to correct obvious errors, but there may be errors of grammar and possibly content that I did not discover before finalizing the note.    Electronically signed by:  Dr. TEOFILO Hawkins, DNP, APRN, FNP-C  Hospitalist Services  [facility]  [phone]  [email or web address]

## 2023-08-27 NOTE — ASSESSMENT & PLAN NOTE
Has not had recurring hypoglycemia.  Speech has started him on a modified diet.  Hold D5 infusion and trend glucose level

## 2023-08-27 NOTE — PROGRESS NOTES
Pt transferred to unit at approx 1800. Upon assessment pt confused A&Ox1 and pupils found to be pinpoint and nonreactive. Skin check preformed with Tayler BARAHONA. Charge RN and rapid team notified. VSS but blood glucose found to be 66. Dextrose ordered emergently.

## 2023-08-27 NOTE — PROGRESS NOTES
Pt. Was awake and oriented x3 this am, he was able to swallow water with pills, one at a time, sitting upright, no problems swallowing.  Pain med given Oxy 5mg for 5/10 pain in rt. Shoulder.  Xray taken of rt. Hand, pt. Taken to CT scan for pulmonary Artery, to r/o PE.  Pt. Was positive for PE, orders placed to transfer pt. To Tele, report given, flex RN at pt.'s bedside to transfer pt. And start IV.

## 2023-08-27 NOTE — PROGRESS NOTES
Bedside report received from night shift RN. Assumed care at 0700. Pt is A&Ox4. Pt is in bed. Pt denies pain at this time. Pt was updated on plan of care. Pt has call light, personal belongings, and bedside table within reach. Bed is in the lowest position and bed alarm is on.

## 2023-08-27 NOTE — PROGRESS NOTES
Hospital Medicine Daily Progress Note    Date of Service  8/27/2023    Chief Complaint  Moshe Guerrero is a 87 y.o. male admitted 8/25/2023 with fall    Hospital Course  88 yo man with HLD who was found down by family, last seen well 8/20. Patient was unable to recall what happened. He was found with traumatic rhabdomyolysis. Mejía cath was placed for urinary retention. He had CTA chest positive for PE and started on heparin drip.    Interval Problem Update  Hypoglycemic overnight to 65, corrected to 76  Hypernatremic 146    Renal function is good  HypoK 3.1, replete  LE doppler pending, TTE pending  NPO, speech pending  PTOT pending  Patient says his both his shoulders and knees hurt.  He does not remember much about the fall.  He says he has been lightheaded but does not know for how long.  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  Says his knees checks in on him.  His eyes have been burning, denies itching or changes in vision, no tearing or drainage.  I updated his niece Lina, cares for him.  She is his POA and has paperwork that indicates he is DO NOT RESUSCITATE.  She is agreeable to having him go to SNF, she plans to have him move in with her instead of continuing to live alone.    I have discussed this patient's plan of care and discharge plan at IDT rounds today with Case Management, Nursing, Nursing leadership, and other members of the IDT team.    Consultants/Specialty  none    Code Status  DNAR/DNI    Disposition  The patient is not medically cleared for discharge to home or a post-acute facility.  Anticipate discharge to: skilled nursing facility    I have placed the appropriate orders for post-discharge needs.    Review of Systems  Review of Systems   Constitutional:  Positive for malaise/fatigue.   Eyes:  Positive for pain. Negative for discharge.        Legally blind per patient   Respiratory:  Negative for shortness of breath.    Cardiovascular:  Negative for chest pain.    Gastrointestinal:  Negative for abdominal pain and nausea.   Musculoskeletal:  Positive for joint pain and myalgias.   Neurological:  Positive for weakness. Negative for dizziness and headaches.   Psychiatric/Behavioral:  Positive for memory loss.         Physical Exam  Temp:  [35.8 °C (96.5 °F)-36.6 °C (97.9 °F)] 36.6 °C (97.9 °F)  Pulse:  [51-68] 66  Resp:  [11-18] 18  BP: (135-158)/(60-80) 151/69  SpO2:  [92 %-99 %] 93 %    Physical Exam  Vitals and nursing note reviewed.   Constitutional:       Appearance: He is ill-appearing.   HENT:      Head:      Comments: Ecchymosis and dried blood to right cheek, lower legs     Mouth/Throat:      Mouth: Mucous membranes are moist.   Eyes:      General:         Right eye: No discharge.         Left eye: No discharge.   Cardiovascular:      Rate and Rhythm: Normal rate and regular rhythm.   Pulmonary:      Effort: Pulmonary effort is normal. No respiratory distress.      Breath sounds: No wheezing or rales.   Abdominal:      Palpations: Abdomen is soft.      Tenderness: There is no abdominal tenderness.   Musculoskeletal:      Cervical back: Neck supple.      Comments: Right hand is swollen with multiple blisters  Generalized right shoulder tenderness, pain with range of motion, no significant swelling palpated  Diffuse muscle atrophy   Skin:     General: Skin is warm and dry.   Neurological:      Mental Status: He is alert.      Comments: Oriented to self, [place], year.  Disoriented to month  Able to move all extremities         Fluids    Intake/Output Summary (Last 24 hours) at 8/27/2023 1447  Last data filed at 8/27/2023 0049  Gross per 24 hour   Intake 2969 ml   Output 800 ml   Net 2169 ml       Laboratory  Recent Labs     08/25/23  1919 08/26/23  1732 08/27/23  0107   WBC 6.7 4.6* 4.8   RBC 5.48 5.00 4.94   HEMOGLOBIN 14.6 13.2* 13.0*   HEMATOCRIT 44.9 42.2 41.7*   MCV 81.9 84.4 84.4   MCH 26.6* 26.4* 26.3*   MCHC 32.5 31.3* 31.2*   RDW 47.9 50.2* 49.8   PLATELETCT  187 141* 131*   MPV 9.2 9.9 9.2     Recent Labs     08/26/23 2028 08/27/23  0108 08/27/23  1345   SODIUM 146* 146* 143   POTASSIUM 3.6 3.1* 3.1*   CHLORIDE 111 114* 110   CO2 20 20 23   GLUCOSE 167* 145* 129*   BUN 29* 25* 20   CREATININE 0.54 0.46* 0.44*   CALCIUM 8.2* 8.2* 7.8*     Recent Labs     08/25/23  1919 08/26/23  1732   APTT 25.3 28.0   INR 1.09 1.12               Imaging  DX-SHOULDER 2+ RIGHT   Final Result      1.  No acute fracture or dislocation of the right shoulder.   2.  There is moderate generative changes throughout the right shoulder.      CT-CTA CHEST PULMONARY ARTERY W/ RECONS   Final Result      1.  Exam is positive for isolated pulmonary embolism segmental and subsegmental segmental arteries right lower pulmonary lobe. Minimal subsegmental pulmonary embolism is noted left lower lobe.      2.  Minimal opacification of volume loss posterior lung bases could be due to atelectasis or fibrosis.      3.  Prominent size of pulmonary arteries could indicate pulmonary hypertension.      4.  Emphysema.      5.  Calcific atherosclerosis.      6.  These findings were communicated with SANDRITA OSBORNE on 08/26/2023.            CT-MAXILLOFACIAL W/O PLUS RECONS   Final Result         No acute maxillofacial fracture.      Mild mucosal thickening in bilateral maxillary sinuses, left more than right. Layering air-fluid levels in the right maxillary sinus.      Partial opacification of the right mastoid air cells.      DX-HAND 3+ RIGHT   Final Result      1.  No evidence of fracture or dislocation.      2.  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      3.  Joint surface irregularities and bone erosions are also present which could indicate erosive or inflammatory arthropathy.      CT-HEAD W/O   Final Result         1.  No acute intracranial abnormality is identified, there are nonspecific white matter changes, commonly associated with small vessel ischemic disease.  Associated mild cerebral atrophy is  noted.   2.  Bilateral maxillary, ethmoid, and frontal sinusitis changes   3.  Atherosclerosis.         DX-CHEST-PORTABLE (1 VIEW)   Final Result         1.  No acute cardiopulmonary disease.   2.  Atherosclerosis      DX-HUMERUS 2+ RIGHT   Final Result         1.  No acute traumatic bony injury.      DX-HIP-UNILATERAL-WITH PELVIS-1 VIEW RIGHT   Final Result         1.  No radiographic evidence of acute traumatic injury.   2.  Atherosclerosis      EC-ECHOCARDIOGRAM COMPLETE W/O CONT    (Results Pending)   US-EXTREMITY VENOUS LOWER BILAT    (Results Pending)        Assessment/Plan  * Traumatic rhabdomyolysis, initial encounter (HCC)- (present on admission)  Assessment & Plan  CPK decreasing with IV hydration  PT OT      Hypoglycemia  Assessment & Plan  Not been eating well  Speech to evaluate his swallowing  Started on D5 infusion, continue to monitor glucose levels    Pulmonary emboli (HCC)  Assessment & Plan  Pulmonary emboli on CT  Started on heparin  Follow-up LE Dopplers and TTE    Urinary retention  Assessment & Plan  Patient with elevated PSA  Mejía cath placed, will need to follow-up with urology    Facial trauma- (present on admission)  Assessment & Plan  CT maxillofacial is negative for fracture    Hypophosphatemia- (present on admission)  Assessment & Plan  Recheck level    Hypokalemia- (present on admission)  Assessment & Plan  Continue replacing and recheck level    Hyponatremia- (present on admission)  Assessment & Plan  Hypernatremic and has not been eating well, dehydrated  Started on IV fluids and trend level    Swelling of right hand- (present on admission)  Assessment & Plan  X-ray was negative for fracture  Elevate to reduce swelling  Supportive wound care    Elevated troponin- (present on admission)  Assessment & Plan  Troponin 42, 58  EKG negative for STEMI.  He denies chest pain.  Does have PE  TTE pending      ACP (advance care planning)  Assessment & Plan  Full code    LBBB (left bundle  branch block)  Assessment & Plan  EKG showing new onset left bundle branch block, not seen in this previous EKG in October 2022  TTE pending      Hyperlipidemia  Assessment & Plan  Holding statin due to rhabdomyolysis         VTE prophylaxis:    therapeutic anticoagulation with weight-based heparin gtt, pharmacy to adjust PRN      I have performed a physical exam and reviewed and updated ROS and Plan today (8/27/2023). In review of yesterday's note (8/26/2023), there are no changes except as documented above.

## 2023-08-27 NOTE — CARE PLAN
The patient is Watcher - Medium risk of patient condition declining or worsening    Shift Goals  Clinical Goals: hypogylcemic control  Patient Goals: rest, comfort  Family Goals: BECKY      Problem: Skin Integrity  Goal: Skin integrity is maintained or improved  Outcome: Progressing  Note: Skin integrity assessed. Wound care provided and wound consult in.      Problem: Fall Risk  Goal: Patient will remain free from falls  Outcome: Progressing  Note: Hourly rounding done on pt. Mejía in place.

## 2023-08-27 NOTE — DIETARY
"Nutrition services: Day 2 of admit.  Moshe Guerrero is an 87 y.o. male with admitting DX of traumatic rhabdomyolysis, initial encounter.    Consult received for unsure wt loss, poor PO per admit screen; BMI <19. Visited pt in room. Pt was sleeping soundly, did not rouse to name. Pt appeared somewhat thin.  Moderate muscle loss evidenced by slight hollow to buccal region, slightly prominent zygomatic arches; moderate muscle loss evidenced by slight depression to temporals. Unsure of pt's baseline subcutaneous fat and muscle storage. Wt has been fairly stable per chart review. Pt may benefit from oral nutrition supplements to bolster nutrition. RD will add supplement order and adjust menu accordingly.     Assessment:  Height: 185.4 cm (6' 1\")  Weight: 62 kg (136 lb 11 oz) - admit bed scale wt  Body mass index is 18.03 kg/m²., BMI classification: underweight  Diet/Intake: L5/L0 (minced and moist meals with thin liquids); no PO recorded in chart to assess    Evaluation:   Admitted after ground level fall.  Pt initially admitted to S6, transferred to Telemetry last night.  PMH: arthritis, heart burn, high cholesterol, hyperlipidemia.   Wt hx per chart review: 142 lbs 7/18/23, 140 lbs 4/11/23. Wt loss of 3% in four months is not significant.    Malnutrition Risk: Pt with physical markers for moderate muscle and moderate fat loss noted, however appear to be related to advanced age. Unable to meet criteria per ASPEN guidelines at this time.     Recommendations/Plan:  Oral nutrition supplements BID (Boost Plus and/or Magic cup).  Encourage intake of meals and supplements.  Document intake of all meals and supplements as % taken in ADLs to provide interdisciplinary communication across all shifts.   Monitor weight.  Nutrition rep will continue to see patient for ongoing meal and snack preferences.     RD will follow.     "

## 2023-08-27 NOTE — PROGRESS NOTES
Rapid Response Summary     Rapid response called at 1940 for: Altered mental status     VS: WDL (See Vitals Flowsheet)  Additional info: new dx of pulmonary embolism  MD Paged: [phone]  Interventions:    Imaging/Tests: N/A   Labs: Blood Glucose    Medications: Dextrose   Other: N/A  Disposition: Improved with rapid response team interventions. Primary RN updated on plan of care. Transfer not indicated at this time.

## 2023-08-27 NOTE — CARE PLAN
Problem: Knowledge Deficit - Standard  Goal: Patient and family/care givers will demonstrate understanding of plan of care, disease process/condition, diagnostic tests and medications  Outcome: Progressing     Problem: Skin Integrity  Goal: Skin integrity is maintained or improved  Outcome: Progressing     Problem: Fall Risk  Goal: Patient will remain free from falls  Outcome: Progressing     Problem: Pain - Standard  Goal: Alleviation of pain or a reduction in pain to the patient’s comfort goal  Outcome: Progressing     Problem: Psychosocial Needs:  Goal: Ability to cope will improve  Outcome: Progressing     Problem: Urinary Elimination:  Goal: Ability to achieve and maintain adequate renal perfusion and functioning will improve  Outcome: Progressing  Goal: Ability to achieve a balanced intake and output will improve  Outcome: Progressing     Problem: Physical Regulation:  Goal: Diagnostic test results will improve  Outcome: Progressing     Problem: Knowledge Deficit:  Goal: Patient's knowledge of the prescribed therapeutic regimen will improve  Outcome: Progressing   The patient is Watcher - Medium risk of patient condition declining or worsening    Shift Goals  Clinical Goals: wound care, pain control,swallow eval  Patient Goals: sleep    Progress made toward(s) clinical / shift goals:  Pt. Was awake and oriented x3 this am, he was able to swallow water with pills, one at a time, sitting upright, no problems swallowing.  Pain med given Oxy 5mg for 5/10 pain in rt. Shoulder.  Xray taken of rt. Hand, pt. Taken to CT scan for pulmonary Artery, to r/o PE.  Pt. Was positive for PE, orders placed to transfer pt. To Tele, report given, flex RN at pt.'s bedside to transfer pt. And start IV.    Patient is not progressing towards the following goals:

## 2023-08-27 NOTE — CARE PLAN
Problem: Knowledge Deficit - Standard  Goal: Patient and family/care givers will demonstrate understanding of plan of care, disease process/condition, diagnostic tests and medications  Outcome: Progressing  Note: Pt's whiteboard is updated, pt has been updated on POC, all questions have been answered at this time       Problem: Skin Integrity  Goal: Skin integrity is maintained or improved  Outcome: Not Progressing  Note: Wound consult placed. Pillows in use for support/positioning, Q2 turns, waffle cushion, sacral mepilex, bilateral heel mepilex, bilateral heel float boots, moisturizer, TAPS system      Problem: Fall Risk  Goal: Patient will remain free from falls  Outcome: Progressing  Note: Bed locked in lowest position. Bed alarm on. Treaded socks in use. Call light and belongings within reach. Pt educated to call for assistance. Pt verbalized understanding. Hourly rounding in place.    The patient is Watcher - Medium risk of patient condition declining or worsening    Shift Goals  Clinical Goals: blood sugar control, ambulation  Patient Goals: rest  Family Goals: BECKY    Progress made toward(s) clinical / shift goals: blood sugar control, skin breakdown prevention, safety     Patient is not progressing towards the following goals: rest, drink water       Problem: Skin Integrity  Goal: Skin integrity is maintained or improved  Outcome: Not Progressing  Note: Wound consult placed. Pillows in use for support/positioning, Q2 turns, waffle cushion, sacral mepilex, bilateral heel mepilex, bilateral heel float boots, moisturizer, TAPS system

## 2023-08-27 NOTE — PROGRESS NOTES
..Hypoglycemia Intervention    Hypoglycemia protocol intervention:  Blood glucose 63 at 1942.  Intervention: 25 g IV dextrose per MAR   Repeat blood glucose 79 at 2005.  Intervention: Patient NPO, recheck blood glucose in 1 hour   Additional interventions needed: yes  Dr. Case notified of the above at 2015. Continued blood sugar monitoring without successfully coming over 90. Reached out to Dr. Sumner gave another dose of D50 per mar. Continuing hourly blood sugar checks per MAR.

## 2023-08-27 NOTE — PROGRESS NOTES
1740: Arrived to higher level in [place] [address]-1, patient alert to self and slow to answer questions. Patient placed on zoll and vital signs obtained. Patient is sinus margareth 50-54, /60, RR 14, 95% on RA.

## 2023-08-27 NOTE — PROGRESS NOTES
RRT initiated during report when pt was found to have altered mentation and pinpoint pupils non reactive to light. BS checked and was 63. Followed hypoglycemic protocol. Pts mentation approved and was alert to voice and less slurred. Charge RN aware.

## 2023-08-28 PROBLEM — E87.0 HYPERNATREMIA: Status: ACTIVE | Noted: 2023-01-01

## 2023-08-28 NOTE — WOUND TEAM
[facility]  Inpatient Services  Initial Wound and Skin Care Evaluation    Admission Date: 2023     Last order of IP CONSULT TO WOUND CARE was found on 2023 from Hospital Encounter on 2023     HPI, PMH, SH: Reviewed    Past Surgical History:   Procedure Laterality Date    DE TOTAL HIP ARTHROPLASTY Right 2022    Procedure: ARTHROPLASTY, HIP, TOTAL, ANTERIOR APPROACH;  Surgeon: Deshawn Del Valle M.D.;  Location: SURGERY [facility];  Service: Orthopedics    HERNIA REPAIR Bilateral     inguinal x3    HIP REPLACEMENT, TOTAL Left      Social History     Tobacco Use    Smoking status: Former     Current packs/day: 0.00     Average packs/day: 3.0 packs/day for 10.0 years (30.0 ttl pk-yrs)     Types: Cigarettes     Start date: 1993     Quit date: 2003     Years since quittin.6    Smokeless tobacco: Never   Substance Use Topics    Alcohol use: Yes     Comment: Occ      Chief Complaint   Patient presents with    GLF     Diagnosis: Traumatic rhabdomyolysis, initial encounter ([facility]) [T79.6XXA]    Unit where seen by Wound Team: T802/00     WOUND CONSULT RELATED TO:  Head to Toe (Primarily to the right side)    WOUND TEAM PLAN OF CARE - Frequency of Follow-up:   Nursing to follow dressing orders written for wound care. Contact wound team if area fails to progress, deteriorates or with any questions/concerns if something comes up before next scheduled follow up (See below as to whether wound is following and frequency of wound follow up)   Weekly - Right cheek, Chest, Hip, Ankle, Bilateral knees and sacrum    WOUND HISTORY:   Pt is an 87yr old gentleman who apparently lives alone and fell and was found down for unknown amount of time in his yard. Pt was found to be in Rhabdomyolysis. Pt was noted to have several pressure injuries primarily to the right side related to being down for extended period of time.       WOUND ASSESSMENT/LDA  Wound 23 Pressure Injury Cheek Right POA  (Active)   Date First Assessed/Time First Assessed: 08/26/23 0200   Present on Original Admission: Yes  Hand Hygiene Completed: Yes  Primary Wound Type: Pressure Injury  Location: Cheek  Laterality: Right  Wound Description (Comments): POA      Assessments 8/28/2023  4:00 PM   Wound Image     Site Assessment Black;Brown;Red   Periwound Assessment Clean;Dry;Intact   Margins Attached edges;Defined edges   Closure Open to air   Drainage Amount None   Treatments Cleansed;Site care   Wound Cleansing Foam Cleanser/Washcloth   Periwound Protectant Antibiotic Ointment   NEXT Weekly Photo (Inpatient Only) 09/04/23   Wound Team Following Weekly   Pressure Injury Stage Unstageable   Non-staged Wound Description Not applicable   Wound Length (cm) 3.5 cm   Wound Width (cm) 4.5 cm   Wound Surface Area (cm^2) 15.75 cm^2   Shape Round   Wound Odor None   WOUND NURSE ONLY - Time Spent with Patient (mins) 90       Wound 08/26/23 Pressure Injury Breast;Chest Inferior;Lower Right POA (Active)   Date First Assessed/Time First Assessed: 08/26/23 0200   Present on Original Admission: Yes  Hand Hygiene Completed: Yes  Primary Wound Type: Pressure Injury  Location: Breast;Chest  Wound Orientation: Inferior;Lower  Laterality: Right  Wound Descript...      Assessments 8/28/2023  4:00 PM   Wound Image      Site Assessment Yellow;Red;Black;Purple   Periwound Assessment Clean;Dry;Intact   Margins Attached edges;Defined edges   Closure Adhesive bandage   Drainage Amount Small   Drainage Description Serosanguineous   Treatments Cleansed;Site care;CSWD - Conservative Sharp Wound Debridement;Nonselective debridement;Offloading   Wound Cleansing Approved Wound Cleanser   Periwound Protectant Skin Protectant Wipes to Periwound   Dressing Status Clean;Dry;Intact   Dressing Changed New   Dressing Cleansing/Solutions Not Applicable   Dressing Options Hydrocolloid Thin;Offloading Dressing - Sacral   Dressing Change/Treatment Frequency Every 72 hrs, and As  Needed   NEXT Dressing Change/Treatment Date 08/31/23   NEXT Weekly Photo (Inpatient Only) 09/04/23   Wound Team Following Weekly   Pressure Injury Stage Unstageable   Non-staged Wound Description Not applicable   Wound Length (cm) 6.5 cm   Wound Width (cm) 14.5 cm   Wound Surface Area (cm^2) 94.25 cm^2   Shape Irregular   Wound Odor None       Wound 08/26/23 Pressure Injury Knee;Ankle Proximal Bilateral POA (Active)   Date First Assessed/Time First Assessed: 08/26/23 0342   Present on Original Admission: Yes  Primary Wound Type: Pressure Injury  Location: Knee;Ankle  Wound Orientation: Proximal  Laterality: Bilateral  Wound Description (Comments): POA      Assessments 8/28/2023  4:00 PM   Wound Image       Site Assessment Red;Drainage   Periwound Assessment Clean;Dry;Intact   Margins Attached edges;Defined edges   Closure Adhesive bandage   Drainage Amount Small   Drainage Description Serosanguineous   Treatments Cleansed;Nonselective debridement;Site care;Offloading;CSWD - Conservative Sharp Wound Debridement   Wound Cleansing Approved Wound Cleanser   Periwound Protectant Skin Protectant Wipes to Periwound   Dressing Status Clean;Dry;Intact   Dressing Changed New   Dressing Cleansing/Solutions Not Applicable   Dressing Options Hydrocolloid Thin;Offloading Dressing - Heel   Dressing Change/Treatment Frequency Every 72 hrs, and As Needed   NEXT Dressing Change/Treatment Date 08/31/23   NEXT Weekly Photo (Inpatient Only) 09/04/23   Wound Team Following Weekly   Pressure Injury Stage Stage 2   Shape Irregular x3   Wound Odor None       Wound 08/26/23 Pressure Injury Hand Right POA (Active)   Date First Assessed/Time First Assessed: 08/26/23 0343   Hand Hygiene Completed: Yes  Primary Wound Type: Pressure Injury  Location: Hand  Laterality: Right  Wound Description (Comments): POA      Assessments 8/28/2023  4:00 PM   Wound Image      Site Assessment Red;Purple   Periwound Assessment Clean;Dry;Intact   Margins  Attached edges;Defined edges   Closure Adhesive bandage   Drainage Amount Scant   Drainage Description Serosanguineous   Treatments Cleansed;Nonselective debridement;Site care;Offloading   Wound Cleansing Foam Cleanser/Washcloth   Periwound Protectant No-sting Skin Prep   Dressing Status Clean;Dry;Intact   Dressing Changed New   Dressing Cleansing/Solutions Not Applicable   Dressing Options Mepitel One   Dressing Change/Treatment Frequency Every 72 hrs, and As Needed   NEXT Dressing Change/Treatment Date 08/31/23   NEXT Weekly Photo (Inpatient Only) 09/04/23   Wound Team Following Weekly   Pressure Injury Stage Deep Tissue   Non-staged Wound Description Not applicable   Wound Length (cm) 9.5 cm   Wound Width (cm) 11 cm   Wound Surface Area (cm^2) 104.5 cm^2   Shape Irregular   Wound Odor None       Wound 08/27/23 Pressure Injury Hip Right POA (Active)   Date First Assessed/Time First Assessed: 08/27/23 1736   Primary Wound Type: Pressure Injury  Location: Hip  Laterality: Right  Wound Description (Comments): POA      Assessments 8/28/2023  4:00 PM   Wound Image        Site Assessment Black;Brown;Yellow;Slough;Painful;Eschar   Periwound Assessment Clean;Dry;Intact   Margins Attached edges;Defined edges   Closure Adhesive bandage   Drainage Amount Small   Drainage Description Serosanguineous   Treatments Cleansed;Nonselective debridement;Site care;Offloading;Chemical debridement;CSWD - Conservative Sharp Wound Debridement   Wound Cleansing Approved Wound Cleanser   Periwound Protectant Barrier Paste   Dressing Status Clean;Dry;Intact   Dressing Changed New   Dressing Cleansing/Solutions 1/4 Strength Dakin's Solution   Dressing Options Moist Roll Gauze;Offloading Dressing - Sacral   Dressing Change/Treatment Frequency Every 72 hrs, and As Needed   NEXT Dressing Change/Treatment Date 08/31/23   NEXT Weekly Photo (Inpatient Only) 09/04/23   Wound Team Following Weekly   Pressure Injury Stage Unstageable   Wound Length  (cm) 6.6 cm   Wound Width (cm) 8 cm   Wound Surface Area (cm^2) 52.8 cm^2   Shape Circular   Wound Odor None       Wound 08/25/23 Pressure Injury Coccyx;Sacrum POA (Active)   Date First Assessed: 08/25/23   Present on Original Admission: Yes  Primary Wound Type: Pressure Injury  Location: Coccyx;Sacrum  Wound Description (Comments): POA      Assessments 8/28/2023  4:00 PM   Wound Image     Site Assessment Purple;Red   Periwound Assessment Clean;Dry;Intact   Margins Attached edges;Defined edges   Closure Adhesive bandage   Drainage Amount None   Treatments Cleansed;Site care;Offloading   Wound Cleansing Foam Cleanser/Washcloth   Periwound Protectant Not Applicable   Dressing Status Clean;Dry;Intact   Dressing Changed New   Dressing Cleansing/Solutions Not Applicable   Dressing Options Offloading Dressing - Sacral   Dressing Change/Treatment Frequency Every 72 hrs, and As Needed   NEXT Dressing Change/Treatment Date 08/31/23   NEXT Weekly Photo (Inpatient Only) 09/04/23   Wound Team Following Weekly   Pressure Injury Stage Deep Tissue   Non-staged Wound Description Not applicable   Shape Irregular        Vascular:    MICHAEL:   No results found.    Lab Values:    Lab Results   Component Value Date/Time    WBC 4.6 (L) 08/28/2023 12:43 AM    RBC 4.84 08/28/2023 12:43 AM    HEMOGLOBIN 12.7 (L) 08/28/2023 12:43 AM    HEMATOCRIT 40.6 (L) 08/28/2023 12:43 AM         Culture Results show:  No results found for this or any previous visit (from the past 720 hour(s)).    Pain Level/Medicated:  None, Tolerated without pain medication       INTERVENTIONS BY WOUND TEAM:  Chart and images reviewed. Discussed with bedside RN. All areas of concern (based on picture review, LDA review and discussion with bedside RN) have been thoroughly assessed. Documentation of areas based on significant findings. This RN in to assess patient. Performed standard wound care which includes appropriate positioning, dressing removal and non-selective  debridement. Pictures and measurements obtained weekly if/when required.    Wound:  Right Cheek  Preparation for Dressing removal: Open to air  Cleansed/Non-selectively Debrided with:  No rinse foam soap and Moist warm washcloth  Robyn wound: Cleansed with N/A, Prepped with N/A  Primary Dressing:  Abx ointment ordered  Secondary (Outer) Dressing: NA MAGI     Wound:  Right Chest  Preparation for Dressing removal: Open to air  Cleansed/Non-selectively Debrided with:  Wound cleanser and Gauze  Non-Excisional Conservative Sharp debridement: Slough debrided away using curette, scissors, and forceps < 20cm2 debrided down to slough.  No Bleeding noted.  Robyn wound: Cleansed with Wound cleanser and Gauze, Prepped with No Sting  Primary Dressing:  Hydrocolloid thin  Secondary (Outer) Dressing: Sacral offloading dressing     Wound:  Right Hand  Preparation for Dressing removal: Open to air  Cleansed/Non-selectively Debrided with:  Wound cleanser and Gauze  Robyn wound: Cleansed with Wound cleanser and Gauze, Prepped with No Sting  Primary Dressing:  Mepitel One    Wound:  Right Hip  Preparation for Dressing removal: Open to air  Cleansed/Non-selectively Debrided with:  Wound cleanser and Gauze  Non-Excisional Conservative Sharp debridement: Slough and Eschar debrided away using scalpel, scissors, and forceps < 20cm2 debrided down to Adipose.  No Bleeding noted.  Robyn wound: Cleansed with Wound cleanser and Gauze, Prepped with Barrier paste  Primary Dressing:  NS moistened roll gauze (Dakins ordered)  Secondary (Outer) Dressing: Sacral offloading dressing     Wound:  Right Lateral Knee, Left Medial Knee and Right lateral ankle  Preparation for Dressing removal: Open to air  Cleansed/Non-selectively Debrided with:  Wound cleanser and Gauze  Non-Excisional Conservative Sharp debridement: of right lateral knee - eschar/dried hematoma debrided away using scissors and forceps < 20cm2 debrided down to viable tissue.  No Bleeding  noted.  Robyn wound: Cleansed with Wound cleanser and Gauze, Prepped with No Sting  Primary Dressing:  Hydrocolloid thin  Secondary (Outer) Dressing: Heel offloading dressings and moon boots     Wound:  Sacrum  Preparation for Dressing removal: Removed without difficulty  Cleansed/Non-selectively Debrided with:  Wound cleanser and Gauze  Robyn wound: Cleansed with Wound cleanser and Gauze, Prepped with N/A  Primary Dressing:  Sacral offloading dressing  Secondary (Outer) Dressing: AMALIA bed ordered       Advanced Wound Care Discharge Planning  Number of Clinicians necessary to complete wound care: 2  Is patient requiring IV pain medications for dressing changes:  No   Length of time for dressing change 45 min. (This does not include chart review, pre-medication time, set up, clean up or time spent charting.)    Interdisciplinary consultation: Patient, Bedside RN, Alina SIDHU (Wound RN).  Pressure injury and staging reviewed with Alina SIDHU (Wound RN).    EVALUATION / RATIONALE FOR TREATMENT:     Date:  08/28/23  Wound Status:  Initial evaluation    Pt with several POA pressure injuries related to being found down. ABX ointment to cheek to keep area moist. Pts chest, knees, and lateral ankle with hydrocolloid thin to autolytically debride. Right Hip with eschar which was debrided away and adipose beneath. Dakins applied to chemically debride. Right hand with sDTI and bullae. Mepitel one applied now pt will need to be continued to be followed as it evolves.          Goals: Steady decrease in wound area and depth weekly.    NURSING PLAN OF CARE ORDERS:  Dressing changes: See Dressing Care orders  Skin care: See Skin Care orders  RN Prevention Protocol    NUTRITION RECOMMENDATIONS   Wound Team Recommendations:  N/A    DIET ORDERS (From admission to next 24h)       Start     Ordered    08/28/23 1103  Diet Order Diet: Level 6 - Soft and Bite Sized (meds crushed); Liquid level: Level 0 - Thin; Tray Modifications (optional): SLP  - 1:1 Supervision by Nursing, SLP - Deliver to Nursing Station  ALL MEALS        Question Answer Comment   Diet: Level 6 - Soft and Bite Sized meds crushed   Liquid level Level 0 - Thin    Tray Modifications (optional) SLP - 1:1 Supervision by Nursing    Tray Modifications (optional) SLP - Deliver to Nursing Station        08/28/23 1102    08/27/23 1500  Supplements  BETWEEN MEALS        Question:  Which Supplement  Answer:  Per RD    08/27/23 1323                    PREVENTATIVE INTERVENTIONS:    Q shift Gonzalo - performed per nursing policy  Q shift pressure point assessments - performed per nursing policy    Surface/Positioning  Low Airloss - Ordered  Standard/trauma mattress - Currently in Place  Reposition q 2 hours - Currently in Place  TAPs Turning system - In room but not in use  Waffle overlay  - Currently in Place  Z Kady Pillow - Ordered    Offloading/Redistribution  Sacral offloading dressing (Silicone dressing) - Applied this Visit  Heel float boots (Prevalon boot) - Currently in Place      Respiratory  Silicone O2 tubing - Currently in Place  Gray Foam Ear protectors - Currently in Place    Containment/Moisture Prevention    Dri-kady pad - Currently in Place  Mejía Catheter - Currently in Place  Barrier paste - Currently in Place    Anticipated discharge plans:  TBD        Vac Discharge Needs:  Vac Discharge plan is purely a recommendation from wound team and not a requirement for discharge unless otherwise stated by physician.  Not Applicable Pt not on a wound vac

## 2023-08-28 NOTE — PROGRESS NOTES
Hospital Medicine Daily Progress Note    Date of Service  8/28/2023    Chief Complaint  Moshe Guerrero is a 87 y.o. male admitted 8/25/2023 with fall    Hospital Course  88 yo man with HLD who was found down by family, last seen well 8/20. Patient was unable to recall what happened. He was found with traumatic rhabdomyolysis. Mejía cath was placed for urinary retention. He had CTA chest positive for PE and started on heparin drip.    Interval Problem Update  8/27 - Hypoglycemic overnight to 65, corrected to 76  Hypernatremic 146    Renal function is good  HypoK 3.1, replete  LE doppler pending, TTE pending  NPO, speech pending  PTOT pending  Patient says his both his shoulders and knees hurt.  He does not remember much about the fall.  He says he has been lightheaded but does not know for how long.  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  Says his knees checks in on him.  His eyes have been burning, denies itching or changes in vision, no tearing or drainage.  I updated his niece Lina, cares for him.  She is his POA and has paperwork that indicates he is DO NOT RESUSCITATE.  She is agreeable to having him go to SNF, she plans to have him move in with her instead of continuing to live alone.    8/28 - No recurring hypoglycemia. Hypophos, replete. TTE and doppler pending.  He is slow to recall events.  He says his right side mainly aches.    I have discussed this patient's plan of care and discharge plan at IDT rounds today with Case Management, Nursing, Nursing leadership, and other members of the IDT team.    Consultants/Specialty  none    Code Status  DNAR/DNI    Disposition  The patient is not medically cleared for discharge to home or a post-acute facility.  Anticipate discharge to: skilled nursing facility    I have placed the appropriate orders for post-discharge needs.    Review of Systems  Review of Systems   Constitutional:  Positive for malaise/fatigue.   Eyes:         Legally blind per  patient   Respiratory:  Negative for shortness of breath.    Cardiovascular:  Negative for chest pain.   Gastrointestinal:  Negative for abdominal pain and nausea.   Musculoskeletal:  Positive for joint pain and myalgias.   Neurological:  Positive for weakness. Negative for dizziness and headaches.   Psychiatric/Behavioral:  Positive for memory loss.         Physical Exam  Temp:  [36.5 °C (97.7 °F)-37.2 °C (99 °F)] 36.5 °C (97.7 °F)  Pulse:  [66-76] 66  Resp:  [16-18] 18  BP: (141-154)/(65-73) 151/72  SpO2:  [94 %-97 %] 95 %    Physical Exam  Vitals and nursing note reviewed.   Constitutional:       Appearance: He is ill-appearing.   HENT:      Head:      Comments: Ecchymosis and dried blood to right cheek, lower legs     Mouth/Throat:      Mouth: Mucous membranes are moist.   Eyes:      General:         Right eye: No discharge.         Left eye: No discharge.   Cardiovascular:      Rate and Rhythm: Normal rate and regular rhythm.   Pulmonary:      Effort: Pulmonary effort is normal. No respiratory distress.      Breath sounds: No wheezing or rales.   Abdominal:      Palpations: Abdomen is soft.      Tenderness: There is no abdominal tenderness.   Musculoskeletal:      Cervical back: Neck supple.      Comments: Right hand is swollen with multiple blisters, right arm is edematous  Generalized right shoulder tenderness, pain with range of motion, no significant swelling palpated  Diffuse muscle atrophy   Skin:     General: Skin is warm and dry.   Neurological:      Mental Status: He is alert.      Comments: Oriented to self, and hospital.  Disoriented to date  Able to move all extremities         Fluids    Intake/Output Summary (Last 24 hours) at 8/28/2023 1308  Last data filed at 8/28/2023 0407  Gross per 24 hour   Intake --   Output 1475 ml   Net -1475 ml       Laboratory  Recent Labs     08/26/23  1732 08/27/23  0107 08/28/23  0043   WBC 4.6* 4.8 4.6*   RBC 5.00 4.94 4.84   HEMOGLOBIN 13.2* 13.0* 12.7*   HEMATOCRIT  42.2 41.7* 40.6*   MCV 84.4 84.4 83.9   MCH 26.4* 26.3* 26.2*   MCHC 31.3* 31.2* 31.3*   RDW 50.2* 49.8 49.6   PLATELETCT 141* 131* 140*   MPV 9.9 9.2 10.2     Recent Labs     08/27/23  0108 08/27/23  1345 08/28/23  0043   SODIUM 146* 143 138   POTASSIUM 3.1* 3.1* 3.7   CHLORIDE 114* 110 106   CO2 20 23 23   GLUCOSE 145* 129* 128*   BUN 25* 20 18   CREATININE 0.46* 0.44* 0.51   CALCIUM 8.2* 7.8* 7.6*     Recent Labs     08/25/23  1919 08/26/23  1732   APTT 25.3 28.0   INR 1.09 1.12               Imaging  DX-SHOULDER 2+ RIGHT   Final Result      1.  No acute fracture or dislocation of the right shoulder.   2.  There is moderate generative changes throughout the right shoulder.      CT-CTA CHEST PULMONARY ARTERY W/ RECONS   Final Result      1.  Exam is positive for isolated pulmonary embolism segmental and subsegmental segmental arteries right lower pulmonary lobe. Minimal subsegmental pulmonary embolism is noted left lower lobe.      2.  Minimal opacification of volume loss posterior lung bases could be due to atelectasis or fibrosis.      3.  Prominent size of pulmonary arteries could indicate pulmonary hypertension.      4.  Emphysema.      5.  Calcific atherosclerosis.      6.  These findings were communicated with SANDRITA OSBORNE on 08/26/2023.            CT-MAXILLOFACIAL W/O PLUS RECONS   Final Result         No acute maxillofacial fracture.      Mild mucosal thickening in bilateral maxillary sinuses, left more than right. Layering air-fluid levels in the right maxillary sinus.      Partial opacification of the right mastoid air cells.      DX-HAND 3+ RIGHT   Final Result      1.  No evidence of fracture or dislocation.      2.  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      3.  Joint surface irregularities and bone erosions are also present which could indicate erosive or inflammatory arthropathy.      CT-HEAD W/O   Final Result         1.  No acute intracranial abnormality is identified, there are  nonspecific white matter changes, commonly associated with small vessel ischemic disease.  Associated mild cerebral atrophy is noted.   2.  Bilateral maxillary, ethmoid, and frontal sinusitis changes   3.  Atherosclerosis.         DX-CHEST-PORTABLE (1 VIEW)   Final Result         1.  No acute cardiopulmonary disease.   2.  Atherosclerosis      DX-HUMERUS 2+ RIGHT   Final Result         1.  No acute traumatic bony injury.      DX-HIP-UNILATERAL-WITH PELVIS-1 VIEW RIGHT   Final Result         1.  No radiographic evidence of acute traumatic injury.   2.  Atherosclerosis      EC-ECHOCARDIOGRAM COMPLETE W/O CONT    (Results Pending)   US-EXTREMITY VENOUS LOWER BILAT    (Results Pending)   US-EXTREMITY VENOUS UPPER UNILAT RIGHT    (Results Pending)        Assessment/Plan  * Traumatic rhabdomyolysis, initial encounter (HCC)- (present on admission)  Assessment & Plan  CPK has decreased  PT OT, niece agreeable to SNF      Hypoglycemia  Assessment & Plan  Has not had recurring hypoglycemia.  Speech has started him on a modified diet.  Hold D5 infusion and trend glucose level    Pulmonary emboli (HCC)  Assessment & Plan  Pulmonary emboli on CT  Continue on heparin drip  Follow-up LE Dopplers and TTE, if normal can transition to oral anticoagulation    Urinary retention  Assessment & Plan  Chronically elevated PSA, assumed prostate cancer and followed by urologist  Keep Mejía catheter in place, will need to follow-up with urology    Facial trauma- (present on admission)  Assessment & Plan  CT maxillofacial is negative for fracture    Hypophosphatemia- (present on admission)  Assessment & Plan  Low, replete and recheck level    Hypokalemia- (present on admission)  Assessment & Plan  Improved monitor level    Hypernatremia- (present on admission)  Assessment & Plan  Improved improved, monitor level and oral intake    Swelling of right hand- (present on admission)  Assessment & Plan  X-ray was negative for fracture  Increased  swelling, Doppler ordered to assess for DVT  Supportive wound care    Elevated troponin- (present on admission)  Assessment & Plan  Troponin 42, 58, 29.,  Demand ischemia  EKG negative for STEMI.  He denies chest pain.  Does have PE  TTE pending      ACP (advance care planning)  Assessment & Plan  Full code    LBBB (left bundle branch block)  Assessment & Plan  EKG showing new onset left bundle branch block, not seen in this previous EKG in October 2022  TTE pending      Hyperlipidemia  Assessment & Plan  Holding statin due to rhabdomyolysis         VTE prophylaxis:    therapeutic anticoagulation with weight-based heparin gtt, pharmacy to adjust PRN      I have performed a physical exam and reviewed and updated ROS and Plan today (8/28/2023). In review of yesterday's note (8/27/2023), there are no changes except as documented above.

## 2023-08-28 NOTE — CARE PLAN
The patient is Watcher - Medium risk of patient condition declining or worsening    Shift Goals  Clinical Goals: comfort, rest. xray  Patient Goals: comfort, rest  Family Goals: BECKY    Progress made toward(s) clinical / shift goals:    Problem: Knowledge Deficit - Standard  Goal: Patient and family/care givers will demonstrate understanding of plan of care, disease process/condition, diagnostic tests and medications  Outcome: Progressing  Note: White board updated with POC and care team information during bedside report.      Problem: Skin Integrity  Goal: Skin integrity is maintained or improved  Outcome: Progressing  Note: Pt turned and positioned every two hours. Incontinence care provided and barrier paste applied as needed.       Problem: Fall Risk  Goal: Patient will remain free from falls  Outcome: Progressing  Note: Fall precautions in place. Bed in lowest position. Non-skid socks in place. Personal possessions within reach. Mobility sign on door. Bed-alarm on. Call light within reach. Pt educated regarding fall prevention and states understanding.       Problem: Pain - Standard  Goal: Alleviation of pain or a reduction in pain to the patient’s comfort goal  Outcome: Progressing  Note: Declines need for intervention     Problem: Urinary Elimination:  Goal: Ability to achieve a balanced intake and output will improve  Outcome: Progressing  Note: Mejía in place, accurate I&O

## 2023-08-28 NOTE — DISCHARGE PLANNING
Care Transition Team Assessment    LMSW spoke with pt at bedside for initial assessment, pt oriented but sherri. Pts DPOA is his niece, Deidre.     Pts current discharge plan is to go to SNF and pt will live with his Niece and her  Kalyan upon discharge from the SNF.    Pt uses a walker and currently lives alone.     LMSW sent out referrals for SNF, currently [facility], which family approves as they live in [place].     LMSW to follow up with pt and family.     Medical team pending testing prior to DC.    Information Source  Orientation Level: Oriented X4  Information Given By: Patient  Who is responsible for making decisions for patient? : Patient    Elopement Risk  Legal Hold: No  Ambulatory or Self Mobile in Wheelchair: No-Not an Elopement Risk  Elopement Risk: Not at Risk for Elopement    Interdisciplinary Discharge Planning  Primary Care Physician: Riley NARVAEZ  Lives with - Patient's Self Care Capacity: Alone and Unable to Care For Self  Support Systems: Family Member(s)  Housing / Facility: 1 Story House  Able to Return to Previous ADL's: Future Time w/Therapy  Mobility Issues: Yes  Prior Services: None  Assistance Needed: Yes  Durable Medical Equipment: Walker    Discharge Preparedness  What is your plan after discharge?: Skilled nursing facility  What are your discharge supports?: Other (comment) (Niece and Nieces )  Prior Functional Level: Uses Walker  Difficulity with ADLs: Walking  Difficulity with IADLs: Using the telephone or computer    Functional Assesment  Prior Functional Level: Uses Walker    Finances  Financial Barriers to Discharge: No  Prescription Coverage: Yes    Vision / Hearing Impairment  Right Eye Vision: Impaired  Left Eye Vision: Impaired    Domestic Abuse  Have you ever been the victim of abuse or violence?: No    Discharge Risks or Barriers  Discharge risks or barriers?: Lives alone, no community support, Complex medical needs  Patient risk  factors: Vulnerable adult, Lives alone and no community support    Anticipated Discharge Information  Discharge Disposition: D/T to SNF with Medicare cert in anticipation of skilled care (03)

## 2023-08-28 NOTE — DISCHARGE PLANNING
After Visit Summary   7/7/2017    Beverly Elena    MRN: [id number]           Patient Information     Date Of Birth          1975        Visit Information        Provider Department      7/7/2017 11:30 AM Lynn Tanner RN 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 Solid Organ Transplant ([facility])    [address] [place] [id number] [phone]              Who to contact     Please call your clinic at [phone] to:    Ask questions about your health    Make or cancel appointments    Discuss your medicines    Learn about your test results    Speak to your doctor   If you have compliments or concerns about an experience at your clinic, or if you wish to file a complaint, please contact [facility] Patient Relations at [phone] or email us at [email or web address]         Additional Information About Your Visit        MyChart Information     [facility] gives you secure access to your electronic health record. If you see a primary care provider, you can also send messages to your care team and make appointments. If you have questions, please call your primary care clinic.  If you do not have a primary care provider, please call [phone] and they will assist you.      [employer] is an electronic gateway that provides easy, online access to your medical records. With [employer], you can request a clinic appointment, read your test results, renew a prescription or communicate with your care team.     To access your existing account, please contact your [facility] or call [phone] for assistance.        Care  Agency/Facility Name: [facility]  Spoke To: Jennifer  Outcome: They will need to have PT/OT notes before they can accept.    1248  Spoke with Britney at [facility] they have accepted patient on service.     EveryWhere ID     This is your Care EveryWhere ID. This could be used by other organizations to access your [place] medical records  [id number]        Your Vitals Were     Last Period                   (LMP Unknown)            Blood Pressure from Last 3 Encounters:   07/06/17 113/74   06/18/17 93/52   02/19/16 116/69    Weight from Last 3 Encounters:   07/06/17 57 kg (125 lb 10.6 oz)   06/15/17 56.7 kg (125 lb)   06/15/17 54.6 kg (120 lb 6.4 oz)              Today, you had the following     No orders found for display         Today's Medication Changes          These changes are accurate as of: 7/7/17 11:59 PM.  If you have any questions, ask your nurse or doctor.               Start taking these medicines.        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        Dose/Directions    adeks chewable tablet   This may have changed:  how much to take   Used for:  Pancreatic insufficiency        Dose:  1 tablet   Take 1 tablet by mouth 2 times daily   Quantity:  60 tablet   Refills:  3       * amylase-lipase-protease 99481 UNITS Cpep per EC capsule   Commonly known as:  CREON   This may have changed:  additional instructions   Used for:  Pancreatic insufficiency   Changed by:  Caty Gerard RN        3 with meals & 2 with snacks   Quantity:  450 capsule   Refills:  0       * amylase-lipase-protease 46335 UNITS Tabs tablet   Commonly known as:  VIOKACE   This may have changed:  Another medication with the same name was changed. Make sure you understand how and when to take each.   Used for:  Exocrine pancreatic insufficiency   Changed by:  Trinity Power MD        Take 2 Viokace with 2 Creon for meals, 1 Viokace with 1  Creon for snacks.   Quantity:  240 tablet   Refills:  3       * Notice:  This list has 2 medication(s) that are the same as other medications prescribed for you. Read the directions carefully, and ask your doctor or other care provider to review them with you.         Where to get your medicines      These medications were sent to [facility] [place] - [address]  [address] [facility] [place] [address]     Phone:  [phone]     glucagon 1 MG kit                Primary Care Provider Office Phone # Fax #    Marleny Hathaway [phone]       [facility] [address] [facility] [place] [address]        Equal Access to Services     MAIDA BALDWIN : Hadii aad ku hadasho Soerasto, waaxda luqadaha, qaybta kaalmada adereidyada, brunilda gaspar . So [facility] [phone].    ATENCIÓN: Si habla español, tiene a liu disposición servicios gratuitos de asistencia lingüística. Llame al [phone].    We comply with applicable federal civil rights laws and [place] laws. We do not discriminate on the basis of race, color, national origin, age, disability sex, sexual orientation or gender identity.            Thank you!     Thank you for choosing [facility]  for your care. Our goal is always to provide you with excellent care. Hearing back from our patients is one way we can continue to improve our services. Please take a few minutes to complete the written survey that you may receive in the mail after your visit with us. Thank you!             Your Updated Medication List - Protect others around you: Learn how to safely use, store and throw away your medicines at www.disposemymeds.org.          This list is accurate as of: 7/7/17 11:59 PM.  Always use your most recent med list.                   Brand Name Dispense Instructions for use Diagnosis    adeks chewable tablet     60 tablet    Take 1 tablet by mouth 2 times daily    Pancreatic insufficiency        alcohol swab prep pads     1 each    Use to check blood glucose 6-8 times daily    Chronic pancreatitis (H)       amitriptyline 100 MG tablet    ELAVIL    30 tablet    Take 1 tablet (100 mg) by mouth At Bedtime    Chronic pancreatitis (H), Acute post-operative pain, Chronic abdominal pain, Encounter for long-term opiate analgesic use       * amylase-lipase-protease 59727 UNITS Cpep per EC capsule    CREON    450 capsule    3 with meals & 2 with snacks    Pancreatic insufficiency       * amylase-lipase-protease 11219 UNITS Tabs tablet    VIOKACE    240 tablet    Take 2 Viokace with 2 Creon for meals, 1 Viokace with 1 Creon for snacks.    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)       INSULIN PUMP - OUTPATIENT      1 unit/hour        MAXALT 10 MG tablet   Generic drug:  rizatriptan      Take 10 mg by mouth at onset of headache    Sphincter of Oddi dysfunction,  Pancreatic divisum, Recurrent acute pancreatitis       ondansetron 8 MG ODT tab    ZOFRAN-ODT     Every 4-6 hours as needed        PROBIOTIC DAILY PO      Take 2 tablets by mouth daily        REGLAN 10 MG tablet   Generic drug:  metoclopramide      Take 1 tablet by mouth every 6 hours as needed        VITAMIN D3 PO      Take 8,000 Units by mouth daily        * Notice:  This list has 2 medication(s) that are the same as other medications prescribed for you. Read the directions carefully, and ask your doctor or other care provider to review them with you.

## 2023-08-28 NOTE — THERAPY
Speech Language Pathology   Flexible Endoscopic Evaluation of Swallowing (FEES)        Patient Name: Moshe Guerrero  AGE:  87 y.o., SEX:  male  Medical Record #: [id number]  Date of Service: 8/28/2023      History of Present Illness  88 y/o male presented 8/25 with generalized weakness.  Found down by family with several areas of skin breakdown and skin tears on the right side of his body. Found to be hypertensive with abnormal labs.      Pertinent Information  Current Method of Nutrition: Oral diet (MM/TN 1:1)  Patient Behaviors: Confused   Dentition: Fair, Upper partial (Possible lower partial, missing L side of lower)   Feeding Tube: None   Tracheostomy: No   Factor(s) Affecting Performance: Impaired mental status     Discussed the risks, benefits, and alternatives of the FEES procedure. Patient/family acknowledged and agreed to proceed.      Assessment  Flexible Endoscopic Evaluation of Swallowing (FEES) completed at bedside today. The endoscope was passed transnasally via Right nare to evaluate the anatomy and physiology of swallowing. Pt tolerated the procedure with no apparent distress.    Anatomic Findings: Protrusion of the posterior pharyngeal wall, possibly consistent with osteophyte, present at the level of the epiglottis, which did appear to impact inversion. Also possible round, small but space-occupying mass in the vallecular space, which did not appear to impact swallowing. See picture below:      Vocal Fold Motion: Bilateral movement  Secretion Management:  Excess of thick, yellow secretions at onset of the study. Cleared with ongoing PO trials.   PO Trials: Ice Chips, Thin Liquid, Mildly Thick Liquid, Liquidised, Pudding, Soft & Bite Sized, Regular Solid, Mixed      Consistency PAS Score Timing Residue Comments   Thin Liquid 4 During swallow Vallecular Residue: Mild (5%-25%)  Pyriform Sinus Residue: Trace (1%-5%) Tsp - PAS 2  Cup - PAS 1 x3  Straw - PAS 1 x3  Sequential straw - PAS 4   Mildly Thick 1  N/A Vallecular Residue: Mild (5%-25%)  Pyriform Sinus Residue: Trace (1%-5%) Cup, straw   Liquidised 1 N/A Vallecular Residue: Moderate (25%-50%)  Pyriform Sinus Residue: Trace (1%-5%)    Pudding 1 N/A Vallecular Residue: Moderate (25%-50%)  Pyriform Sinus Residue: Trace (1%-5%)    Soft & Bite Sized 1 N/A Vallecular Residue: Moderate (25%-50%)  Pyriform Sinus Residue: Trace (1%-5%) Single peach   Regular Solid 1 N/A Vallecular Residue: Moderate (25%-50%)  Pyriform Sinus Residue: Trace (1%-5%)    Mixed 1 N/A Vallecular Residue: Mild (5%-25%)  Pyriform Sinus Residue: Trace (1%-5%) Large, heaping bite     Penetration-Aspiration Scale (PAS)  1     No contrast enters airway  2     Contrast enters the airway, remains above the vocal folds, and is ejected from the airway (not seen in the airway at the end of the swallow).  3     Contrast enters the airway, remains above the vocal folds, and is not ejected from the airway (is seen in the airway after the swallow).  4     Contrast enters the airway, contacts the vocal folds, and is ejected from the airway.  5     Contrast enters the airway, contacts the vocal folds, and is not ejected from the airway  6     Contrast enters the airway, crosses the plane of the vocal folds, and is ejected from the airway.  7     Contrast enters the airway, crosses the plane of the vocal folds, and is not ejected from the airway despite effort.  8     Contrast enters the airway, crosses the plane of the vocal folds, is not ejected from the airway and there is no response to aspiration.      Oral phase:  Disorganized and incomplete mastication of solid textures, with near full peaches moving into the pharynx from the oral cavity. Bolus stripping, bolus containment, and AP transit WFL, improved from time of CSE.      Pharyngeal phase:  Pharyngeal phase characterized by impairments in BOT retraction, pharyngeal constriction, and epiglottic inversion, with likely impact of anatomy as described  above, which resulted in the following:  - Single instance of deep penetration (to the vocal folds) with TN0 by sequential straw during the swallow (inferred). Penetrate did clear from the airway using spontaneous throat clear.  - Moderate vallecular residue with solid textures that did not reliably clear spontaneously  -  Mild PPW residue with solid textures that did not reliably clear spontaneously    Of note - periodic belch, concerning for esophageal component, with no visualized or suspected retrograde flow above the UES.     Compensatory Strategies:  Liquid wash - SOMEWHAT EFFECTIVE to reduce pharyngeal residue  Multiple swallows - SOMEWHAT EFFECTIVE to reduce pharyngeal residue  Throat clear - EFFECTIVE to clear single instance of deep penetration  Controlled rate of intake/bolus size - EFFECTIVE to prevent airway invasion of thins    Severity Rating:  NICOLE: Mild-Moderate        Clinical Impressions  The pt presents with a mild-moderate oropharyngeal dysphagia, likely acute on chronic related to acute AMS and rhabdomyolysis with increased debility in the setting of anatomic differences as described above. Swallow safety is generally preserved; pharyngeal efficiency is impaired. Would recommend SB/TN diet at this time with use of 1:1 related to AMS and to encourage use of strategies as outlined below and posted HOB. Pt appears to be a fair candidate for ongoing behavorial and exercise-based swallow rehabilitation. Dysphagia outcomes can be maximized with use of mobility as pt is able and frequent, thorough oral care.         Recommendations  Soft and bite-sized with thin liquids  Medication: Crush with applesauce, as appropriate, Crush with pudding/puree, as appropriate  Supervision: 1:1 feeding with constant supervision, Encourage self-feeding  Positioning: Fully upright and midline during oral intake  Strategies: Small bites/sips, Slow rate of intake, Alternate bites and sips, Multiple swallows (2-3) per  "bite/sips, Clear throat if voice sounds \"wet\"  Oral Care: Q6h  Additional Instrumentation: None         SLP Treatment Plan  Treatment Plan: Dysphagia Treatment, Patient/Family/Caregiver Training  SLP Frequency: 3x Per Week  Estimated Duration: Until Therapy Goals Met      Anticipated Discharge Needs  Discharge Recommendations: Anticipate that the patient will have no further speech therapy needs after discharge from the hospital   Therapy Recommendations Upon DC: Not Indicated       Patient / Family Goals  Patient / Family Goal #1: \"I just need water.\"  Goal #1 Outcome: Progressing as expected  Short Term Goal # 1: Pt will complete FEES w SLP to further evaluate swallow function and inform POC.  Goal Outcome # 1: Goal met, new goal added  Short Term Goal # 1 B : Pt will consume diet of SB/TN given strategy use with no worsening of lung status.  Short Term Goal # 2: Pt will complete exercises targeting BOT retraction and epiglottic inversion x20 in a sesison given min cues from SLP.      Aidee Newman, SLP  "

## 2023-08-29 NOTE — CARE PLAN
The patient is Watcher - Medium risk of patient condition declining or worsening    Shift Goals  Clinical Goals: therapeutic xA  Patient Goals: comfort, rest  Family Goals: bryant    Progress made toward(s) clinical / shift goals:    Problem: Knowledge Deficit - Standard  Goal: Patient and family/care givers will demonstrate understanding of plan of care, disease process/condition, diagnostic tests and medications  Outcome: Progressing  Note: White board updated with POC and care team information during bedside report.      Problem: Skin Integrity  Goal: Skin integrity is maintained or improved  Outcome: Progressing  Note: Pt turned and positioned every two hours. Incontinence care provided and barrier paste applied as needed.       Problem: Fall Risk  Goal: Patient will remain free from falls  Outcome: Progressing  Note: Fall precautions in place. Bed in lowest position. Non-skid socks in place. Personal possessions within reach. Mobility sign on door. Bed-alarm on. Call light within reach. Pt educated regarding fall prevention and states understanding.       Problem: Pain - Standard  Goal: Alleviation of pain or a reduction in pain to the patient’s comfort goal  Outcome: Progressing  Note: Declines need for pharmacologic intervention

## 2023-08-29 NOTE — THERAPY
"Occupational Therapy   Initial Evaluation     Patient Name: Moshe Guerrero  Age:  87 y.o., Sex:  male  Medical Record #: [id number]  Today's Date: 8/29/2023     Precautions: Fall Risk, Swallow Precautions    Assessment    Patient is 87 y.o. male with h/o HLD, high cholesterol, macular degeneration, found down at home. Last known well 5 days prior. Pt dx with traumatic rhabdomyolysis, PE (tx with Heparin), hypoglycemia, multiple lab abnormalities, urinary retention. Pt seen for OT eval. See grid below for details. Pt somewhat confused, lethargic with limited initiation following instruction. He required total A to mobilize to EOB, then was able to maintain sitting balance without support. Total A to don socks. Unable to achieve full stand despite 2-person assist and multiple attempts (transfer deferred). BUE limited by contusions, pain, R worse than L (RUE also limited by edema). Pt is below functional baseline from OT standpoint in this setting. Per notes the plan is for him to move in with niece following SNF. Will benefit from ongoing acute OT.     Plan    Occupational Therapy Initial Treatment Plan   Treatment Interventions: Self Care / Activities of Daily Living, Adaptive Equipment, Neuro Re-Education / Balance, Therapeutic Exercises, Therapeutic Activity, Cognitive Skill Development  Treatment Frequency: 3 Times per Week  Duration: Until Therapy Goals Met    DC Equipment Recommendations: Unable to determine at this time  Discharge Recommendations: Recommend post-acute placement for additional occupational therapy services prior to discharge home     Subjective    \"Don't rush me.\"      Objective       08/29/23 0908   Prior Living Situation   Prior Services Other (Comments)  (Assist with I-ADL)   Lives with - Patient's Self Care Capacity Alone and Unable to Care For Self  (with support for I-ADL)   Comments Pt is limited historian; per SW notes he uses a walker (unknown what type) PTA   Prior Level of ADL Function "   Self Feeding Independent   Grooming / Hygiene Independent   Bathing Independent   Dressing Independent   Toileting Independent   Comments Per pt he was independent with BADL PTA   Prior Level of IADL Function   Medication Management Independent   Finances   (all expenses set for auto pay)   Home Management Requires Assist   Shopping Dependent   Prior Level Of Mobility Independent With Device in Home  (walker but unclear whether FWW or 4WW)   Driving / Transportation Relatives / Others Provide Transportation   Cognition    Speech/ Communication Hard of Hearing;Delayed Responses   Level of Consciousness Responds to voice  (lethargic, cues to keep eyes open; improved as session progressed)   New Learning Impaired   Attention Impaired   Sequencing Impaired   Initiation Impaired   Passive ROM Upper Body   Comments Limited BUE by pain   Active ROM Upper Body   Dominant Hand Right   Comments Limited by pain BUE; R much worse than L   Strength Upper Body   Comments Limited BUE by pain & ROM impairments   Coordination Upper Body   Comments RUE minimally functional, limited by pain, swelling; LUE able to grasp cup and bring to mouth with close guarding   Balance Assessment   Sitting Balance (Static) Fair -   Sitting Balance (Dynamic) Fair -   Standing Balance (Static) Trace +  (partial stand only)   Weight Shift Sitting Poor   Weight Shift Standing Absent   Bed Mobility    Supine to Sit Total Assist   Sit to Supine Maximal Assist   Scooting Maximal Assist  (seated)   ADL Assessment   Eating Minimal Assist  (drinking from lidded cup with L hand; anticipate max A to use utensil)   Grooming Minimal Assist  (wipe eyes using L hand)   Lower Body Dressing Total Assist  (B socks)   Toileting   (NT; Mejía, no BM)   Functional Mobility   Sit to Stand Maximal Assist  (2-person assist for partial stand; unable to achieve upright posture)   Bed, Chair, Wheelchair Transfer Unable to Participate   Mobility Bed mobility, partial STSs    Visual Perception   Visual Acuity Impaired Bilaterally   Comments advanced macular degeneration   Edema / Skin Assessment   Comments large round abrasion/laceration to R cheek; diffuse bruising/skin tears to all extremities; mild edema to entire RUE   Activity Tolerance   Sitting in Chair unable   Sitting Edge of Bed 12 min   Standing brief partial stands only   Short Term Goals   Short Term Goal # 1 Pt will complete ADL transfers with min A   Short Term Goal # 2 Pt will complete gown change with min A   Short Term Goal # 3 Pt will complete seated oral care with min A

## 2023-08-29 NOTE — DISCHARGE PLANNING
DC Transport Scheduled    Received request at: 8/29/2023 at 1251    Transport Company Scheduled:  [employer]  Spoke with Gloria at [facility] to schedule transport.    Scheduled Date: 8/30/2023  Scheduled Time: 1200    Destination: [facility] at [address] [facility] [address]     Notified care team of scheduled transport via [employer].     If there are any changes needed to the DC transportation scheduled, please contact [facility] at ext. [phone] between the hours of 4826-6952 Mon-Fri. If outside those hours, contact the ED Case Manager at ext. [phone].

## 2023-08-29 NOTE — PROGRESS NOTES
Assumed care of patient. A&Ox1, denies pain. Updated on plan of care. Pt comfortable in bed, will continue to monitor.

## 2023-08-29 NOTE — THERAPY
Physical Therapy   Initial Evaluation     Patient Name: Moshe Guerrero  Age:  87 y.o., Sex:  male  Medical Record #: [id number]  Today's Date: 8/29/2023     Precautions: Fall Risk;Swallow Precautions  Comments: multiple skin abrasions    Assessment  Patient is 87 y.o. male found down at home for unknown length of time, found to have traumatic rhabdomyolysis. PMH includes HLD and macular degeneration. PTA reported pt was living alone and was ind with mob. Today he demonstrated BLE rigidity with limited volition of LE mvt, unable to flex R knee to 90 degrees while EOB. He required total A for bed mob. Initially he demonstrated posterior lean however improved with midline control with duration of sitting. Attempted STS with max A of 2 and only able to achieve 50-75% of transition therefore transfer or amb attempt deferred. Pt A&O x2, poor command following requiring repetitive cues to keep eyes open or attempt task with poor follow-through. Currently recommend placement prior to DC home.    Plan    Physical Therapy Initial Treatment Plan   Treatment Plan : Bed Mobility, Equipment, Gait Training, Neuro Re-Education / Balance, Self Care / Home Evaluation, Therapeutic Activities, Therapeutic Exercise  Treatment Frequency: 3 Times per Week  Duration: Until Therapy Goals Met    DC Equipment Recommendations: Unable to determine at this time  Discharge Recommendations: Recommend post-acute placement for additional physical therapy services prior to discharge home     Objective    Prior Living Situation   Prior Services   (Family assists with transportation, laundry, cleaning)   Housing / Facility [address]   Lives with - Patient's Self Care Capacity Alone and Unable to Care For Self   Comments Pt is poor historian, per notes plan is for DC to SNF and then to live with niece/nephew. Pt reports no AD at baseline   Prior Level of Functional Mobility   Comments Per reports, pt was ind with mob without use of AD. Will need to  confirm with family.   History of Falls   History of Falls Yes   Date of Last Fall   (reason for admit - found down for unknown length of time)   Cognition    Cognition / Consciousness X   Speech/ Communication Hard of Hearing   Orientation Level Not Oriented to Reason;Not Oriented to Month   Level of Consciousness Responds to voice   Ability To Follow Commands Unable to Follow 1 Step Commands   New Learning Impaired   Attention Impaired   Sequencing Impaired   Initiation Impaired   Comments required repetition instruction with minimal - no command following, frequent cues to keep eyes open to attend to task, pt requests increased time however no initiation of tasks   Active ROM Upper Body   Comments BUE limited by weakness and pain   Passive ROM Lower Body   Passive ROM Lower Body X   Comments BLE limited by rigidity, however LLE > RLE,  unable to flex R knee past 90 degrees for EOB positioning   Active ROM Lower Body    Active ROM Lower Body  X   Comments BLE grossly limited by weakness, minimal volitional effort   Strength Lower Body   Lower Body Strength  X   Comments unable to formally assess BLE strength 2/2 poor command following, poor initiation of hip and knee extension to participate in STS performing 50-75% of transition with max A of 2   Neurological Concerns   Neurological Concerns Yes   Comments given cognition and rigidity of extremities   Coordination Lower Body    Coordination Lower Body  X   Comments BLE grossly impaired by weakness   Vision   Vision Comments reports legally blind at baseline, pt requiring frequent cues to keep eyes open to attend to tasks   Balance Assessment   Sitting Balance (Static) Fair -   Sitting Balance (Dynamic) Poor +   Standing Balance (Static) Dependent   Weight Shift Sitting Absent   Weight Shift Standing Absent   Comments unable to achieve full STS with max A of 2   Bed Mobility    Supine to Sit Total Assist   Sit to Supine Maximal Assist   Scooting Maximal Assist    Rolling Total Assist to Rt.;Total Assist to Lt.   Gait Analysis   Gait Level Of Assist Unable to Participate   Functional Mobility   Sit to Stand Maximal Assist  (of 2, able to achieve 50-75% of transition)   How much difficulty does the patient currently have...   Turning over in bed (including adjusting bedclothes, sheets and blankets)? 1   Sitting down on and standing up from a chair with arms (e.g., wheelchair, bedside commode, etc.) 1   Moving from lying on back to sitting on the side of the bed? 1   How much help from another person does the patient currently need...   Moving to and from a bed to a chair (including a wheelchair)? 1   Need to walk in a hospital room? 1   Climbing 3-5 steps with a railing? 1   6 clicks Mobility Score 6   Activity Tolerance   Sitting in Chair Unable   Sitting Edge of Bed 15 mins   Standing Attempted   Edema / Skin Assessment   Comments multiple skin abrasions throughout extremities   Short Term Goals    Short Term Goal # 1 Pt will perform supine<>sit with power features of bed and min A within 6 visits.   Short Term Goal # 2 Pt will perform STS with mod A within 6 visits to participate in transfers.   Short Term Goal # 3 Pt will amb >10ft with FWW and mod A within 6 visits.

## 2023-08-29 NOTE — DISCHARGE PLANNING
Case Management Discharge Planning    Admission Date: 8/25/2023  GMLOS: 4.7  ALOS: 4    6-Clicks ADL Score: 9  6-Clicks Mobility Score: 6    Anticipated Discharge Dispo: Discharge Disposition: D/T to SNF with Medicare cert in anticipation of skilled care (03)    LMSW spoke with pts Deidre bolden DPOA regarding discharge planning.  LMSW placed telephone call to [facility] to see about bed availability for tomorrow, 8/30.  LMSW completed transport paperwork, faxed to [facility] for tomorrow. Requested 1200.    LMSW to follow up tomorrow and ensure no change in DC Plan.     1620 Update  LMSW began working on COBRA Packet for tomorrows discharge at 1200 via [facility] to [facility].

## 2023-08-29 NOTE — PROGRESS NOTES
Hospital Medicine Daily Progress Note    Date of Service  8/29/2023    Chief Complaint  Moshe Guerrero is a 87 y.o. male admitted 8/25/2023 with fall    Hospital Course  88 yo man with HLD who was found down by family, last seen well 8/20. Patient was unable to recall what happened. He was found with traumatic rhabdomyolysis. Mejía cath was placed for urinary retention. He had CTA chest positive for PE and started on heparin drip.    Interval Problem Update  8/27 - Hypoglycemic overnight to 65, corrected to 76  Hypernatremic 146    Renal function is good  HypoK 3.1, replete  LE doppler pending, TTE pending  NPO, speech pending  PTOT pending  Patient says his both his shoulders and knees hurt.  He does not remember much about the fall.  He says he has been lightheaded but does not know for how long.  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  Says his knees checks in on him.  His eyes have been burning, denies itching or changes in vision, no tearing or drainage.  I updated his niece Lina, cares for him.  She is his POA and has paperwork that indicates he is DO NOT RESUSCITATE.  She is agreeable to having him go to SNF, she plans to have him move in with her instead of continuing to live alone.    8/28 - No recurring hypoglycemia. Hypophos, replete. TTE and doppler pending.  He is slow to recall events.  He says his right side mainly aches.    8/29/2023:  Patient seen and evaluated patient continues on heparin infusion.  I have discontinued heparin infusion initiated patient on Eliquis 10 mg twice daily for 7 days then 5 mg twice daily thereafter.  Anticipate transition to [facility] skilled nursing facility on 8/30/2023 otherwise no acute events overnight.  Laboratory values within acceptable limits.  I have discussed this patient's plan of care and discharge plan at IDT rounds today with Case Management, Nursing, Nursing leadership, and other members of the IDT  team.    Consultants/Specialty  none    Code Status  DNAR/DNI    Disposition  The patient is medically cleared for discharge to home or a post-acute facility.  Anticipate discharge to: skilled nursing facility    I have placed the appropriate orders for post-discharge needs.    Review of Systems  Review of Systems   Constitutional:  Positive for malaise/fatigue.   Eyes:         Legally blind per patient   Respiratory:  Negative for shortness of breath.    Cardiovascular:  Negative for chest pain.   Gastrointestinal:  Negative for abdominal pain and nausea.   Musculoskeletal:  Positive for joint pain and myalgias.   Neurological:  Positive for weakness. Negative for dizziness and headaches.   Psychiatric/Behavioral:  Positive for memory loss.         Physical Exam  Temp:  [36.2 °C (97.2 °F)-37 °C (98.6 °F)] 36.4 °C (97.5 °F)  Pulse:  [71-87] 71  Resp:  [16-18] 16  BP: (116-152)/(57-84) 132/64  SpO2:  [94 %-97 %] 97 %    Physical Exam  Vitals and nursing note reviewed.   Constitutional:       Appearance: He is ill-appearing.   HENT:      Head:      Comments: Ecchymosis and dried blood to right cheek, lower legs     Mouth/Throat:      Mouth: Mucous membranes are moist.   Eyes:      General:         Right eye: No discharge.         Left eye: No discharge.   Cardiovascular:      Rate and Rhythm: Normal rate and regular rhythm.   Pulmonary:      Effort: Pulmonary effort is normal. No respiratory distress.      Breath sounds: No wheezing or rales.   Abdominal:      Palpations: Abdomen is soft.      Tenderness: There is no abdominal tenderness.   Musculoskeletal:      Cervical back: Neck supple.      Comments: Right hand is swollen with multiple blisters, right arm is edematous  Generalized right shoulder tenderness, pain with range of motion, no significant swelling palpated  Diffuse muscle atrophy   Skin:     General: Skin is warm and dry.   Neurological:      Mental Status: He is alert.      Comments: Oriented to self,  and hospital.  Disoriented to date  Able to move all extremities         Fluids    Intake/Output Summary (Last 24 hours) at 8/29/2023 1705  Last data filed at 8/29/2023 1434  Gross per 24 hour   Intake 334.17 ml   Output 2400 ml   Net -2065.83 ml       Laboratory  Recent Labs     08/27/23  0107 08/28/23  0043 08/29/23  0006   WBC 4.8 4.6* 5.5   RBC 4.94 4.84 4.77   HEMOGLOBIN 13.0* 12.7* 12.4*   HEMATOCRIT 41.7* 40.6* 38.2*   MCV 84.4 83.9 80.1*   MCH 26.3* 26.2* 26.0*   MCHC 31.2* 31.3* 32.5   RDW 49.8 49.6 45.0   PLATELETCT 131* 140* 150*   MPV 9.2 10.2 10.1     Recent Labs     08/27/23  1345 08/28/23  0043 08/29/23  0006   SODIUM 143 138 133*   POTASSIUM 3.1* 3.7 3.8   CHLORIDE 110 106 100   CO2 23 23 24   GLUCOSE 129* 128* 113*   BUN 20 18 9   CREATININE 0.44* 0.51 0.47*   CALCIUM 7.8* 7.6* 7.1*     Recent Labs     08/26/23  1732   APTT 28.0   INR 1.12               Imaging  US-EXTREMITY VENOUS LOWER BILAT   Final Result      US-EXTREMITY VENOUS UPPER UNILAT RIGHT   Final Result      DX-SHOULDER 2+ RIGHT   Final Result      1.  No acute fracture or dislocation of the right shoulder.   2.  There is moderate generative changes throughout the right shoulder.      CT-CTA CHEST PULMONARY ARTERY W/ RECONS   Final Result      1.  Exam is positive for isolated pulmonary embolism segmental and subsegmental segmental arteries right lower pulmonary lobe. Minimal subsegmental pulmonary embolism is noted left lower lobe.      2.  Minimal opacification of volume loss posterior lung bases could be due to atelectasis or fibrosis.      3.  Prominent size of pulmonary arteries could indicate pulmonary hypertension.      4.  Emphysema.      5.  Calcific atherosclerosis.      6.  These findings were communicated with SANDRITA OSBORNE on 08/26/2023.            CT-MAXILLOFACIAL W/O PLUS RECONS   Final Result         No acute maxillofacial fracture.      Mild mucosal thickening in bilateral maxillary sinuses, left more than right.  Layering air-fluid levels in the right maxillary sinus.      Partial opacification of the right mastoid air cells.      DX-HAND 3+ RIGHT   Final Result      1.  No evidence of fracture or dislocation.      2.  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      3.  Joint surface irregularities and bone erosions are also present which could indicate erosive or inflammatory arthropathy.      CT-HEAD W/O   Final Result         1.  No acute intracranial abnormality is identified, there are nonspecific white matter changes, commonly associated with small vessel ischemic disease.  Associated mild cerebral atrophy is noted.   2.  Bilateral maxillary, ethmoid, and frontal sinusitis changes   3.  Atherosclerosis.         DX-CHEST-PORTABLE (1 VIEW)   Final Result         1.  No acute cardiopulmonary disease.   2.  Atherosclerosis      DX-HUMERUS 2+ RIGHT   Final Result         1.  No acute traumatic bony injury.      DX-HIP-UNILATERAL-WITH PELVIS-1 VIEW RIGHT   Final Result         1.  No radiographic evidence of acute traumatic injury.   2.  Atherosclerosis      EC-ECHOCARDIOGRAM COMPLETE W/O CONT    (Results Pending)        Assessment/Plan  * Traumatic rhabdomyolysis, initial encounter (HCC)- (present on admission)  Assessment & Plan  CPK has decreased  PT OT, niece agreeable to SNF  8/29/2023:  -Resolved    Hypoglycemia  Assessment & Plan  Has not had recurring hypoglycemia.  Speech has started him on a modified diet.  Hold D5 infusion and trend glucose level    Pulmonary emboli ([facility])  Assessment & Plan  Pulmonary emboli on CT  Continue on heparin drip  Follow-up LE Dopplers and TTE, if normal can transition to oral anticoagulation  8/29/2023:  Lower extremity Dopplers upper extremity Dopplers negative have discontinued heparin infusion transition patient to Eliquis 10 mg twice daily for 7 days then 5 mg twice daily thereafter.    Urinary retention  Assessment & Plan  Chronically elevated PSA, assumed prostate cancer and followed  by urologist  Keep Mejía catheter in place, will need to follow-up with urology    Facial trauma- (present on admission)  Assessment & Plan  CT maxillofacial is negative for fracture    Hypophosphatemia- (present on admission)  Assessment & Plan  Low, replete and recheck level    Hypokalemia- (present on admission)  Assessment & Plan  Improved monitor level    Hypernatremia- (present on admission)  Assessment & Plan  Improved improved, monitor level and oral intake    Swelling of right hand- (present on admission)  Assessment & Plan  X-ray was negative for fracture  Increased swelling, Doppler ordered to assess for DVT  Supportive wound care    Elevated troponin- (present on admission)  Assessment & Plan  Troponin 42, 58, 29.,  Demand ischemia  EKG negative for STEMI.  He denies chest pain.  Does have PE  TTE pending      ACP (advance care planning)  Assessment & Plan  Full code    LBBB (left bundle branch block)  Assessment & Plan  EKG showing new onset left bundle branch block, not seen in this previous EKG in October 2022  TTE pending      Hyperlipidemia  Assessment & Plan  Holding statin due to rhabdomyolysis       Greater than 51 minutes spent prepping to see patient (e.g. review of tests) obtaining and/or reviewing separately obtained history. Performing a medically appropriate examination and/ evaluation.  Counseling and educating the patient/family/caregiver.  Ordering medications, tests, or procedures.  Referring and communicating with other health care professionals.  Documenting clinical information in EPIC.  Independently interpreting results and communicating results to patient/family/caregiver.  Care coordination.    Please note that this dictation was created using voice recognition software. I have made every reasonable attempt to correct obvious errors, but I expect that there are errors of grammar and possibly context that I did not discover before finalizing the note.      VTE prophylaxis:    SCDs/TEDs   therapeutic anticoagulation with eliquis 5 mg BID      I have performed a physical exam and reviewed and updated ROS and Plan today (8/29/2023). In review of yesterday's note (8/28/2023), there are no changes except as documented above.

## 2023-08-29 NOTE — CARE PLAN
The patient is Stable - Low risk of patient condition declining or worsening    Shift Goals  Clinical Goals: hemodynamically stable  Patient Goals: rest, comfort  Family Goals: BECKY    Progress made toward(s) clinical / shift goals:    Problem: Knowledge Deficit - Standard  Goal: Patient and family/care givers will demonstrate understanding of plan of care, disease process/condition, diagnostic tests and medications  Outcome: Progressing     Problem: Skin Integrity  Goal: Skin integrity is maintained or improved  Outcome: Progressing     Problem: Fall Risk  Goal: Patient will remain free from falls  Outcome: Progressing     Problem: Pain - Standard  Goal: Alleviation of pain or a reduction in pain to the patient’s comfort goal  Outcome: Progressing     Problem: Urinary Elimination:  Goal: Ability to achieve and maintain adequate renal perfusion and functioning will improve  Outcome: Progressing       Patient is not progressing towards the following goals:

## 2023-08-29 NOTE — PROGRESS NOTES
Monitor Summary:    SR 73-78    Unsustained down to 45 bpm    Frequent pvc; frequent couplet    .22/.09/.36

## 2023-08-30 NOTE — PROGRESS NOTES
Assumed care of patient. A&OX1, denies pain and shortness of breath. Repositioned and reoriented to plan of care. Resting comfortably and will continue to monitor.

## 2023-08-30 NOTE — DISCHARGE PLANNING
Case Management Discharge Planning    Admission Date: 8/25/2023  GMLOS: 4.7  ALOS: 5    6-Clicks ADL Score: 9  6-Clicks Mobility Score: 6    Anticipated Discharge Dispo: Discharge Disposition: D/T to SNF with Medicare cert in anticipation of skilled care (03)    DME Needed: No    Action(s) Taken: LMSW followed up regarding medical clearance.   Pt is cleared to go today, 8/30 - Transport via [facility] at 1200  COBRA packet in process for RN.    Escalations Completed: None    Medically Clear: No    [facility]     Barriers to Discharge: None    Is the patient up for discharge tomorrow: No, today.

## 2023-08-30 NOTE — DIETARY
Nutrition Update:    Day 5 of admit.  Moshe Guerrero is a 87 y.o. male with admitting DX of Traumatic rhabdomyolysis, initial encounter ([facility]) [T79.6XXA].  Patient being followed to optimize nutrition.    Current Diet: L6 soft and bite sized with thin liquids and supplements; PO intake % of all meals and % of some supplements.     Problem: Nutritional:  Goal: Achieve adequate nutritional intake  Description: Patient will consume >50% of meals  Outcome: Met    RD monitoring per dept policy

## 2023-08-30 NOTE — PROGRESS NOTES
Report given to Sushant BARAHONA, at 1130, at [facility]. All questions answered and RN given unit phone number for call back with any questions.

## 2023-08-30 NOTE — DISCHARGE SUMMARY
Discharge Summary    CHIEF COMPLAINT ON ADMISSION  Chief Complaint   Patient presents with    GLF       Reason for Admission  EMS     Admission Date  8/25/2023    CODE STATUS  DNAR/DNI    HPI & HOSPITAL COURSE  This is a 87 y.o. male here with ground-level fall  Moshe Guerrero is a 87 y.o. male who presented 8/25/2023 with generalized weakness.  History taken by chart review as patient unable to provide reliable history at this time.  Patient last known to be well on August 28.  Patient was visited by family today and was found down on the ground and confused.  He was found to have several areas of skin breakdown and skin tears on the right side of his body.  He was then taken to ER for further evaluation.     At ER, patient found to be hypertensive.  Pertinent labs include sodium 148, potassium 3.5, prerenal azotemia, CPK over 2000.  CT head showed no acute intracranial abnormality.  Patient has CT scan of the chest with contrast which was significant for isolated pulmonary embolism in segmental and subsegmental arteries right lower pulmonary lobe.  There was minimal evidence of pulmonary embolism and left lower lobe.  Lower and upper extremity Doppler with ultrasound was negative for evidence of DVT.  Patient was subsequent taken off heparin infusion and placed on Eliquis 10 mg twice daily for total of 7 days to be followed by 5 mg twice daily thereafter.  All necessary medications are reflected below.  Patient would benefit from rehabilitation at this point time.  Patient will follow-up with his primary care provider.  Therefore, he is discharged in good and stable condition to skilled nursing facility.    The patient met 2-midnight criteria for an inpatient stay at the time of discharge.    Discharge Date  8/30/2023    FOLLOW UP ITEMS POST DISCHARGE  Take all medication as prescribed  Go to all follow-up appointments as indicated      DISCHARGE DIAGNOSES  Principal Problem:    Traumatic rhabdomyolysis, initial  encounter (HCC) (POA: Yes)  Active Problems:    Hyperlipidemia (POA: Unknown)      Overview: Chronic in nature. Stable. Currently taking simvastatin 80 mg nightly       without side effects. Last labs indicate excellent numbers.     LBBB (left bundle branch block) (POA: Unknown)    ACP (advance care planning) (POA: Unknown)    Elevated troponin (POA: Yes)    Swelling of right hand (POA: Yes)    Hypernatremia (POA: Yes)    Hypokalemia (POA: Yes)    Hypophosphatemia (POA: Yes)    Facial trauma (POA: Yes)    Urinary retention (POA: Unknown)    Pulmonary emboli (HCC) (POA: Unknown)    Hypoglycemia (POA: Unknown)  Resolved Problems:    * No resolved hospital problems. *      FOLLOW UP  Future Appointments   Date Time Provider Department Center   10/17/2023  2:00 PM Riley Conway [facility] Darek Conway [facility]  [address]  [facility] [id number] [phone]    Follow up  Please call your primary care provider to schedule a hospital follow up. Thank you.      MEDICATIONS ON DISCHARGE     Medication List        START taking these medications        Instructions   * apixaban 5mg Tabs  Commonly known as: Eliquis   Take 2 Tablets by mouth 2 times a day for 6 days. Indications: DVT/PE  Dose: 10 mg     * apixaban 5mg Tabs  Start taking on: September 5, 2023  Commonly known as: Eliquis   Take 1 Tablet by mouth 2 times a day. Indications: DVT/PE  Dose: 5 mg     dakins 0.125% (1/4 strength) 0.125 % Soln   Apply 10 mL topically 2 times a day.  Dose: 10 mL     oxyCODONE immediate-release 5 MG Tabs  Commonly known as: Roxicodone   Take 1 Tablet by mouth every 8 hours as needed for Severe Pain for up to 3 days.  Dose: 5 mg           * This list has 2 medication(s) that are the same as other medications prescribed for you. Read the directions carefully, and ask your doctor or other care provider to review them with you.                CONTINUE taking these medications         Instructions   aspirin 81 MG EC tablet   Take 81 mg by mouth every day.  Dose: 81 mg     CENTRUM ADULTS PO   Take 1 Tablet by mouth every day.  Dose: 1 Tablet     D3-50 PO   Take 1 Tablet by mouth every day.  Dose: 1 Tablet     omeprazole 20 MG delayed-release capsule  Commonly known as: PriLOSEC   Take 20 mg by mouth every day.  Dose: 20 mg     simvastatin 80 MG tablet  Commonly known as: Zocor   TAKE ONE TABLET BY MOUTH AT BEDTIME              Allergies  No Known Allergies    DIET  Orders Placed This Encounter   Procedures    Diet Order Diet: Level 6 - Soft and Bite Sized (meds crushed); Liquid level: Level 0 - Thin; Tray Modifications (optional): SLP - 1:1 Supervision by Nursing, SLP - Deliver to Nursing Station     Standing Status:   Standing     Number of Occurrences:   1     Order Specific Question:   Diet:     Answer:   Level 6 - Soft and Bite Sized [23]     Comments:   meds crushed     Order Specific Question:   Liquid level     Answer:   Level 0 - Thin     Order Specific Question:   Tray Modifications (optional)     Answer:   SLP - 1:1 Supervision by Nursing     Order Specific Question:   Tray Modifications (optional)     Answer:   SLP - Deliver to Nursing Station       ACTIVITY  As tolerated and directed by skilled nursing.  Weight bearing as tolerated    CONSULTATIONS  None    PROCEDURES  None    LABORATORY  Lab Results   Component Value Date    SODIUM 133 (L) 08/29/2023    POTASSIUM 3.8 08/29/2023    CHLORIDE 100 08/29/2023    CO2 24 08/29/2023    GLUCOSE 113 (H) 08/29/2023    BUN 9 08/29/2023    CREATININE 0.47 (L) 08/29/2023        Lab Results   Component Value Date    WBC 5.5 08/29/2023    HEMOGLOBIN 12.4 (L) 08/29/2023    HEMATOCRIT 38.2 (L) 08/29/2023    PLATELETCT 150 (L) 08/29/2023      Please note that this dictation was created using voice recognition software. I have made every reasonable attempt to correct obvious errors, but I expect that there are errors of grammar and possibly context that  I did not discover before finalizing the note.    Total time of the discharge process exceeds 35 minutes.

## 2023-08-30 NOTE — CARE PLAN
The patient is Watcher - Medium risk of patient condition declining or worsening    Shift Goals  Clinical Goals: wound care  Patient Goals: rest  Family Goals: BECKY    Progress made toward(s) clinical / shift goals: progressing      Problem: Psychosocial Needs:  Goal: Ability to cope will improve  Intervention: Provide emotional support  Note: Emotional support provided as needed. Pt calm and pleasant.      Problem: Skin Integrity  Goal: Skin integrity is maintained or improved  Note: Skin integrity assessed and continued through shift. Skin kept dry and intact. Dressings dry, clean and intact.         Problem: Knowledge Deficit - Standard  Goal: Patient and family/care givers will demonstrate understanding of plan of care, disease process/condition, diagnostic tests and medications  Outcome: Not Met  Note: Pt not oriented and unable to retain teaching. Reorienting completed.

## 2023-10-26 NOTE — TELEPHONE ENCOUNTER
Phone Number Called: [phone] (home)       Call outcome: Spoke to patient regarding message below.    Message: Pt niece (santa jewell) left vm stating that [facility] medication ( apixaban (ELIQUIS) 5mg Tab) has ran out and the brand medication cost is 1700$ she is requesting that you send in a generic brand to see if that is cheaper pt has an appt scheduled for 11/6 for a TCM visit please advise

## 2023-10-31 NOTE — TELEPHONE ENCOUNTER
Spoke with pt niece (santa) she stated pt was in a skilled facility to recover from fall while there pt was on tramadol for pain  the nurses stated he should continue taking medication for pain as needed. Pt niece states she has been giving him tylenol and pain is under control and they will discuss more at visit on 11/6.

## 2023-11-06 PROBLEM — Z86.711 PERSONAL HISTORY OF PULMONARY EMBOLISM: Status: ACTIVE | Noted: 2023-01-01

## 2023-11-06 PROBLEM — E87.6 HYPOKALEMIA: Status: RESOLVED | Noted: 2023-01-01 | Resolved: 2023-01-01

## 2023-11-06 PROBLEM — E87.0 HYPERNATREMIA: Status: RESOLVED | Noted: 2023-01-01 | Resolved: 2023-01-01

## 2023-11-06 PROBLEM — M62.81 MUSCLE WEAKNESS (GENERALIZED): Status: ACTIVE | Noted: 2023-01-01

## 2023-11-06 PROBLEM — R26.2 DIFFICULTY IN WALKING, NOT ELSEWHERE CLASSIFIED: Status: ACTIVE | Noted: 2023-01-01

## 2023-11-06 PROBLEM — L89.214 PRESSURE ULCER OF RIGHT HIP, STAGE 4 (HCC): Status: ACTIVE | Noted: 2023-01-01

## 2023-11-06 PROBLEM — H35.30 UNSPECIFIED MACULAR DEGENERATION: Status: ACTIVE | Noted: 2023-01-01

## 2023-11-06 PROBLEM — E16.2 HYPOGLYCEMIA: Status: RESOLVED | Noted: 2023-01-01 | Resolved: 2023-01-01

## 2023-11-06 PROBLEM — H61.23 BILATERAL IMPACTED CERUMEN: Status: RESOLVED | Noted: 2019-01-08 | Resolved: 2023-01-01

## 2023-11-06 PROBLEM — E43 UNSPECIFIED SEVERE PROTEIN-CALORIE MALNUTRITION (HCC): Status: ACTIVE | Noted: 2023-01-01

## 2023-11-06 PROBLEM — N13.9 OBSTRUCTIVE AND REFLUX UROPATHY, UNSPECIFIED: Status: ACTIVE | Noted: 2023-01-01

## 2023-11-06 PROBLEM — H92.02 EAR PAIN, LEFT: Status: RESOLVED | Noted: 2023-01-01 | Resolved: 2023-01-01

## 2023-11-06 PROBLEM — R79.89 ELEVATED TROPONIN: Status: RESOLVED | Noted: 2023-01-01 | Resolved: 2023-01-01

## 2023-11-06 PROBLEM — E83.39 HYPOPHOSPHATEMIA: Status: RESOLVED | Noted: 2023-01-01 | Resolved: 2023-01-01

## 2023-11-06 PROBLEM — K21.9 GASTRO-ESOPHAGEAL REFLUX DISEASE WITHOUT ESOPHAGITIS: Status: ACTIVE | Noted: 2023-01-01

## 2023-11-07 PROBLEM — Z71.89 ACP (ADVANCE CARE PLANNING): Status: RESOLVED | Noted: 2023-01-01 | Resolved: 2023-01-01

## 2023-11-07 PROBLEM — S09.93XA FACIAL TRAUMA: Status: RESOLVED | Noted: 2023-01-01 | Resolved: 2023-01-01

## 2023-11-07 NOTE — ASSESSMENT & PLAN NOTE
- Follow-up with pharmacotherapy for assistance with affordable anticoagulant.  -Patient will continue anticoagulation for at least 3 months but possibly up to 6 months, clot was provoked, patient does have risk for bleeding

## 2023-11-07 NOTE — ASSESSMENT & PLAN NOTE
- Complete ordered labs  -Follow-up in 1 month  -Continue home PT and home health  -POLST complete per patient

## 2023-11-07 NOTE — PROGRESS NOTES
Subjective:     Chief Complaint   Patient presents with    [facility]     PT HAD A FALL WAS ON FLOOR FOR A FEW DAYS AND HAD BLOOD CLOTS AND PRESSURE SORES ON HIP        HPI:   Moshe presents today with     Problem   Gastro-Esophageal Reflux Disease Without Esophagitis   Unspecified Severe Protein-Calorie Malnutrition (Hcc)    Patient and family are working on increasing fluid intake patient has had a very low appetite, they are adding items with increased protein and will be adding an Ensure 1-2 times daily.     Difficulty in Walking, Not Elsewhere Classified   Obstructive and Reflux Uropathy, Unspecified   Muscle Weakness (Generalized)   Personal History of Pulmonary Embolism   Pressure Ulcer of Right Hip, Stage 4 (Hcc)    Continued issue, is overall healing, patient does have an appointment with wound care this afternoon.      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  Feeling overall well.  No increased shortness of breath, states that he is fatiguing more easily with exercise.  No cough.  States that he is taking Eliquis as prescribed twice daily.  As this clot was provoked and he does not have any previous history of blood clot we will likely continue anticoagulation for 3 to 6 months.  Patient is unable to afford Eliquis due to it being $1700 for 3-month supply.  Discussion with pharmacy, referral to anticoagulation placed for assistance with affordable medication.      Swelling of Right Hand    Swelling continues to be noted, patient and niece state that it is improved from previous.     Urinary Retention    This is a continued issue since hospitalization.  Patient is working with urology, they have a scheduled trial without the catheter, patient is taking Flomax and finasteride prescribed by urology.  Patient is experiencing dizziness and lightheadedness which they associate with the Flomax.  Urine is clear yellow without sediment in  bag during visit.     Traumatic Rhabdomyolysis (Hcc)    This is a new issue to this provider.  We are seeing him for sequela after a fall where the patient was down for multiple days.  He is overall feeling well and is continuing to notice some swelling in his right hand.  States that he does continue to feel weak and is walking short distances.  Is what working with physical therapy at home.  Does appear fatigued today.  Niece mentions that patient has been more dizzy and shaky, states that the Flomax does make him feel tired and dizzy as well.  He did have some mild anemia in the hospital which we are going to reassess. Per niece he is doing well since being in skilled nursing facility.     Hypoglycemia (Resolved)   Acp (Advance Care Planning) (Resolved)   Elevated Troponin (Resolved)   Hypernatremia (Resolved)   Hypokalemia (Resolved)   Hypophosphatemia (Resolved)   Facial Trauma (Resolved)   Ear Pain, Left (Resolved)    States started on the left side and now he is having pain in his ears and in the left jaw. States over the last week started having pain on both sides. No fever or chills. No nausea/vomiting. Has not been swimming recently.     Bilateral Impacted Cerumen (Resolved)    This is an intermittent issue for this patient.  Patient is here today with severe bilateral ear pain which started on the left and then moved to the right patient states that it has been traveling down his jaw and that he is worried about infection.  Denies any fevers, chills, nausea vomiting or urinary symptoms.  He has to frequently have cerumen impaction removed related to ear pain and pressure, decreased hearing.  Assessment today indicates bilateral cerumen impaction which is removed by provider after flushing by MA.      Procedure: Cerumen Removal  Risks and benefits of procedure discussed with patient.  Cerumen softened with lavage, removed by reynold.   Patient tolerated the procedure well  Pt educated about proper care of  ear canal. Q-tip cleaning discouraged, use of debrox and warm water lavage discussed.  Post lavage curette performed by provider, Post procedure exam with clear canal and normal TM.     Procedure no evidence of ear infection, there is bilateral effusion, redness, no inflammation no appearance of pus.           Extensive discussion regarding urology follow-up, possible causes for dizziness, concern for anemia and recommendation to update labs.    Current Outpatient Medications Ordered in Epic   Medication Sig Dispense Refill    finasteride (PROSCAR) 5 MG Tab Take 5 mg by mouth at bedtime.      tamsulosin (FLOMAX) 0.4 MG capsule TAKE 1 CAPSULE BY MOUTH EVERY DAY AT BEDTIME      apixaban (ELIQUIS) 5mg Tab Take 1 Tablet by mouth 2 times a day. GENERIC ONLY 60 Tablet 0    simvastatin (ZOCOR) 80 MG tablet TAKE 1 TABLET BY MOUTH AT BEDTIME 100 Tablet 0    omeprazole (PRILOSEC) 20 MG delayed-release capsule Take 20 mg by mouth every day.      Multiple Vitamins-Minerals (CENTRUM ADULTS PO) Take 1 Tablet by mouth every day.      Cholecalciferol (D3-50 PO) Take 1 Tablet by mouth every day.      Sodium Hypochlorite (DAKINS 0.125%, 1/4 STRENGTH,) 0.125 % Solution Apply 10 mL topically 2 times a day.       No current [facility]-ordered facility-administered medications on file.       Health Maintenance: Declines vaccines today    Review of Systems   Constitutional:  Negative for chills, fever and malaise/fatigue.   Respiratory:  Negative for cough, shortness of breath and wheezing.    Cardiovascular:  Negative for chest pain, palpitations and leg swelling.   Genitourinary:  Negative for dysuria and urgency.   Musculoskeletal:  Negative for myalgias and neck pain.   Neurological:  Negative for dizziness and headaches.   Psychiatric/Behavioral:  Negative for depression and suicidal ideas. The patient is not nervous/anxious.          Objective:     Exam:  /62 (BP Location: Left arm, Patient Position: Sitting, BP Cuff Size: Adult)    "Pulse 81   Temp 36.1 °C (96.9 °F) (Temporal)   Ht 1.854 m (6' 1\")   Wt 63.1 kg (139 lb 3.2 oz)   SpO2 98%   BMI 18.37 kg/m²  Body mass index is 18.37 kg/m².    Physical Exam  Constitutional:       Appearance: Normal appearance.   HENT:      Head: Normocephalic and atraumatic.   Cardiovascular:      Rate and Rhythm: Normal rate and regular rhythm.      Pulses: Normal pulses.      Heart sounds: Normal heart sounds.   Pulmonary:      Effort: Pulmonary effort is normal.      Breath sounds: Normal breath sounds.   Skin:     General: Skin is warm and dry.      Capillary Refill: Capillary refill takes 2 to 3 seconds.   Neurological:      General: No focal deficit present.      Mental Status: He is alert and oriented to person, place, and time.   Psychiatric:         Mood and Affect: Mood normal.         Behavior: Behavior normal.       Assessment & Plan:     88 y.o. male with the following -     Problem List Items Addressed This Visit       Hyperlipidemia    Relevant Orders    Lipid Profile    Pressure ulcer of right hip, stage 4 (HCC)     - Follow-up with wound care to assist with healing.         Pulmonary emboli (HCC)     - Follow-up with pharmacotherapy for assistance with affordable anticoagulant.  -Patient will continue anticoagulation for at least 3 months but possibly up to 6 months, clot was provoked, patient does have risk for bleeding         Relevant Orders    Referral to Anticoagulation Monitoring    Swelling of right hand    Traumatic rhabdomyolysis (HCC)     - Complete ordered labs  -Follow-up in 1 month  -Continue home PT and home health  -POLST complete per patient         Unspecified severe protein-calorie malnutrition (HCC)     - Monitor weight.         Urinary retention     - Continue close follow-up with urology          Other Visit Diagnoses       Hyponatremia        Relevant Orders    CBC WITH DIFFERENTIAL    Comp Metabolic Panel          Patient was seen and examined today and does require the " assistance of home health including nursing care for dressing changes, assistance with activities of daily living.  He also requires home physical therapy due to limited mobility and difficulty leaving the house.    I spent a total of 42 minutes with record review, exam, communication with the patient, communication with other providers, and documentation of this encounter.      Return in about 1 month (around 12/6/2023), or if symptoms worsen or fail to improve.    I have placed the below orders and discussed them with an approved delegating provider. The MA is performing the below orders under the direction of Dr. Cagle.     Please note that this dictation was created using voice recognition software. I have made every reasonable attempt to correct obvious errors, but I expect that there are errors of grammar and possibly content that I did not discover before finalizing the note.

## 2023-11-08 NOTE — PATIENT INSTRUCTIONS
-Keep dressings clean and dry. Change dressings if they become over saturated, soiled or fall off. Otherwise, your home health nurse will change your dressings between wound clinic visits.     -If you need to change your dressings at home: Wash your wound with normal saline, wound cleanser, or unscented soap and water prior to applying your new dressings. Please avoid cleansing with hydrogen peroxide or rubbing alcohol.    -Avoid prolonged standing or sitting without elevating your legs.    -Should you experience any significant changes in your wounds, such as signs of infection (increasing redness, swelling, localized heat, increased pain, fever > 101 F, chills) or have any questions regarding your home care instructions, please contact the wound center at [phone]. If after hours, contact your primary care physician or go to the hospital emergency room.     -If you are 5 or more minutes late for an appointment, we reserve the right to cancel and reschedule that appointment. Additionally, if you are habitually late or not showing (3 late cancellations and/or no shows), we reserve the right to cancel your remaining appointments and it will be your responsibility to obtain a new referral if services are still needed.

## 2023-11-08 NOTE — PROGRESS NOTES
Provider Encounter- Pressure Injury        HISTORY OF PRESENT ILLNESS  Wound History:    START OF CARE IN CLINIC: 11/7/2023    REFERRING PROVIDER: Edwin De Leon     WOUND- Pressure Injury.    LOCATION AND STAGE: Right hip, stage IV   HISTORY: Patient was found down at home by family on 8/25/2023, it was unknown how long he had been down, possibly several days.  He was brought into the ED and was admitted.  He was found to be confused, and had several areas of skin breakdown to the right side of his body and to his chest.  He is found to have traumatic rhabdomyolysis.  He was seen by the inpatient wound team, who initiated treatment to pressure injuries to his right cheek, right chest, right hand, right hip, sacrum, right lateral knee, left medial knee and right lateral ankle.  Once stabilized, he was transferred to a skilled nursing facility until family took him home towards the end of October.  He now lives with his niece and her  who provide 24/7 care.  Nieces  is a retired OR nurse.  Home health has been seeing patient several times per week for wound care.    Pertinent Medical History: Protein calorie malnutrition, falls, personal history of pulmonary embolism, LBBB, elevated PSA, difficulty walking, bilateral hip replacements   Contributing factors: Immobility -requires assist for mobilization, uses FWW.  Activity level:Mostly sedentary, ambulates short distances with assist.  Support surfaces: Currently on a regular mattress.  Incontinence: Indwelling catheter for urine, incontinence of stool.  Nutritional state: He is underweight. Caregiver assistance: Family members take care of him 24/7. Moisture: Occasionally from loose stool       TOBACCO USE: Former smoker    Patient's problem list, allergies, and current medications reviewed and updated in Epic    Interval History:  11/7/2023 Initial clinic visit with BRICE Mcallister, FNP-BC, CWSHOLAN, CFCN.   Patient presents today accompanied by his  niece and her .  Niece provides most of his health information.  Most of the wounds noted during hospitalization have resolved, with the exception of his right hip pressure injury.  He does have a small open wound to his neck which he is states is notable skin cancer.  Patient does not want to see dermatology he is transported into the clinic today in a wheelchair.  Required assist to transfer to [facility].   Per family members, patient spends most of his time either in bed or in his recliner chair.  He often complains of sacral pain.  Sacrum is slightly red but skin is intact.  They have been seeing an inflatable donut under his sacrum when he is sitting.  I advised them to discontinue, recommended Roho mosaic cushion instead.  They feel he is eating well, they have been trying to feed him high-protein meals, niece has recently purchased Ensure supplemental shakes.  He is also taking a multivitamin.    REVIEW OF SYSTEMS:   Review of Systems   Unable to perform ROS: Dementia       PHYSICAL EXAMINATION:   /53   Pulse 74   Temp 36.4 °C (97.6 °F) (Temporal)   Resp 16   SpO2 95%     Physical Exam  Constitutional:       Appearance: He is ill-appearing.      Comments: Underweight     HENT:      Head:      Comments: Very hard of hearing  Cardiovascular:      Rate and Rhythm: Normal rate.   Pulmonary:      Effort: Pulmonary effort is normal.   Skin:     Comments: Stage IV pressure injury to right hip-thick layer of slough to wound bed, undermining from 1-4 o'clock, periwound with purpura, moderate serosanguineous drainage, no odor, no evidence of wound infection    Shallow open wound to neck-reportedly skin cancer, no debridement    Scarring to chest and right hand   Neurological:      Comments: Relies on family members to provide health information           WOUND ASSESSMENT    Wound 08/27/23 Pressure Injury Hip Right --Right Hip (Active)   Wound Image     11/07/23 1530   Site Assessment Red;Yellow 11/07/23  1530   Periwound Assessment Ecchymosis 11/07/23 1530   Margins Unattached edges 11/07/23 1530   Drainage Amount Moderate 11/07/23 1530   Drainage Description Serosanguineous 11/07/23 1530   Treatments Cleansed;Topical Lidocaine;Provider debridement 11/07/23 1530   Wound Cleansing Hypochlorus Acid 11/07/23 1530   Periwound Protectant No-sting Skin Prep;Barrier Paste 11/07/23 1530   Dressing Changed New 11/07/23 1530   Dressing Cleansing/Solutions Normal Saline 11/07/23 1530   Dressing Options Collagen Dressing;Hydrofiber Silver;Offloading Dressing - Sacral 11/07/23 1530   Dressing Change/Treatment Frequency Every 72 hrs, and As Needed 11/07/23 1530   WOUND NURSE ONLY - Pressure Injury Stage 4 11/07/23 1530   Wound Length (cm) 2 cm 11/07/23 1530   Wound Width (cm) 1.5 cm 11/07/23 1530   Wound Depth (cm) 0.5 cm 11/07/23 1530   Wound Surface Area (cm^2) 3 cm^2 11/07/23 1530   Wound Volume (cm^3) 1.5 cm^3 11/07/23 1530   Post-Procedure Length (cm) 2 cm 11/07/23 1530   Post-Procedure Width (cm) 1.7 cm 11/07/23 1530   Post-Procedure Depth (cm) 0.5 cm 11/07/23 1530   Post-Procedure Surface Area (cm^2) 3.4 cm^2 11/07/23 1530   Post-Procedure Volume (cm^3) 1.7 cm^3 11/07/23 1530   Tunneling (cm) 0 cm 11/07/23 1530   Undermining (cm) 0.5 cm 11/07/23 1530   Undermining of Wound, 1st Location From 1 o'clock;To 4 o'clock 11/07/23 1530   Wound Odor None 11/07/23 1530   Exposed Structures None 11/07/23 1530   Number of days: 72       Wound 11/07/23 Full Thickness Wound Throat Anterior --MIdline/Anterior Throat/Neck (Active)   Wound Image   11/07/23 1530   Site Assessment Red 11/07/23 1530   Periwound Assessment Intact 11/07/23 1530   Margins Attached edges 11/07/23 1530   Drainage Amount Small 11/07/23 1530   Drainage Description Serosanguineous 11/07/23 1530   Treatments Cleansed 11/07/23 1530   Wound Cleansing Normal Saline Irrigation 11/07/23 1530   Periwound Protectant No-sting Skin Prep 11/07/23 1530   Dressing Changed New  "23 153   Dressing Cleansing/Solutions Not Applicable 23 153   Dressing Options Adaptic;Hydrofiber Silver;Silicone Adhesive Foam 23   Dressing Change/Treatment Frequency Every 72 hrs, and As Needed 23   Non-staged Wound Description Full thickness 23   Wound Length (cm) 1.5 cm 23 153   Wound Width (cm) 1.8 cm 23 153   Wound Depth (cm) 0.1 cm 23 153   Wound Surface Area (cm^2) 2.7 cm^2 23   Wound Volume (cm^3) 0.27 cm^3 23   Tunneling (cm) 0 cm 23   Undermining (cm) 0 cm 23   Wound Odor None 23   Exposed Structures None 23   Number of days: 0       PROCEDURE:   -2% viscous lidocaine applied topically to wound bed for approximately 5 minutes prior to debridement  -Curette used to debride wound bed.  Excisional debridement was performed to remove devitalized tissue until healthy, bleeding tissue was visualized.   Entire surface of wound, 3.4 cm² debrided.  Tissue debrided into the  muscle/fascia layer.    -Bleeding controlled with manual pressure.    -Wound care completed by wound RN, refer to flowsheet  -Patient tolerated the procedure well, without c/o pain or discomfort.       Pertinent Labs and Diagnostics:    Labs:     A1c: No results found for: \"HBA1C\"       IMAGIN2023-x-ray of right hip with pelvis  IMPRESSION:     1.  No radiographic evidence of acute traumatic injury.  2.  Atherosclerosis    VASCULAR STUDIES: N/A    LAST  WOUND CULTURE:  DATE :    Lab Results   Component Value Date/Time    CULTRSULT - (A) 2023 03:20 PM    CULTRSULT Klebsiella pneumoniae  >100,000 cfu/mL   (A) 2023 03:20 PM           ASSESSMENT AND PLAN:     1. Pressure injury of right hip, stage 4 (HCC)    2023: Patient sustained multiple pressure injuries, was found down at home for unknown length of time in August of this year.  Most of his wounds have gone on to heal with the " exception of right hip pressure injury.  -Excisional debridement of wound in clinic today, medically necessary to promote wound healing.  -Patient to return to clinic weekly for assessment and debridement  -Home health to change dressing 2 times per week in between clinic visits   -Consider wound VAC if wound stalls or deteriorates  -Offloading strategies discussed with patient's family in clinic today.  -Patient does spend a lot of time sitting in recliner chair, complains of sacral pain.  He is at high risk for further skin breakdown.  Appropriate offloading cushion, Roho mosaic, recommended.  Family can purchase this from [employer]  -Consider VAC to accelerate closure of this wound.  May need to wait until after Thanksgiving holiday due to scheduling challenges in clinic    Wound care: Collagen into undermining to accelerate granulation, silver Hydrofiber to manage exudate, foam cover dressing, Hypafix tape     2. Open wound of neck, subsequent encounter    11/7/2023: Per niece, patient has had this wound for a long time.  She believes it has been diagnosed as a skin cancer.  Patient refuses to follow-up with dermatology  -Dressing applied, no debridement  -Assess each clinic visit    Wound care: Adaptic contact layer, Silver Hydrofiber to manage exudate and bioburden, foam cover dressing, Hypafix tape     3. Personal history of fall  4. 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  He is physically debilitated, relies on family for most of his needs  -Uses front wheel walker for ambulation, with assist  -He is at high risk for further falls          5. Unspecified severe protein-calorie malnutrition (HCC)    11/7/2023: Patient is underweight with an MICHAEL around 18.  Per family he has never been overweight, but also not distant.  -Family has been providing him with regular meals, with emphasis on  protein  -Family will begin supplementing patient's diet with protein shakes, recently purchased           PATIENT EDUCATION  -Etiology of pressure injury  -Importance of offloading and frequent repositioning  -Strategies for offloading in bed and when seated discussed and demonstrated  - Importance of adequate nutrition for wound healing  - Advised to go to ER for any increased redness, swelling, drainage or odor, or if patient develops fever, chills, nausea or vomiting.    My total time spent caring for the patient on the day of the encounter was 30 minutes.   This does not include time spent on separately billable procedures/tests.       Please note that this note may have been created using voice recognition software. I have worked with technical experts from [facility] to optimize the interface.  I have made every reasonable attempt to correct obvious errors, but there may be errors of grammar and possibly content that I did not discover before finalizing the note.    N

## 2023-11-08 NOTE — PROGRESS NOTES
I called on the recommendation of the patient's Primary Care Provider to present the services available through the Personal Care Management. The patient's niece states that she is on her way out for appointments and requests a call back at 4 pm.     11/13/23 208 pm: spoke with patient's niece over the phone. She is out running errands. We arranged for me to send information on the program through [employer] with my contact information. She asked if we provided resources for care while family members were out of town as they were hoping to go on a trip to see their grandson in a couple of weeks. I told her that we do help connect with respite services but that it generally isn't continuous care extending over days and that what we do have would likely take more than 2 weeks to get rolling. She voiced understanding.

## 2023-11-10 NOTE — TELEPHONE ENCOUNTER
Call patient and advise him that I spoke with a specialist regarding the anemia situation and the specialist does want Ricky to continue the Eliquis as prescribed at this time.  They also want to see him in their office as such they should be receiving a phone call to get him scheduled.  Please advise Deidre that I would like repeat CBC completed Monday.

## 2023-11-14 NOTE — PROGRESS NOTES
Pt missed his anticoag visit today. Called pt and spoke with his relative. She thought his appt was next Monday. Of note, he is scheduled for a vascular appt with Dr Lewis on Friday. Pt would like to have a virtual visit which I have noted. Will f/u with pt on Friday to determine LOT.    Swathi NARVAEZ

## 2023-11-16 NOTE — PROGRESS NOTES
Provider Encounter- Pressure Injury        HISTORY OF PRESENT ILLNESS  Wound History:    START OF CARE IN CLINIC: 11/7/2023    REFERRING PROVIDER: Edwin De Leon     WOUND- Pressure Injury.    LOCATION AND STAGE: Right hip, stage IV   HISTORY: Patient was found down at home by family on 8/25/2023, it was unknown how long he had been down, possibly several days.  He was brought into the ED and was admitted.  He was found to be confused, and had several areas of skin breakdown to the right side of his body and to his chest.  He is found to have traumatic rhabdomyolysis.  He was seen by the inpatient wound team, who initiated treatment to pressure injuries to his right cheek, right chest, right hand, right hip, sacrum, right lateral knee, left medial knee and right lateral ankle.  Once stabilized, he was transferred to a skilled nursing facility until family took him home towards the end of October.  He now lives with his niece and her  who provide 24/7 care.  Nieces  is a retired OR nurse.  Home health has been seeing patient several times per week for wound care.    Pertinent Medical History: Protein calorie malnutrition, falls, personal history of pulmonary embolism, LBBB, elevated PSA, difficulty walking, bilateral hip replacements   Contributing factors: Immobility -requires assist for mobilization, uses FWW.  Activity level:Mostly sedentary, ambulates short distances with assist.  Support surfaces: Currently on a regular mattress.  Incontinence: Indwelling catheter for urine, incontinence of stool.  Nutritional state: He is underweight. Caregiver assistance: Family members take care of him 24/7. Moisture: Occasionally from loose stool       TOBACCO USE: Former smoker    Patient's problem list, allergies, and current medications reviewed and updated in Epic    Interval History:  11/7/2023 Initial clinic visit with BRICE Mcallister, FNP-BC, CWSHOLAN, CFCN.   Patient presents today accompanied by his  niece and her .  Niece provides most of his health information.  Most of the wounds noted during hospitalization have resolved, with the exception of his right hip pressure injury.  He does have a small open wound to his neck which he is states is notable skin cancer.  Patient does not want to see dermatology he is transported into the clinic today in a wheelchair.  Required assist to transfer to [facility].   Per family members, patient spends most of his time either in bed or in his recliner chair.  He often complains of sacral pain.  Sacrum is slightly red but skin is intact.  They have been seeing an inflatable donut under his sacrum when he is sitting.  I advised them to discontinue, recommended Roho mosaic cushion instead.  They feel he is eating well, they have been trying to feed him high-protein meals, niece has recently purchased Ensure supplemental shakes.  He is also taking a multivitamin.    11/15/2023 : Clinic visit with Kimberly Stewart, BRICE, RENÉ, DONN, CFPRITESH.   Patient presents today with his niece and her .  He is able to answer simple questions, but defers to his family to provide most of his information.  His hip wound has improved, depth is decreased.  Family states he is eating well.  Due to progress of wound, they would like to hold off on VAC for now.   Patient has an appointment with urology tomorrow.  Plan to trial DC of indwelling catheter planned.  If unsuccessful, family is not certain what next step would be-i.e. suprapubic or continue with containment garments.    REVIEW OF SYSTEMS:   Review of Systems   Unable to perform ROS: Dementia       PHYSICAL EXAMINATION:   /53   Pulse 76   Temp 35.8 °C (96.5 °F) (Temporal)   Resp 18   SpO2 100%     Physical Exam  Constitutional:       Appearance: He is ill-appearing.      Comments: Underweight     HENT:      Head:      Comments: Very hard of hearing  Cardiovascular:      Rate and Rhythm: Normal rate.   Pulmonary:       Effort: Pulmonary effort is normal.   Skin:     Comments: Stage IV pressure injury to right hip-wound area and depth have decreased, slough to wound bed, moderate serosanguineous drainage, no odor.  Periwound with purple discoloration-venous congestion.,    Shallow open wound to neck-reportedly skin cancer, no debridement    Scarring to chest and right hand   Neurological:      Comments: Relies on family members to provide health information           WOUND ASSESSMENT    Wound 08/27/23 Pressure Injury Hip Right --Right Hip (Active)   Wound Image    11/15/23 1600   Site Assessment Red;Yellow 11/15/23 1600   Periwound Assessment Intact;Ecchymosis;Fragile 11/15/23 1600   Margins Unattached edges 11/15/23 1600   Drainage Amount Moderate 11/15/23 1600   Drainage Description Serosanguineous 11/15/23 1600   Treatments Cleansed;Topical Lidocaine;Provider debridement 11/15/23 1600   Wound Cleansing Hypochlorus Acid 11/15/23 1600   Periwound Protectant No-sting Skin Prep;Barrier Paste 11/15/23 1600   Dressing Changed Changed 11/15/23 1600   Dressing Cleansing/Solutions Normal Saline 11/15/23 1600   Dressing Options Collagen Dressing;Hydrofiber Silver;Silicone Adhesive Foam 11/15/23 1600   Dressing Change/Treatment Frequency Every 72 hrs, and As Needed 11/15/23 1600   WOUND NURSE ONLY - Pressure Injury Stage 4 11/15/23 1600   Wound Length (cm) 1.9 cm 11/15/23 1600   Wound Width (cm) 1.5 cm 11/15/23 1600   Wound Depth (cm) 0.2 cm 11/15/23 1600   Wound Surface Area (cm^2) 2.85 cm^2 11/15/23 1600   Wound Volume (cm^3) 0.57 cm^3 11/15/23 1600   Post-Procedure Length (cm) 2 cm 11/15/23 1600   Post-Procedure Width (cm) 1.5 cm 11/15/23 1600   Post-Procedure Depth (cm) 0.2 cm 11/15/23 1600   Post-Procedure Surface Area (cm^2) 3 cm^2 11/15/23 1600   Post-Procedure Volume (cm^3) 0.6 cm^3 11/15/23 1600   Wound Healing % 62 11/15/23 1600   Tunneling (cm) 0 cm 11/07/23 1530   Undermining (cm) 0.9 cm 11/15/23 1600   Undermining of Wound,  1st Location From 12 o'clock;To 5 o'clock 11/15/23 1600   Wound Odor None 11/15/23 1600   Exposed Structures None 11/07/23 1530   Number of days: 80       Wound 11/07/23 Full Thickness Wound Throat Anterior --MIdline/Anterior Throat/Neck (Active)   Wound Image   11/15/23 1600   Site Assessment Pink;Red;Fragile 11/15/23 1600   Periwound Assessment Intact 11/15/23 1600   Margins Attached edges 11/15/23 1600   Drainage Amount Moderate 11/15/23 1600   Drainage Description Serosanguineous 11/15/23 1600   Treatments Cleansed 11/15/23 1600   Wound Cleansing Normal Saline Irrigation 11/15/23 1600   Periwound Protectant Skin Protectant Wipes to Periwound 11/15/23 1600   Dressing Changed Changed 11/15/23 1600   Dressing Cleansing/Solutions Not Applicable 11/15/23 1600   Dressing Options Adaptic;Hydrofiber Silver;Silicone Adhesive Foam 11/15/23 1600   Dressing Change/Treatment Frequency Every 72 hrs, and As Needed 11/15/23 1600   Non-staged Wound Description Full thickness 11/15/23 1600   Wound Length (cm) 1 cm 11/15/23 1600   Wound Width (cm) 2.3 cm 11/15/23 1600   Wound Depth (cm) 0.1 cm 11/15/23 1600   Wound Surface Area (cm^2) 2.3 cm^2 11/15/23 1600   Wound Volume (cm^3) 0.23 cm^3 11/15/23 1600   Wound Healing % 15 11/15/23 1600   Tunneling (cm) 0 cm 11/15/23 1600   Undermining (cm) 0 cm 11/15/23 1600   Wound Odor None 11/15/23 1600   Exposed Structures None 11/15/23 1600   Number of days: 8       PROCEDURE:   -2% viscous lidocaine applied topically to wound bed for approximately 5 minutes prior to debridement  -Curette used to debride wound bed.  Excisional debridement was performed to remove devitalized tissue until healthy, bleeding tissue was visualized.   Entire surface of wound, 3.0 cm² debrided.  Tissue debrided into the subcutaneous layer.    -Bleeding controlled with manual pressure.    -Wound care completed by wound RN, refer to flowsheet  -Patient tolerated the procedure well, without c/o pain or discomfort.  "      Pertinent Labs and Diagnostics:    Labs:     A1c: No results found for: \"HBA1C\"       IMAGIN2023-x-ray of right hip with pelvis  IMPRESSION:     1.  No radiographic evidence of acute traumatic injury.  2.  Atherosclerosis    VASCULAR STUDIES: N/A    LAST  WOUND CULTURE:  DATE :    Lab Results   Component Value Date/Time    CULTRSULT  2023 11:15 AM     Mixed enteric gato >100,000 cfu/mL  Greater than 3 organisms isolated, culture of doubtful  significance, please recollect.             ASSESSMENT AND PLAN:     1. Pressure injury of right hip, stage 4 (HCC)    11/15/2023: Area and depth of wound have decreased since last assessment.  -Excisional debridement of wound in clinic today, medically necessary to promote wound healing.  -Patient to return to clinic weekly for assessment and debridement  -Home health to change dressing 2 times per week in between clinic visits   -VAC was discussed on initial visit.  Family would like to hold off as his wound is currently progressing well.  -Offloading strategies discussed with patient's niece and her  last visit.  They both appear to be well versed,  is a retired nurse  -We had recommended family purchase a edo mosaic cushion last visit.  I did not asked today if he had done so.      Wound care: Collagen into undermining to accelerate granulation, silver Hydrofiber to manage exudate, foam cover dressing, Hypafix tape     2. Open wound of neck, subsequent encounter    11/15/2023: Per family, patient has had this wound for a long time.  It has been diagnosed as skin cancer, was being followed by dermatology.  Patient has stated he wants no further intervention this wound  -Dressing applied, no debridement  -Assess each clinic visit    Wound care: Adaptic contact layer, Silver Hydrofiber to manage exudate and bioburden, foam cover dressing, Hypafix tape     3. Personal history of fall  4. Age-related physical debility    11/15/2023: Patient " fell at home in August of this year, was down for unknown length of time, sustaining multiple pressure injuries.  He is physically debilitated, relies on family for most of his needs  -Uses front wheel walker for ambulation, with assist  -He is under family supervision 24/7.  They state he does not try to ambulate on his own        5. Unspecified severe protein-calorie malnutrition (HCC)    11/15/2023: Patient is quite underweight.  His family states his appetite has been good, they are assuring he is getting adequate proteins and calories.  -Family has been providing him with regular meals, with emphasis on protein  -He is receiving supplemental protein shakes daily          PATIENT EDUCATION  -Etiology of pressure injury  -Importance of offloading and frequent repositioning  -Strategies for offloading in bed and when seated discussed and demonstrated  - Importance of adequate nutrition for wound healing  - Advised to go to ER for any increased redness, swelling, drainage or odor, or if patient develops fever, chills, nausea or vomiting.        Please note that this note may have been created using voice recognition software. I have worked with technical experts from [employer] to optimize the interface.  I have made every reasonable attempt to correct obvious errors, but there may be errors of grammar and possibly content that I did not discover before finalizing the note.    N

## 2023-11-16 NOTE — PATIENT INSTRUCTIONS
-Keep your wound dressing clean, dry, and intact.    -Change your dressing if it becomes soiled, soaked, or falls off.    -Should you experience any significant changes in your wound(s), such as infection (redness, swelling, localized heat, increased pain, fever > 101 F, chills) or have any questions regarding your home care instructions, please contact the wound center at [phone]. If after hours, contact your primary care physician or go to the hospital emergency room.

## 2023-11-16 NOTE — WOUND TEAM
HH order entered into [facility] and routed to [facility] electronically.

## 2023-11-17 PROBLEM — I95.1 ORTHOSTATIC HYPOTENSION: Status: ACTIVE | Noted: 2023-01-01

## 2023-11-17 PROBLEM — D50.9 MICROCYTIC HYPOCHROMIC ANEMIA: Status: ACTIVE | Noted: 2023-01-01

## 2023-11-17 PROBLEM — I50.22 CHRONIC HFREF (HEART FAILURE WITH REDUCED EJECTION FRACTION) (HCC): Status: ACTIVE | Noted: 2023-01-01

## 2023-11-17 PROBLEM — C61 PROSTATE CANCER (HCC): Status: ACTIVE | Noted: 2023-01-01

## 2023-11-17 PROBLEM — I70.0 AORTIC ATHEROSCLEROSIS (HCC): Status: ACTIVE | Noted: 2023-01-01

## 2023-11-17 NOTE — PROGRESS NOTES
This visit was conducted via Zoom video call using secure and encrypted video conferencing technology to reduce spread of and/or potential exposures to COVID.  The patient was in a private location (home) in the state of [place].    The patient's identity was confirmed and verbal consent was obtained for this virtual visit.     INITIAL VASCULAR ANTICOAGULATION VISIT  11/17/23     Moshe Guerrero is a 88 y.o. male who presents for initial visit   Initially referred by Lindsey Holliday* for length of therapy (LOT) determination and management of anticoagulation in context of acute venothromboembolic disease, est 111/2023    Subjective    Niece (Bo), Nephew (Kalyan) - DPOA and provides majority hx     VTE disease / Anticoagulation:   Date of initiation of anticoagulation: 8/26/23  Date of Diagnosis: same   Type of Venous thromboembolic disease (VTE): acute unilat seg to subseg PE  Initial visit hx/sx:  had GLF, found about 3 days and was confused and found .  Denies current SOB, CP, leg swelling, edema, leg pain, cyanosis of digits, redness of extremities.  Antithrombotic therapy at time of VTE event: none   Current antithrombotic agent: eliquis 5mg BID   VTE tx course: Eliquis 10mg BID x 7d, now eliquis 5mg BID   Complications: none, tolerating well, no tracey hematuria or tracey blood, no bruising, abd pain (see ROS)  Adherence: reports complete, no missed doses    _____PROVOKING FACTORS:  Any personal VTE hx? No  Any family VTE hx? No  Recent surgery ? No  Recent trauma ?  GLF at home with immobility on floor x 3 days   Smoker?  reports that he quit smoking about 20 years ago. His smoking use included cigarettes. He started smoking about 30 years ago. He has a 30.0 pack-year smoking history. He has never used smokeless tobacco.    Extended travel? No  Other periods of immobility? For 3 days after fall   Other known or potential risk factors for VTE disease:  no  MEN:  Hx of cancer or abnormal cancer screenings:  prostate CA  Hx of Testosterone therapy: no  Hypercoagulability work-up completed?  no    Patient Active Problem List   Diagnosis    Hyperlipidemia    Elevated PSA measurement    Chronic pain of right knee    Effusion, right knee    Osteoarthritis of hip    Primary osteoarthritis of one hip, right    Risk for falls    Traumatic rhabdomyolysis (HCC)    LBBB (left bundle branch block)    Swelling of right hand    Urinary retention    Pulmonary emboli (HCC)    Difficulty in walking, not elsewhere classified    Obstructive and reflux uropathy, unspecified    Gastro-esophageal reflux disease without esophagitis    Muscle weakness (generalized)    Personal history of pulmonary embolism    Pressure ulcer of right hip, stage 4 (HCC)    Unspecified macular degeneration    Unspecified severe protein-calorie malnutrition (HCC)    Prostate cancer (HCC)    Orthostatic hypotension    Chronic HFrEF (heart failure with reduced ejection fraction) (HCC)    Aortic atherosclerosis (HCC)    Microcytic hypochromic anemia      Past Surgical History:   Procedure Laterality Date    MT TOTAL HIP ARTHROPLASTY Right 11/1/2022    Procedure: ARTHROPLASTY, HIP, TOTAL, ANTERIOR APPROACH;  Surgeon: Deshawn Del Valle M.D.;  Location: SURGERY [facility];  Service: Orthopedics    HERNIA REPAIR Bilateral     inguinal x3    HIP REPLACEMENT, TOTAL Left        Current Outpatient Medications:     sulfamethoxazole-trimethoprim, 1 Tablet, Oral, BID    finasteride, 5 mg, Oral, QHS    tamsulosin, TAKE 1 CAPSULE BY MOUTH EVERY DAY AT BEDTIME    simvastatin, 80 mg, Oral, QHS    dakins 0.125% (1/4 strength), 10 mL, Topical, BID    omeprazole, 20 mg, Oral, DAILY    Multiple Vitamins-Minerals (CENTRUM ADULTS PO), 1 Tablet, Oral, DAILY    Cholecalciferol (D3-50 PO), 1 Tablet, Oral, DAILY     No Known Allergies    History reviewed. No pertinent family history.  Social History     Tobacco Use    Smoking status: Former     Current packs/day: 0.00     Average packs/day:  3.0 packs/day for 10.0 years (30.0 ttl pk-yrs)     Types: Cigarettes     Start date: 1993     Quit date: 2003     Years since quittin.8    Smokeless tobacco: Never   Vaping Use    Vaping Use: Never used   Substance Use Topics    Alcohol use: Yes     Comment: Occ     Drug use: No     DIET AND EXERCISE:  Weight Change:stable   Wt Readings from Last 3 Encounters:   23 63.1 kg (139 lb 3.2 oz)   23 70.9 kg (156 lb 4.9 oz)   23 64.5 kg (142 lb 3.2 oz)      Diet: common adult  Exercise: no regular exercise program     Review of Systems   Constitutional:  Positive for malaise/fatigue. Negative for chills and fever.   HENT:  Negative for nosebleeds.    Respiratory:  Negative for cough, hemoptysis, sputum production, shortness of breath and wheezing.    Cardiovascular:  Negative for chest pain, palpitations, orthopnea, claudication, leg swelling and PND.   Gastrointestinal:  Negative for blood in stool, melena, nausea and vomiting.   Genitourinary:  Negative for hematuria.   Musculoskeletal:  Negative for back pain and myalgias.   Neurological:  Positive for weakness.   Endo/Heme/Allergies:  Does not bruise/bleed easily.           Objective    There were no vitals filed for this visit.   BP Readings from Last 5 Encounters:   11/15/23 101/53   23 119/53   23 120/62   23 (!) 140/70   23 108/58      There is no height or weight on file to calculate BMI.  Physical Exam  Constitutional:       Appearance: He is ill-appearing (weak).   HENT:      Head: Normocephalic.   Eyes:      Extraocular Movements: Extraocular movements intact.      Conjunctiva/sclera: Conjunctivae normal.   Neurological:      General: No focal deficit present.      Mental Status: He is alert and oriented to person, place, and time.   Psychiatric:         Mood and Affect: Mood normal.         Behavior: Behavior normal.         Thought Content: Thought content normal.       Lab Results   Component Value Date  "   CHOLSTRLTOT 106 11/07/2023    LDL 56 11/07/2023    HDL 33 (A) 11/07/2023    TRIGLYCERIDE 87 11/07/2023      No results found for: \"LIPOPROTA\"   No results found for: \"APOB\"    Lab Results   Component Value Date    PROTHROMBTM 14.6 08/26/2023    INR 1.12 08/26/2023         Lab Results   Component Value Date    SODIUM 140 11/07/2023    POTASSIUM 3.1 (L) 11/07/2023    CHLORIDE 106 11/07/2023    CO2 20 11/07/2023    GLUCOSE 107 (H) 11/07/2023    BUN 27 (H) 11/07/2023    CREATININE 1.00 11/07/2023        Lab Results   Component Value Date    WBC 8.7 11/13/2023    RBC 3.65 (L) 11/13/2023    HEMOGLOBIN 9.1 (L) 11/13/2023    HEMATOCRIT 29.2 (L) 11/13/2023    MCV 80.0 (L) 11/13/2023    MCH 24.9 (L) 11/13/2023    MCHC 31.2 (L) 11/13/2023    MPV 9.9 11/13/2023      VASCULAR IMAGING:    CTPA 8/26/2023  1.  Exam is positive for isolated pulmonary embolism segmental and subsegmental segmental arteries right lower pulmonary lobe. Minimal subsegmental pulmonary embolism is noted left lower lobe.   2.  Minimal opacification of volume loss posterior lung bases could be due to atelectasis or fibrosis.   3.  Prominent size of pulmonary arteries could indicate pulmonary hypertension.   4.  Emphysema.   5.  Calcific atherosclerosis.    BLE venous 8/27/23   1) Normal (sic. Bilateral) right lower extremity superficial and deep venous examination.     RUE venous 8/27/23  Normal right upper extremity superficial and deep venous examination.     Echo 8/29/23  No prior study is available for comparison.   Mildly dilated LV with severely reduced systolic function, estimated   LVEF 30% with global hypokinesis.  Grade I LV diastolic dysfunction.  Normal RV size and systolic function  Aortic valve sclerosis without stenosis  No significant valvular abnormalities per Doppler assessment  No pericardial effusion  IVC not well-visualized  Tricuspid Valve  Structurally normal tricuspid valve without significant stenosis or   regurgitation. Right " atrial pressure is estimated to be 3 mmHg          Medical Decision Making:  Today's Assessment / Status / Plan:     1. Other acute pulmonary embolism without acute cor pulmonale (HCC)        2. Microcytic hypochromic anemia  CBC WITHOUT DIFFERENTIAL      3. Aortic atherosclerosis (HCC)        4. Chronic HFrEF (heart failure with reduced ejection fraction) (HCC)  REFERRAL TO CARDIOLOGY      5. Orthostatic hypotension        6. Prostate cancer (HCC)        7. LBBB (left bundle branch block)        8. Difficulty in walking, not elsewhere classified        9. History of pulmonary embolus (PE)      8/2023        PATIENT TYPE: Primary Prevention    Etiology of Established CVD if Present:     1) VTE disease   8/2023 - acute, bilat PEs - provoked from GLF and immobility x 3 days - currently stable  Thrombophilia/hypercoag evaluation:  not recommended  Plan:  - no imaging surveillance needed at this time  - antithrombotic plan as noted below     2) chronic HFrEF, new dx, - symptomatic fatigue, no major RIVERA, edema reported   8/2023 echo LVEF = 30% with global hypokinesis,  unclear etiology  Plan:  - ref to cardiology for further eval and GDMT options     ANTITHROMBOTIC THERAPY:  Date of initiation: 8/26/23   HAS-BLED bleeding risk calc ([email or web address]): 0 pts, 0.9%, low risk   Factors to consider for indefinite OAC: None   Last CBC, BMP: notable 5 pt drop in hgb since 8/2023   Expected duration: reviewed standard of care as per Chest 2021 guidelines is 3-6 months minimum on full dose OAC for provoked PE and has completed approximately 11 weeks  - in light of current hgb >5pt drop from baseline and unclear etiology of anemia, constitutes clinical major bleeding equivalent, and need to stop antithrombotics   - no indications for IVCF at this time unless new VTE event in future   - still possible higher risk for recurrent VTE due to prostate CA, however significant anemia and hgb drop preclude use of ASA until CBC improves    Antithrombotic therapy plan:  - stop eliquis and ASA  - stressed strict adherence to tx and avoid early termination due to increased risk for recurrent VTE  - check CBC, BMP (CMP if any underlying liver disease), and monitor CrCl as needed Q6mo while on DOAC  - counseled on signs and symptoms of acute VTE that require seeking prompt attention in the ED to include shortness of breath, chest pain, pain with deep inhalation, acute leg swelling and/or pain in calf or leg   - elevate legs as much as possible, use compression stockings/socks if directed by your provider  - Avoid hormonal therapies including estrogen or testosterone-containing meds, or raloxifene or tamoxifene (commonly used for osteoporosis)  - Avoid sedentary periods  - continue complete avoidance of tobacco products  - if having any invasive procedure,please make sure the doctor knows of your history of blood clots and current anticoagulation status  - avoid Aspirin and anti-inflammatories (eg. Advil, ibuprofen, Aleve, naproxen, etc) while anticoagulated   - avoid skiing or other dangerous activities to reduce risk of head injury and brain bleeds  - recommended to see your PCP to discuss if you need age-appropriate cancer screenings as a small % of blood clots may be caused by an underlying malignancy  - if any bleeding lasting 30min without stopping, please seek care with your PCP, urgent care, or ED  - reversal agents for most blood thinners are now available and used if you have major bleeding    LIPID MANAGEMENT:   Qualifies for Statin Therapy Based on 2018 ACC/AHA Guidelines: no  The ASCVD Risk score (Memo DK, et al., 2019) failed to calculate.  Major ASCVD events: None  High-risk conditions: None   Risk-enhancers: N/A  Currently on Statin: No  Tx goals: LDL-C <100 (consider non-HDL-C <130, apoB <90)  At goal? N/A  Plan:   - reinforced ongoing TLC measures as noted   - defer lab surveillance to PCP   Meds: none at this time    BLOOD PRESSURE  CONTROL   ACC/AHA (2017) goal <130/80  Home BP at goal: no  Office BP at goal:  n/a   Plan:   - continue healthy diet, activity, weight mgmt   Monitoring:   - routine clinic-based BP measurements at least once annually   Medications: no meds indicated at this time      GLYCEMIC STATUS:  Normal    LIFESTYLE INTERVENTIONS:    SMOKING: Stages of Change: Maintenance (sustained change >6mo)    reports that he quit smoking about 20 years ago. His smoking use included cigarettes. He started smoking about 30 years ago. He has a 30.0 pack-year smoking history. He has never used smokeless tobacco.   - continued complete avoidance of all tobacco products     PHYSICAL ACTIVITY: as tolerated based upon current health status     WEIGHT MANAGEMENT AND NUTRITION: Mediterranean style dietary approach       OTHER:    # microcytic, hypochromic anemia - down from baseline hgb 14.6 to 9.1 and now stable  Ferritin and iron studies normal   Unclear etiology - no current pain or bruising, denies abdominal pain  Per standard recs hgb drop of >2 pts would indicate clinical major bleeding equivalent and antithrombotics should be stopped - as reviewed above   Plan  - pending results of UA and stool testing through urology and PCP office, respectively   - offered option for CT scanning of abd/pelvis to eval for vascular etiologies but pt declines   - defer ongoing w/u and monitoring to PCP   - repeat CBC within 1 week  - could consider in future and/or tagged RBC scan if persistent blood loss , consider hematology    # prostate CA - ongoing care with urology, has guerin in situe     Instructed to follow-up with PCP for remainder of adult medical needs: yes  We will partner with other providers in the management of established vascular disease and cardiometabolic risk factors.    Studies to Be Obtained: none    Labs to Be Obtained: as noted above     Follow up in: vasc med prn     Peter Lewis M.D.  [facility]   [facility]   [phone]

## 2023-11-20 NOTE — PROGRESS NOTES
Received anticoagulation referral.    However, OAC was d/c'd 11/17 by Vascular Medicine.    Will 'close' referral.    Nighat Argueta, PharmD

## 2023-11-27 NOTE — PROGRESS NOTES
Provider Encounter- Pressure Injury        HISTORY OF PRESENT ILLNESS  Wound History:    START OF CARE IN CLINIC: 11/7/2023    REFERRING PROVIDER: Edwin De Leon     WOUND- Pressure Injury.    LOCATION AND STAGE: Right hip, stage IV   HISTORY: Patient was found down at home by family on 8/25/2023, it was unknown how long he had been down, possibly several days.  He was brought into the ED and was admitted.  He was found to be confused, and had several areas of skin breakdown to the right side of his body and to his chest.  He is found to have traumatic rhabdomyolysis.  He was seen by the inpatient wound team, who initiated treatment to pressure injuries to his right cheek, right chest, right hand, right hip, sacrum, right lateral knee, left medial knee and right lateral ankle.  Once stabilized, he was transferred to a skilled nursing facility until family took him home towards the end of October.  He now lives with his niece and her  who provide 24/7 care.  Nieces  is a retired OR nurse.  Home health has been seeing patient several times per week for wound care.    Pertinent Medical History: Protein calorie malnutrition, falls, personal history of pulmonary embolism, LBBB, elevated PSA, difficulty walking, bilateral hip replacements   Contributing factors: Immobility -requires assist for mobilization, uses FWW.  Activity level:Mostly sedentary, ambulates short distances with assist.  Support surfaces: Currently on a regular mattress.  Incontinence: Indwelling catheter for urine, incontinence of stool.  Nutritional state: He is underweight. Caregiver assistance: Family members take care of him 24/7. Moisture: Occasionally from loose stool       TOBACCO USE: Former smoker    Patient's problem list, allergies, and current medications reviewed and updated in Epic    Interval History:  11/7/2023 Initial clinic visit with BRICE Mcallister, FNP-BC, CWSHOLAN, CFCN.   Patient presents today accompanied by his  niece and her .  Niece provides most of his health information.  Most of the wounds noted during hospitalization have resolved, with the exception of his right hip pressure injury.  He does have a small open wound to his neck which he is states is notable skin cancer.  Patient does not want to see dermatology he is transported into the clinic today in a wheelchair.  Required assist to transfer to [facility].   Per family members, patient spends most of his time either in bed or in his recliner chair.  He often complains of sacral pain.  Sacrum is slightly red but skin is intact.  They have been seeing an inflatable donut under his sacrum when he is sitting.  I advised them to discontinue, recommended Roho mosaic cushion instead.  They feel he is eating well, they have been trying to feed him high-protein meals, niece has recently purchased Ensure supplemental shakes.  He is also taking a multivitamin.    11/15/2023 : Clinic visit with BRICE Mcallister, RENÉ, DONN, CFPRITESH.   Patient presents today with his niece and her .  He is able to answer simple questions, but defers to his family to provide most of his information.  His hip wound has improved, depth is decreased.  Family states he is eating well.  Due to progress of wound, they would like to hold off on VAC for now.   Patient has an appointment with urology tomorrow.  Plan to trial DC of indwelling catheter planned.  If unsuccessful, family is not certain what next step would be-i.e. suprapubic or continue with containment garments.    11/27/23: Clinic visit with Jaimie NARVAEZ, RENÉ, ELIZABETH, CFPRITESH.  Pt denies fevers, chills, nausea, vomiting. Continues with guerin cath. Niece and  at visit today. R hip wound decreased, however UM 9-4:00.      REVIEW OF SYSTEMS:   Unchanged from previous wound clinic assessment on 11/15/23, except as noted in interval history above      PHYSICAL EXAMINATION:   /71   Pulse 74   Temp 35.8 °C  (96.5 °F) (Temporal)   Resp 16   SpO2 99%     Physical Exam  Constitutional:       Appearance: He is ill-appearing.      Comments: Underweight     HENT:      Head:      Comments: Very hard of hearing  Cardiovascular:      Rate and Rhythm: Normal rate.   Pulmonary:      Effort: Pulmonary effort is normal.   Skin:     Comments: Stage IV pressure injury to right hip-wound area and depth have decreased, senescent tissue to wound bed, moderate serosanguineous drainage, no odor.  Periwound with purple discoloration-venous congestion.,    Shallow open wound to neck-reportedly skin cancer-not confirmed, dried serous colored crusts to wound edges    Scarring to chest and right hand   Neurological:      Comments: Relies on family members to provide health information           WOUND ASSESSMENT    Wound 08/27/23 Pressure Injury Hip Right --Right Hip (Active)   Wound Image   11/27/23 1500   Site Assessment Red;Yellow;Granulation tissue 11/27/23 1500   Periwound Assessment Intact;Ecchymosis;Dark edges 11/27/23 1500   Margins Unattached edges 11/27/23 1500   Drainage Amount Moderate 11/27/23 1500   Drainage Description Serosanguineous 11/27/23 1500   Treatments Topical Lidocaine;Cleansed;Provider debridement;Site care 11/27/23 1500   Wound Cleansing Hypochlorus Acid 11/27/23 1500   Periwound Protectant Skin Protectant Wipes to Periwound 11/27/23 1500   Dressing Changed Changed 11/27/23 1500   Dressing Cleansing/Solutions Not Applicable 11/27/23 1500   Dressing Options Collagen Dressing;Hydrofera Blue Ready;Offloading Dressing - Sacral 11/27/23 1500   Dressing Change/Treatment Frequency Every 72 hrs, and As Needed 11/27/23 1500   WOUND NURSE ONLY - Pressure Injury Stage 4 11/27/23 1500   Wound Length (cm) 1 cm 11/27/23 1500   Wound Width (cm) 1.2 cm 11/27/23 1500   Wound Depth (cm) 0.3 cm 11/27/23 1500   Wound Surface Area (cm^2) 1.2 cm^2 11/27/23 1500   Wound Volume (cm^3) 0.36 cm^3 11/27/23 1500   Post-Procedure Length (cm)  1.2 cm 11/27/23 1500   Post-Procedure Width (cm) 1.4 cm 11/27/23 1500   Post-Procedure Depth (cm) 0.3 cm 11/27/23 1500   Post-Procedure Surface Area (cm^2) 1.68 cm^2 11/27/23 1500   Post-Procedure Volume (cm^3) 0.504 cm^3 11/27/23 1500   Wound Healing % 76 11/27/23 1500   Tunneling (cm) 0 cm 11/07/23 1530   Undermining (cm) 0.4 cm 11/27/23 1500   Undermining of Wound, 1st Location From 9 o'clock;To 4 o'clock 11/27/23 1500   Wound Odor None 11/27/23 1500   Exposed Structures None 11/07/23 1530   Number of days: 97       Wound 11/07/23 Full Thickness Wound Throat Anterior --MIdline/Anterior Throat/Neck (Active)   Wound Image    11/27/23 1500   Site Assessment Pink;Red;Scabbed 11/27/23 1500   Periwound Assessment Dry;Intact 11/27/23 1500   Margins Attached edges 11/27/23 1500   Drainage Amount Small 11/27/23 1500   Drainage Description Serosanguineous 11/27/23 1500   Treatments Topical Lidocaine;Cleansed;Provider debridement;Site care 11/27/23 1500   Wound Cleansing Hypochlorus Acid 11/27/23 1500   Periwound Protectant Skin Protectant Wipes to Periwound 11/27/23 1500   Dressing Changed Changed 11/27/23 1500   Dressing Cleansing/Solutions Not Applicable 11/27/23 1500   Dressing Options Adaptic;Silicone Adhesive Foam 11/27/23 1500   Dressing Change/Treatment Frequency Every 72 hrs, and As Needed 11/27/23 1500   Non-staged Wound Description Full thickness 11/27/23 1500   Wound Length (cm) 1.5 cm 11/27/23 1500   Wound Width (cm) 2.5 cm 11/27/23 1500   Wound Depth (cm) 0.1 cm 11/27/23 1500   Wound Surface Area (cm^2) 3.75 cm^2 11/27/23 1500   Wound Volume (cm^3) 0.375 cm^3 11/27/23 1500   Post-Procedure Length (cm) 1.5 cm 11/27/23 1500   Post-Procedure Width (cm) 2.5 cm 11/27/23 1500   Post-Procedure Depth (cm) 0.1 cm 11/27/23 1500   Post-Procedure Surface Area (cm^2) 3.75 cm^2 11/27/23 1500   Post-Procedure Volume (cm^3) 0.375 cm^3 11/27/23 1500   Wound Healing % -39 11/27/23 1500   Tunneling (cm) 0 cm 11/27/23 1500  "  Undermining (cm) 0 cm 23 1500   Wound Odor None 23 1500   Exposed Structures None 23 1500   Number of days: 25       PROCEDURE: R hip   -2% viscous lidocaine applied topically to wound bed for approximately 5 minutes prior to debridement  -Curette used to debride wound bed.  Excisional debridement was performed to remove devitalized tissue until healthy, bleeding tissue was visualized.   Entire surface of wound, 1.68 cm² debrided.  Tissue debrided into the subcutaneous layer.    Removed loose yellow crusts/dried blood from neck wound edges with forceps.  -Bleeding controlled with manual pressure.    -Wound care completed by wound RN, refer to flowsheet  -Patient tolerated the procedure well, without c/o pain or discomfort.       Pertinent Labs and Diagnostics:    Labs:     A1c: No results found for: \"HBA1C\"       IMAGIN2023-x-ray of right hip with pelvis  IMPRESSION:     1.  No radiographic evidence of acute traumatic injury.  2.  Atherosclerosis    VASCULAR STUDIES: N/A    LAST  WOUND CULTURE:  DATE :    Lab Results   Component Value Date/Time    CULTRSULT  2023 11:15 AM     Mixed enteric gato >100,000 cfu/mL  Greater than 3 organisms isolated, culture of doubtful  significance, please recollect.             ASSESSMENT AND PLAN:     1. Pressure injury of right hip, stage 4 (HCC)    2023: Area and depth of wound have decreased since last assessment.  -Excisional debridement of wound in clinic today, medically necessary to promote wound healing.  -Patient to return to clinic weekly for assessment and debridement  -Home health to change dressing 2 times per week in between clinic visits   -continue to hold off initiating VAC as wound progressing  -Offloading strategies discussed with patient's niece and her  last visit.  They both appear to be well versed,  is a retired nurse  -We had recommended family purchase a Roho mosaic cushion     Wound care: Collagen " into undermining to accelerate granulation, hydrofera blue to manage exudate, sacral offloading foam cover dressing, Hypafix tape     2. Open wound of neck, subsequent encounter    11/27/2023: Per family, patient has had this wound for a long time.  Reported skin cancer from pt/family, not confirmed. However pt does not wish to pursue dermatology as his main goal is care of hip and it is taxing to get to and from appts  -removed loose crust/dried blood. Pt tolerated.      Wound care: Adaptic contact layer, foam cover dressing, Hypafix tape     3. Personal history of fall  4. Age-related physical debility  Patient fell at home in August 2023, was down for unknown length of time, sustaining multiple pressure injuries.  He is physically debilitated, relies on family for most of his needs  11/27/2023: no longer able to use FWW. Now using WC to transport to clinic appts  Weak with transfers from WC to bed/gurney. Nursing staff discussed purchasing sling for lawanda lift for appts  -consider palliative care/hospice referral if condition continues to deteriorate  -He is under family supervision 24/7.  They state he does not try to ambulate on his own        5. Unspecified severe protein-calorie malnutrition (HCC)    11/27/2023: Patient is underweight. Reviewed importance of  adequate proteins and calories.  -Family has been providing him with regular meals, with emphasis on protein  -He is receiving supplemental protein shakes daily          PATIENT EDUCATION  -Etiology of pressure injury  -Importance of offloading and frequent repositioning  -Strategies for offloading in bed and when seated discussed and demonstrated  - Importance of adequate nutrition for wound healing  - Advised to go to ER for any increased redness, swelling, drainage or odor, or if patient develops fever, chills, nausea or vomiting.        Please note that this note may have been created using voice recognition software. I have worked with technical  experts from [facility] to optimize the interface.  I have made every reasonable attempt to correct obvious errors, but there may be errors of grammar and possibly content that I did not discover before finalizing the note.    N

## 2023-11-28 NOTE — PATIENT INSTRUCTIONS
-Keep your wound dressing clean, dry, and intact.    -Change your dressing if it becomes soiled, soaked, or falls off.    -Should you experience any significant changes in your wound(s), such as infection (redness, swelling, localized heat, increased pain, fever > 101 F, chills) or have any questions regarding your home care instructions, please contact the wound center at [phone]. If after hours, contact your primary care physician or go to the hospital emergency room.

## 2023-11-28 NOTE — PROGRESS NOTES
2% viscous lidocaine applied topically to wound bed for approximately ~ 5 minutes prior to provider debridement. Entire surface of wound, ~4.95 cm2 debrided. Refer to flow sheet for wound care details.  Patient tolerated the procedure well.  Family with patient, Nephew is a retired RN, very knowledgeable.  Home Health comes for most visits but family is competent with dressing changes as needed.      Hip dressing changed to Hydrofera Blue covered by Sacral Foam and Neck wound changed to adaptic covered by adhesive silicone foam per provider.  New orders sent to [facility].     Suggested that family talk with Home Health nurse regarding a Ally lift and at home to help transfer from bed to chair and to help with transfer at the wound clinic to bed. Family is currently working on wheelchair transport to and a from office because patient is becoming increasingly difficult to get into the car.

## 2023-12-05 NOTE — TELEPHONE ENCOUNTER
Called and left message in regards to Lab Results.    Per Dr Lewis:      Therese Sibley, Med Ass't  [facility]  Ph. [phone]  Fx. [phone]

## 2023-12-06 NOTE — TELEPHONE ENCOUNTER
Called and spoke with emergency contact (Deidre), who has been helping patient with his medical care.    Relayed the message in regards to most recent blood test results. Patient stopped blood thinner already according to his last visit with Dr Lewis on 11/17.  And patient will call BRICE Nunez to schedule a sooner appt.    Verbal confirmation provided.    Therese Sibley, Med Ass't  [facility]  Ph. [phone]  Fx. [phone]

## 2023-12-08 NOTE — PROGRESS NOTES
Provider Encounter- Pressure Injury        HISTORY OF PRESENT ILLNESS  Wound History:    START OF CARE IN CLINIC: 11/7/2023    REFERRING PROVIDER: Edwin De Leon     WOUND- Pressure Injury.    LOCATION AND STAGE: Right hip, stage IV   HISTORY: Patient was found down at home by family on 8/25/2023, it was unknown how long he had been down, possibly several days.  He was brought into the ED and was admitted.  He was found to be confused, and had several areas of skin breakdown to the right side of his body and to his chest.  He is found to have traumatic rhabdomyolysis.  He was seen by the inpatient wound team, who initiated treatment to pressure injuries to his right cheek, right chest, right hand, right hip, sacrum, right lateral knee, left medial knee and right lateral ankle.  Once stabilized, he was transferred to a skilled nursing facility until family took him home towards the end of October.  He now lives with his niece and her  who provide 24/7 care.  Nieces  is a retired OR nurse.  Home health has been seeing patient several times per week for wound care.    Pertinent Medical History: Protein calorie malnutrition, falls, personal history of pulmonary embolism, LBBB, elevated PSA, difficulty walking, bilateral hip replacements   Contributing factors: Immobility -requires assist for mobilization, uses FWW.  Activity level:Mostly sedentary, ambulates short distances with assist.  Support surfaces: Currently on a regular mattress.  Incontinence: Indwelling catheter for urine, incontinence of stool.  Nutritional state: He is underweight. Caregiver assistance: Family members take care of him 24/7. Moisture: Occasionally from loose stool       TOBACCO USE: Former smoker    Patient's problem list, allergies, and current medications reviewed and updated in Epic    Interval History:  11/7/2023 Initial clinic visit with BRICE Mcallister, FNP-BC, CWSHOLAN, CFCN.   Patient presents today accompanied by his  niece and her .  Niece provides most of his health information.  Most of the wounds noted during hospitalization have resolved, with the exception of his right hip pressure injury.  He does have a small open wound to his neck which he is states is notable skin cancer.  Patient does not want to see dermatology he is transported into the clinic today in a wheelchair.  Required assist to transfer to [facility].   Per family members, patient spends most of his time either in bed or in his recliner chair.  He often complains of sacral pain.  Sacrum is slightly red but skin is intact.  They have been seeing an inflatable donut under his sacrum when he is sitting.  I advised them to discontinue, recommended Roho mosaic cushion instead.  They feel he is eating well, they have been trying to feed him high-protein meals, niece has recently purchased Ensure supplemental shakes.  He is also taking a multivitamin.    11/15/2023 : Clinic visit with BRICE Mcallister, RENÉ, JESSA, SINDHU.   Patient presents today with his niece and her .  He is able to answer simple questions, but defers to his family to provide most of his information.  His hip wound has improved, depth is decreased.  Family states he is eating well.  Due to progress of wound, they would like to hold off on VAC for now.   Patient has an appointment with urology tomorrow.  Plan to trial DC of indwelling catheter planned.  If unsuccessful, family is not certain what next step would be-i.e. suprapubic or continue with containment garments.    12/8/2023 : Clinic visit with BRICE Mcallister, RENÉ, JESSA, SINDHU.   Patient is here again today with his niece and her .  They state that patient is becoming increasingly weak, unable to stand without assist, much more difficult to transport.  Patient is complaining of severe pain in his knees today.  Family states has been giving him Tylenol routinely but this has not been effective.  They plan to  contact his PCP to request stronger pain medication.  His wound is actually progressing quite well at this time.  Home health has been seeing him for wound care.    Time spent today discussing patient's overall decline.  Niece and her  feel it is becoming more more unsafe for patient and for them to transport him to and from appointments.  I suggested referrals to either hospice or palliative.  Described services of both.  Patient is alert and oriented, and was able to participate in today's discussion and decision making.  They request referral to palliative.  Referral placed.  Also per agreement, patient will be discharged from [facility].  He will continue to have home health for wound care.  They understand that they can have patient return to the clinic if his wound deteriorates, and I informed them that they could call me at any time if they have concerns about his wound.      REVIEW OF SYSTEMS:   Review of Systems   Unable to perform ROS: Dementia       PHYSICAL EXAMINATION:   /53   Pulse 80   Temp 36.7 °C (98 °F) (Temporal)   Resp 18   SpO2 98%     Physical Exam  Constitutional:       Appearance: He is ill-appearing.      Comments: Underweight     HENT:      Head:      Comments: Very hard of hearing  Cardiovascular:      Rate and Rhythm: Normal rate.   Pulmonary:      Effort: Pulmonary effort is normal.   Skin:     Comments: Stage IV pressure injury to right hip-wound area and depth continue to decrease, slough to wound bed, edges rolled, moderate serosanguineous drainage, no odor.  Periwound with purple/red scar tissue    Shallow open wound to neck-reportedly skin cancer, no debridement    Scarring to chest and right hand   Neurological:      Comments: Relies on family members to provide health information           WOUND ASSESSMENT    Wound 08/27/23 Pressure Injury Hip Right --Right Hip (Active)   Wound Image    12/08/23 0854   Site Assessment Pink;Red 12/08/23 0854   Periwound Assessment  Ecchymosis;Dark edges 12/08/23 0854   Margins Unattached edges 12/08/23 0854   Drainage Amount Moderate 12/08/23 0854   Drainage Description Serosanguineous 12/08/23 0854   Treatments Cleansed;Topical Lidocaine;Provider debridement;Site care 12/08/23 0854   Wound Cleansing Normal Saline Irrigation 12/08/23 0854   Periwound Protectant Skin Protectant Wipes to Periwound 12/08/23 0854   Dressing Changed Changed 11/27/23 1500   Dressing Cleansing/Solutions Normal Saline 12/08/23 0854   Dressing Options Collagen Dressing;Hydrofera Blue Ready;Offloading Dressing - Sacral 12/08/23 0854   Dressing Change/Treatment Frequency Every 72 hrs, and As Needed 12/08/23 0854   WOUND NURSE ONLY - Pressure Injury Stage 4 12/08/23 0854   Wound Length (cm) 0.6 cm 12/08/23 0854   Wound Width (cm) 0.4 cm 12/08/23 0854   Wound Depth (cm) 0.3 cm 12/08/23 0854   Wound Surface Area (cm^2) 0.24 cm^2 12/08/23 0854   Wound Volume (cm^3) 0.072 cm^3 12/08/23 0854   Post-Procedure Length (cm) 0.6 cm 12/08/23 0854   Post-Procedure Width (cm) 0.5 cm 12/08/23 0854   Post-Procedure Depth (cm) 0.3 cm 12/08/23 0854   Post-Procedure Surface Area (cm^2) 0.3 cm^2 12/08/23 0854   Post-Procedure Volume (cm^3) 0.09 cm^3 12/08/23 0854   Wound Healing % 95 12/08/23 0854   Tunneling (cm) 0 cm 11/07/23 1530   Undermining (cm) 0.3 cm 12/08/23 0854   Undermining of Wound, 1st Location From 1 o'clock;To 3 o'clock 12/08/23 0854   Wound Odor None 12/08/23 0854   Exposed Structures None 12/08/23 0854   Number of days: 103       Wound 11/07/23 Full Thickness Wound Throat Anterior --MIdline/Anterior Throat/Neck (Active)   Wound Image   12/08/23 0854   Site Assessment Pink;Red 12/08/23 0854   Periwound Assessment Dry 12/08/23 0854   Margins Attached edges 12/08/23 0854   Drainage Amount Small 12/08/23 0854   Drainage Description Serosanguineous 12/08/23 0854   Treatments Cleansed;Topical Lidocaine;Site care 12/08/23 0854   Wound Cleansing Normal Saline Irrigation 12/08/23  "54   Periwound Protectant Skin Protectant Wipes to Periwound 23   Dressing Changed Changed 23 1500   Dressing Cleansing/Solutions Not Applicable 23   Dressing Options Adaptic;Silicone Adhesive Foam 23   Dressing Change/Treatment Frequency Every 72 hrs, and As Needed 23   Non-staged Wound Description Full thickness 23   Wound Length (cm) 0.6 cm 23   Wound Width (cm) 0.9 cm 23 08   Wound Depth (cm) 0.1 cm 23   Wound Surface Area (cm^2) 0.54 cm^2 23   Wound Volume (cm^3) 0.054 cm^3 23   Post-Procedure Length (cm) 1.5 cm 23 1500   Post-Procedure Width (cm) 2.5 cm 23 1500   Post-Procedure Depth (cm) 0.1 cm 23 1500   Post-Procedure Surface Area (cm^2) 3.75 cm^2 23 1500   Post-Procedure Volume (cm^3) 0.375 cm^3 23 1500   Wound Healing % 80 23   Tunneling (cm) 0 cm 23   Undermining (cm) 0 cm 23   Wound Odor None 23   Exposed Structures None 23   Number of days: 31       PROCEDURE:   -2% viscous lidocaine applied topically to wound bed for approximately 5 minutes prior to debridement  -Curette used to debride wound bed.  Excisional debridement was performed to remove devitalized tissue until healthy, bleeding tissue was visualized.   Entire surface of wound, approximately 2.0 cm² debrided.  Tissue debrided into the subcutaneous layer.    -Bleeding controlled with manual pressure.    -Wound care completed by wound RN, refer to flowsheet  -Patient tolerated the procedure well, without c/o pain or discomfort.       Pertinent Labs and Diagnostics:    Labs:     A1c: No results found for: \"HBA1C\"       IMAGIN2023-x-ray of right hip with pelvis  IMPRESSION:     1.  No radiographic evidence of acute traumatic injury.  2.  Atherosclerosis    VASCULAR STUDIES: N/A    LAST  WOUND CULTURE:  DATE :    Lab Results   Component Value " Date/Time    CULTRSULT  11/13/2023 11:15 AM     Mixed enteric gato >100,000 cfu/mL  Greater than 3 organisms isolated, culture of doubtful  significance, please recollect.             ASSESSMENT AND PLAN:     1. Pressure injury of right hip, stage 4 (HCC)    12/8/2023: Patient's wound continues to progress.  Area and depth steadily decreasing.  -Excisional debridement of wound in clinic today, medically necessary to promote wound healing.  -Patient to return to clinic weekly for assessment and debridement  -Home health to change dressing 2 times per week in between clinic visits   -Offloading strategies discussed with patient's niece and her  last visit.  They both appear to be well versed,  is a retired nurse    Wound care: Collagen into undermining to accelerate granulation, silver Hydrofiber to manage exudate, foam cover dressing, Hypafix tape     2. Open wound of neck, subsequent encounter    12/8/2023: Known skin cancer.  Patient does not want treatment  -Dressing applied, no debridement  -Assess each clinic visit    Wound care: Adaptic contact layer, Silver Hydrofiber to manage exudate and bioburden, foam cover dressing, Hypafix tape     3. Personal history of fall  4. Age-related physical debility    12/8/2023: Patient is becoming increasingly frail, having more difficulty with transfers in and out of bed, and with ambulating.  Relying more more on family for assist.  With him 24/7.  -Patient's niece and her  expressed concerns for his safety and for theirs.  Transferring and transporting patient to and from appointments becoming increasingly more hazardous.  -Referral to palliative care  -Discharge patient from [facility].  They understand that if his wound deteriorates, they can obtain a new referral and return to clinic.  I also offered that they could call me at the clinic for any concerns or questions.        5. Unspecified severe protein-calorie malnutrition (HCC)    12/8/2023: Patient is  severely underweight.  Family states he is eating quite well, and drinking adequate fluids.  They are preparing meals regularly assisting him with eating.  -Family has been providing him with regular meals, with emphasis on protein  -He is receiving supplemental protein shakes daily          PATIENT EDUCATION  -Etiology of pressure injury  -Importance of offloading and frequent repositioning  -Strategies for offloading in bed and when seated discussed and demonstrated  - Importance of adequate nutrition for wound healing  - Advised to go to ER for any increased redness, swelling, drainage or odor, or if patient develops fever, chills, nausea or vomiting.    My total time spent caring for the patient on the day of the encounter was 20 minutes.   This does not include time spent on separately billable procedures/tests.     Please note that this note may have been created using voice recognition software. I have worked with technical experts from [employer] to optimize the interface.  I have made every reasonable attempt to correct obvious errors, but there may be errors of grammar and possibly content that I did not discover before finalizing the note.    N

## 2023-12-08 NOTE — PROGRESS NOTES
Home health orders and notice of discharge from clinic routed to [facility] via [employer].

## 2023-12-12 NOTE — PROGRESS NOTES
CHSHITAL Chen contacted pt's niece per BROOKLYN Schofield's request. CHW introduced self and role in department. Pt's niece had mentioned that pt was not currently in need of any specific resources or assistance. Pt's niece had mentioned that a referral was placed to palliative care on 12/8/2023 during visit to wound clinic (still waiting for call from palliative care to initiate in home assessment). Pt's niece had also mentioned that PCP placed referral to [facility] for lawanda lift. Pt's niece stated that she feels like most needs are being met and that others are in the process of being met; CHW assistance is not needed at this time.CHW provided contact information and encouraged pt's niece to contact if anything changes.     CHW will contact pt's niece on 12/15/2023 to check progress of referrals/ get updates. CHW will reassess needs with niece at that time.

## 2023-12-12 NOTE — PROGRESS NOTES
Virtual Visit: Established Patient   This visit was conducted via Zoom using secure and encrypted videoconferencing technology.   The patient was in their home in the state of [place].    The patient's identity was confirmed and verbal consent was obtained for this virtual visit.     Subjective:   CC:   Chief Complaint   Patient presents with    Anemia       Moshe Guerrero is a 88 y.o. male presenting for evaluation and management of:    Problem   Microcytic Hypochromic Anemia    Continued issue.  Most recent CBC indicated decrease in red blood cells.  Patient states that over the last Few weeks he has started to feel a lot better he is no longer having any dizziness.  He is not having black stools but did have some darker stools related to taking Pepto-Bismol.  Occult stool was negative.  Patient states that other than muscle and joint pain since taking medication to clear up his urinary tract infection he is feeling much better.     Difficulty in Walking, Not Elsewhere Classified    Chronic in nature.  Significantly worsening.  States significantly diffculty with walking and transferring. Due to the contracture in his knee.  Patient states that some days are better than others but he is having increasing difficulty with weakness and being able to transfer.  As such patient family is requesting order for Ally lift which does seem reasonable at this time to allow them to get the patient to important appointments.     Obstructive and Reflux Uropathy, Unspecified    Chronic in nature patient is following with Dr. Riojas in urology regarding this issue, currently he is waiting to be maintaining a Mejía catheter, he will be going into urology [place] once per month to change this catheter.  Urology will be helping this monitor for UTI.     Chronic Pain of Right Knee    Chronic in nature. Contracted, pain was much more severe. States difficulty with ambulation and getting in and out of the car was very difficult. States  they are working on getting access to healthcare so that he can get more accessible transportation. Reports his right knee pain has been much more severe. States that this pain has made it difficulty to get up of of bed. Using tylenol and CBD oil.            ROS   Denies dizziness, chest pain, palpitations, any obvious source of bleeding    Current medicines (including changes today)  Current Outpatient Medications   Medication Sig Dispense Refill    NON SPECIFIED Please provider 1 lawanda lift to patient. Small for house. 1 Each 0    traMADol (ULTRAM) 50 MG Tab Take 1 Tablet by mouth every 12 hours for 30 days. 60 Tablet 0    finasteride (PROSCAR) 5 MG Tab Take 5 mg by mouth at bedtime.      tamsulosin (FLOMAX) 0.4 MG capsule TAKE 1 CAPSULE BY MOUTH EVERY DAY AT BEDTIME      simvastatin (ZOCOR) 80 MG tablet TAKE 1 TABLET BY MOUTH AT BEDTIME 100 Tablet 0    Sodium Hypochlorite (DAKINS 0.125%, 1/4 STRENGTH,) 0.125 % Solution Apply 10 mL topically 2 times a day.      omeprazole (PRILOSEC) 20 MG delayed-release capsule Take 20 mg by mouth every day.      Multiple Vitamins-Minerals (CENTRUM ADULTS PO) Take 1 Tablet by mouth every day.      Cholecalciferol (D3-50 PO) Take 1 Tablet by mouth every day.       No current facility-administered medications for this visit.       Patient Active Problem List    Diagnosis Date Noted    Prostate cancer (HCC) 11/17/2023    Orthostatic hypotension 11/17/2023    Chronic HFrEF (heart failure with reduced ejection fraction) ([facility]) 11/17/2023    Aortic atherosclerosis (HCC) 11/17/2023    Microcytic hypochromic anemia 11/17/2023    Gastro-esophageal reflux disease without esophagitis 08/31/2023    Unspecified severe protein-calorie malnutrition (HCC) 08/31/2023    Difficulty in walking, not elsewhere classified 08/30/2023    Obstructive and reflux uropathy, unspecified 08/30/2023    Muscle weakness (generalized) 08/30/2023    Personal history of pulmonary embolism 08/30/2023    Pressure  "ulcer of right hip, stage 4 (HCC) 08/30/2023    Unspecified macular degeneration 08/30/2023    Pulmonary emboli (HCC) 08/27/2023    LBBB (left bundle branch block) 08/26/2023    Swelling of right hand 08/26/2023    Urinary retention 08/26/2023    Traumatic rhabdomyolysis (HCC) 08/25/2023    Risk for falls 04/11/2023    Primary osteoarthritis of one hip, right 11/01/2022    Osteoarthritis of hip 09/26/2022    Chronic pain of right knee 09/01/2022    Effusion, right knee 09/01/2022    Hyperlipidemia 01/08/2019    Elevated PSA measurement 01/08/2019        Objective:   Resp 16   Ht 1.854 m (6' 1\")   Wt 63 kg (139 lb) Comment: patient stated  BMI 18.34 kg/m²     Physical Exam:  Constitutional: Alert, no distress, well-groomed.  Skin: No rashes in visible areas.  Eye: Round. Conjunctiva clear, lids normal. No icterus.   ENMT: Lips pink without lesions, good dentition, moist mucous membranes. Phonation normal.  Neck: No masses, no thyromegaly. Moves freely without pain.  Respiratory: Unlabored respiratory effort, no cough or audible wheeze  Psych: Alert and oriented x3, normal affect and mood.     Assessment and Plan:   The following treatment plan was discussed:     Problem List Items Addressed This Visit       Chronic pain of right knee     -tramadol 50 mg by mouth up to twice daily.  Extensive discussion regarding risk, benefits of the medication. Recommended to closely monitor for constipation.         Relevant Medications    traMADol (ULTRAM) 50 MG Tab    Other Relevant Orders    Consent for Opiate Prescription (Completed)    Difficulty in walking, not elsewhere classified     -prescription for Ally Lift         Relevant Medications    NON SPECIFIED    Other Relevant Orders    Controlled Substance Treatment Agreement    Microcytic hypochromic anemia     -Repeat CBC  -Patient counseled extensively regarding reasons to go to the emergency room including change in level of consciousness, increasing dizziness, bloody " stool, severe abdominal pain, chest pain.         Relevant Orders    CBC WITHOUT DIFFERENTIAL    Obstructive and reflux uropathy, unspecified     -Continue Mejía catheter and follow-up with urology [place].          Other Visit Diagnoses       Dysuria        Relevant Orders    URINALYSIS              Follow-up: Return in about 1 month (around 1/12/2024), or if symptoms worsen or fail to improve, for Controlled substance refill.

## 2023-12-12 NOTE — ASSESSMENT & PLAN NOTE
-Repeat CBC  -Patient counseled extensively regarding reasons to go to the emergency room including change in level of consciousness, increasing dizziness, bloody stool, severe abdominal pain, chest pain.

## 2023-12-12 NOTE — ASSESSMENT & PLAN NOTE
-tramadol 50 mg by mouth up to twice daily.  Extensive discussion regarding risk, benefits of the medication. Recommended to closely monitor for constipation.

## 2023-12-22 NOTE — PROGRESS NOTES
In-Home Palliative Medicine Evaluation      Moshe Guerrero  88 y.o.  Male  MRN [id number]  PCP COLEEN Keller11/2023  Location: Moshe's private home    Reason for palliative medicine consultation and/or visit: Goals of care discussion    Assessment and Plan:    Summary:  Met with Moshe and his niece Lina and nephew-in-law Kalyan.  Moshe is very hard of hearing and had difficulty staying awake during this encounter.  Lina and Kalyan gave most of the history.  Sadly, Moshe suffers from debilitating arthritis that contributes to his dependency on Lina and Kalyan to assist in most ADLs. He has moderate to severe pain and contractures in many joints including his right hsoulder and hand, and both knees and hips.  At this time he lives with Lina and Kalyan after a fall where he was on the floor for 3 days leading to hospitalization, and SNF admission where he underwent therapies for ambulation and wound care.  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  He is also urinary catheter dependent due to urinary retention secondary to grossly enlarged prostate.  He has been diagnosed with prostate cancer, though did not undergo further diagnostics recommended to determine where there may be metastasis.  He also is with HFrEF (last EF 30%), and is symptomatic with SOB on minimal exertion.  Hospice referral placed. POLST already complete; DNR, Selective Treatment.  Recommend Aleve 220mg BID, cranberry tablets daily, and discontinue simvastatin.  Will follow-up if no hospice admission.      Primary diagnosis: Prostate Cancer with assumed metastasis to pelvis. 11/2023, refused to do entire work-up    Prognosis: As discussed with patient and family, PPS 40% functional scoring indicates that if the disease process were to run it's natural course, probable survival is less than less than 6 months.    Physical aspects of care:  Fall  Risk, falls if he ambulates alone  Dependent in all ADLs except eating.  Incontinent bowel and bladder.  Mejía Cath in place.  Wound - Right Hip healing stage 4 wound.  Pain - moderate to severe pain in both knees, right shoulder, and right hand (now having to learn to do things with left hand due to pain), hands have arthritic knots, and knees need pillow between them to avoid touching due to hip contractures. - taking 3000 mg tylenol daily  Appetite - good  Breathing - denies sob, though family indicates he seems short of breath on minimal exertion    New orders/recommendations:  Continue Tylenol 1000 mg TID  Naproxen 220 mg BID  Cranberry tablets daily as directed on bottle  Hospice Referral    Psycho-Social aspects of care:  Lived alone until August when he fell.  His wife passed 14 years ago. Lives with niece Lina, and er  Kalyan.  Lina was bringing him meals at his home until he fell.  Once he was discharged from [facility] in October, he moved in with Lina.Lina and Kalyan are retired from the medical field, Kalyan being an operating room nurse.  They have made many accommodations to support Moshe.    Goals of care:  Moshe has verbalized that he wants to avoid hospitalization.  He has asked his niece to help him make decisions about care, and he has stated that he doesn't want to have CPR, or have extensive treatment or diagnostics.  Considering all of this, hospice was introduced as an option.  Lina and Kalyan are in agreement for referral, and Moshe has agreed to this consult.     Advance care planning:  Lina Howell is DPOA for healthcare decisions. POLST is complete; DNR, Selective Treatment.    The patient and/or legal decision maker has provided voluntary consent to discuss advance care planning. We discussed current condition, progression of disease process, prognosis, DNR, hospitalization, hospice. Total time spent in ACP discussion 70 minutes, which is separate from the time spent completing the  evaluation and management visit.     Pertinent Physical Exam Findings:  Vital Signs: HR 68, RR 14, O2 sat 905 RA  Constitutional: Fragile appearance, gaunt, cachectic   HEENT: Very [place]  Pulm/Chest: diminished all bases  CVS: mild BLEE  Abdomen: normoactive bowel sounds  MSK: weakness in all extremities  Neuro: lethargy present, oriented to person, place, time and situation  Skin: fragile, wound on right hip covered  Psych: cooperative, no signs of distress    Other Pertinent Medical History OR Surgery Not Listed Above:   Microcytic hypochromic anemia Personal history of pulmonary embolism     Current Medications:    Current Outpatient Medications:     traMADol (ULTRAM) 50 MG Tab, Take 1 Tablet by mouth every 12 hours for 30 days., Disp: 60 Tablet, Rfl: 0    NON SPECIFIED, Please provider 1 lawanda lift to patient. Small for house., Disp: 1 Each, Rfl: 0    finasteride (PROSCAR) 5 MG Tab, Take 5 mg by mouth at bedtime., Disp: , Rfl:     tamsulosin (FLOMAX) 0.4 MG capsule, TAKE 1 CAPSULE BY MOUTH EVERY DAY AT BEDTIME, Disp: , Rfl:     simvastatin (ZOCOR) 80 MG tablet, TAKE 1 TABLET BY MOUTH AT BEDTIME, Disp: 100 Tablet, Rfl: 0    Sodium Hypochlorite (DAKINS 0.125%, 1/4 STRENGTH,) 0.125 % Solution, Apply 10 mL topically 2 times a day., Disp: , Rfl:     omeprazole (PRILOSEC) 20 MG delayed-release capsule, Take 20 mg by mouth every day., Disp: , Rfl:     Multiple Vitamins-Minerals (CENTRUM ADULTS PO), Take 1 Tablet by mouth every day., Disp: , Rfl:     Cholecalciferol (D3-50 PO), Take 1 Tablet by mouth every day., Disp: , Rfl:     Medication Allergies:  Patient has no known allergies.    Thank you for allowing me the opportunity to participate in the care of Moshe Guerrero    I spent a total of 90 minutes reviewing medical records, direct face-to-face time with the patient and/or family, documentation and coordination of care. This is separate from the time spent on advance care planning, which is documented  above.    Una NARVAEZ  [facility]Home [facility]   P - [phone]   C - [phone]

## 2024-01-01 ENCOUNTER — HOME CARE VISIT (OUTPATIENT)
Dept: HOSPICE | Facility: HOSPICE | Age: 89
End: 2024-01-01
Payer: MEDICARE

## 2024-01-01 ENCOUNTER — PHARMACY VISIT (OUTPATIENT)
Dept: PHARMACY | Facility: MEDICAL CENTER | Age: 89
End: 2024-01-01
Payer: COMMERCIAL

## 2024-01-01 ENCOUNTER — PHARMACY VISIT (OUTPATIENT)
Dept: PHARMACY | Facility: MEDICAL CENTER | Age: 89
End: 2024-01-01

## 2024-01-01 VITALS — DIASTOLIC BLOOD PRESSURE: 62 MMHG | SYSTOLIC BLOOD PRESSURE: 132 MMHG | RESPIRATION RATE: 20 BRPM | HEART RATE: 90 BPM

## 2024-01-01 VITALS — RESPIRATION RATE: 16 BRPM | HEART RATE: 60 BPM

## 2024-01-01 DIAGNOSIS — I50.20 HFREF (HEART FAILURE WITH REDUCED EJECTION FRACTION) (HCC): Primary | ICD-10-CM

## 2024-01-01 PROCEDURE — S9126 HOSPICE CARE, IN THE HOME, P: HCPCS

## 2024-01-01 PROCEDURE — RXMED WILLOW AMBULATORY MEDICATION CHARGE: Performed by: REHABILITATION PRACTITIONER

## 2024-01-01 PROCEDURE — G0299 HHS/HOSPICE OF RN EA 15 MIN: HCPCS

## 2024-01-01 PROCEDURE — 665036 HSPC NOTICE OF ELECTION NOE

## 2024-01-01 PROCEDURE — G0156 HHCP-SVS OF AIDE,EA 15 MIN: HCPCS

## 2024-01-01 RX ORDER — OMEPRAZOLE 20 MG/1
20 CAPSULE, DELAYED RELEASE ORAL DAILY
Qty: 30 CAPSULE | Refills: 11 | Status: SHIPPED | OUTPATIENT
Start: 2024-01-01 | End: 2025-01-04

## 2024-01-01 RX ORDER — NAPROXEN SODIUM 220 MG
220 TABLET ORAL 2 TIMES DAILY WITH MEALS
Qty: 60 TABLET | Refills: 11 | Status: SHIPPED | OUTPATIENT
Start: 2024-01-01 | End: 2025-01-04

## 2024-01-01 RX ORDER — MORPHINE SULFATE 100 MG/5ML
10-20 SOLUTION ORAL
Qty: 240 ML | Refills: 0 | Status: SHIPPED | OUTPATIENT
Start: 2024-01-01 | End: 2025-01-04

## 2024-01-01 RX ORDER — ONDANSETRON 4 MG/1
4 TABLET, ORALLY DISINTEGRATING ORAL EVERY 6 HOURS PRN
Qty: 15 TABLET | Refills: 10 | Status: SHIPPED | OUTPATIENT
Start: 2024-01-01 | End: 2025-01-04

## 2024-01-01 RX ORDER — SENNA AND DOCUSATE SODIUM 50; 8.6 MG/1; MG/1
2 TABLET, FILM COATED ORAL 2 TIMES DAILY PRN
Qty: 28 TABLET | Refills: 10 | Status: SHIPPED | OUTPATIENT
Start: 2024-01-01 | End: 2025-01-04

## 2024-01-01 RX ORDER — LORAZEPAM 2 MG/ML
1-2 CONCENTRATE ORAL
Qty: 360 ML | Refills: 0 | Status: SHIPPED | OUTPATIENT
Start: 2024-01-01 | End: 2025-01-04

## 2024-01-01 RX ORDER — ACETAMINOPHEN 650 MG/1
650 SUPPOSITORY RECTAL EVERY 6 HOURS PRN
Qty: 5 SUPPOSITORY | Refills: 10 | Status: SHIPPED | OUTPATIENT
Start: 2024-01-01 | End: 2025-01-04

## 2024-01-01 RX ORDER — TRAZODONE HYDROCHLORIDE 50 MG/1
50 TABLET ORAL NIGHTLY PRN
Qty: 60 TABLET | Refills: 11 | Status: SHIPPED | OUTPATIENT
Start: 2024-01-01 | End: 2025-01-04

## 2024-01-01 RX ORDER — BISACODYL 10 MG
10 SUPPOSITORY, RECTAL RECTAL PRN
Qty: 5 SUPPOSITORY | Refills: 10 | Status: SHIPPED | OUTPATIENT
Start: 2024-01-01 | End: 2025-01-04

## 2024-01-01 RX ORDER — ACETAMINOPHEN 500 MG
1000 TABLET ORAL EVERY 6 HOURS PRN
Qty: 30 TABLET | Refills: 10 | Status: SHIPPED | OUTPATIENT
Start: 2024-01-01 | End: 2025-01-04

## 2024-01-01 SDOH — ECONOMIC STABILITY: GENERAL

## 2024-01-01 ASSESSMENT — ENCOUNTER SYMPTOMS
PAIN LOCATION - PAIN FREQUENCY: WITH ACTIVITY
MUSCLE WEAKNESS: 1
FORGETFULNESS: 1
UNABLE TO COMMUNICATE PAIN: 1
COUGH CHARACTERISTICS: NON-PRODUCTIVE
STOOL FREQUENCY: LESS THAN DAILY
FATIGUE: 1
PAIN: 1
LAST BOWEL MOVEMENT: 66844
STOOL FREQUENCY: DAILY
FATIGUES EASILY: 1
PERSON REPORTING PAIN: PATIENT
SLEEP QUALITY: FAIR
PAIN LOCATION - EXACERBATING FACTORS: WALKING
BOWEL PATTERN NORMAL: 1
PAIN LOCATION - RELIEVING FACTORS: REST
MUSCLE WEAKNESS: 1
COUGH CHARACTERISTICS: DRY
COUGH: 1
BOWEL PATTERN NORMAL: 1
LIMITED RANGE OF MOTION: 1
LOWER EXTREMITY EDEMA: 1
DYSPNEA ON EXERTION: 1
PAIN LOCATION: RIGHT KNEE
FATIGUE: 1

## 2024-01-01 ASSESSMENT — PAIN SCALES - PAIN ASSESSMENT IN ADVANCED DEMENTIA (PAINAD)
TOTALSCORE: 2
BODYLANGUAGE: 0 - RELAXED.
FACIALEXPRESSION: 1 - SAD. FRIGHTENED. FROWN.
NEGVOCALIZATION: 0 - NONE.
CONSOLABILITY: 1 - DISTRACTED OR REASSURED BY VOICE OR TOUCH.

## 2024-01-01 ASSESSMENT — ACTIVITIES OF DAILY LIVING (ADL)
EATING_REQUIRES_ASSISTANCE: 1
CURRENT_FUNCTION: STAND BY ASSIST
CONTINENCE_REQUIRES_ASSISTANCE: 1
CURRENT_FUNCTION: ONE PERSON
DRESSING_REQUIRES_ASSISTANCE: 1
AMBULATION ASSISTANCE: STAND BY ASSIST
AMBULATION ASSISTANCE: ONE PERSON
PHYSICAL_TRANSFER_REQUIRES_ASSISTANCE: 1
BATHING_REQUIRES_ASSISTANCE: 1
MONEY MANAGEMENT (EXPENSES/BILLS): TOTALLY DEPENDENT

## 2024-01-01 ASSESSMENT — SOCIAL DETERMINANTS OF HEALTH (SDOH): ACTIVE STRESSOR - NO STRESS FACTORS: 1

## 2024-01-01 ASSESSMENT — PATIENT HEALTH QUESTIONNAIRE - PHQ9
5. POOR APPETITE OR OVEREATING: 0 - NOT AT ALL
CLINICAL INTERPRETATION OF PHQ2 SCORE: 3
SUM OF ALL RESPONSES TO PHQ QUESTIONS 1-9: 12

## 2024-01-05 NOTE — PROGRESS NOTES
Patient discussed with August Harman RN. Patient admitting to [facility] hospice today with diagnosis HFrEF, (30%), prostate cancer with probable pelvic mets, AFTT.   Comfort medications ordered for delivery today.

## 2024-06-07 NOTE — PROCEDURE: LIQUID NITROGEN
- Likely multifactorial with multiorgan dysfunction.  - Goals are now for comfort directed care.  - Monitor for constipation, urinary retention, pain, hunger, thirst, etc.  Promote sleep wake cycle and reorientation as indicated.
Duration Of Freeze Thaw-Cycle (Seconds): 0
Render Note In Bullet Format When Appropriate: No
Detail Level: Detailed
Post-Care Instructions: I reviewed with the patient in detail post-care instructions. Patient is to wear sunprotection, and avoid picking at any of the treated lesions. Pt may apply Vaseline to crusted or scabbing areas.
Consent: The patient's consent was obtained including but not limited to risks of crusting, scabbing, blistering, scarring, darker or lighter pigmentary change, recurrence, incomplete removal and infection.

## 2024-06-18 NOTE — OR NURSING
Patient instructed to continue prescribed medications through the day before surgery, verbal and written instructions given to take the following medications the day of surgery per anesthesia protocol: TYLENOL, PRILOSEC, ZOCOR, family and pt repeated which meds to take DOS.        Verbal and written, and pre-admit video website instructions provided via email. Family states she received the email, and will be taking care of the pt's surgery preparations and will be staying with the pt post surgery.    FALL RISK ordered and protocol initiated.     Pt saw PCP for pre-surgery consultation, and is considered low cardiopulmonary risk , see epic note 10/05/22 Dr. Cagle.   Sunscreen Recommendations: 50+ sunscreen with Zinc/Titanium. Detail Level: Simple Skin Checks Recommendations: Every 6 months. Detail Level: Zone Sunscreen Recommendations: 50+ sunscreen with zinc and titanium dioxide. Detail Level: Detailed

## (undated) DEVICE — GLOVE BIOGEL PI ORTHO SZ 7.5 PF LF (40PR/BX)

## (undated) DEVICE — SUTURE 3-0 MONOCRYL PLUS PS-1 - 27 INCH (36/BX)

## (undated) DEVICE — DRAPE C-ARM LARGE 41IN X 74 IN - (10/BX 2BX/CA)

## (undated) DEVICE — ELECTRODE DUAL RETURN W/ CORD - (50/PK)

## (undated) DEVICE — GLOVE BIOGEL PI INDICATOR SZ 8.0 SURGICAL PF LF -(50/BX 4BX/CA)

## (undated) DEVICE — SUTURE 2-0 MONOCRYL PLUS UNDYED CT-1 1 X 36 (36EA/BX)"

## (undated) DEVICE — SYS BN CMNT HI VAC KT MXR BWL - (MIX-E-VAC II)  (10EA/CA)

## (undated) DEVICE — DRESSING STERILE BURN ACTICOAT FLEX 7 (50EA/CA)

## (undated) DEVICE — TIP INTPLS HFLO ML ORFC BTRY - (12/CS)  FOR SURGILAV

## (undated) DEVICE — GLOVE BIOGEL PI INDICATOR SZ 7.5 SURGICAL PF LF -(50/BX 4BX/CA)

## (undated) DEVICE — CANISTER SUCTION 3000ML MECHANICAL FILTER AUTO SHUTOFF MEDI-VAC NONSTERILE LF DISP  (40EA/CA)

## (undated) DEVICE — TUBING CLEARLINK DUO-VENT - C-FLO (48EA/CA)

## (undated) DEVICE — SET EXTENSION WITH 2 PORTS (48EA/CA) ***PART #2C8610 IS A SUBSTITUTE*****

## (undated) DEVICE — SUTURE 5 ETHIBOND V-37 (12PK/BX)

## (undated) DEVICE — DRAPE SURGICAL U 77X120 - (10/CA)

## (undated) DEVICE — GOWN WARMING STANDARD FLEX - (30/CA)

## (undated) DEVICE — SODIUM CHL IRRIGATION 0.9% 1000ML (12EA/CA)

## (undated) DEVICE — SUCTION INSTRUMENT YANKAUER BULBOUS TIP W/O VENT (50EA/CA)

## (undated) DEVICE — PACK TOTAL HIP - (1/CA)

## (undated) DEVICE — LENS/HOOD FOR SPACESUIT - (32/PK) PEEL AWAY FACE

## (undated) DEVICE — SODIUM CHL. IRRIGATION 0.9% 3000ML (4EA/CA 65CA/PF)

## (undated) DEVICE — DISPOSABLE WOUND VAC PICO 10 X 30 CM - WOUND CARE (3/CA)

## (undated) DEVICE — TOWEL STOP TIMEOUT SAFETY FLAG (40EA/CA)

## (undated) DEVICE — SCULPS CEMENT (USE W/BONE - GLUE) 2/PK 6PK/BX

## (undated) DEVICE — Device

## (undated) DEVICE — MIXER BONE CEMENT REVOLUTION - W/FEMORAL PRESSURIZER (6/CA)

## (undated) DEVICE — HANDPIECE 10FT INTPLS SCT PLS IRRIGATION HAND CONTROL SET (6/PK)

## (undated) DEVICE — LACTATED RINGERS INJ 1000 ML - (14EA/CA 60CA/PF)

## (undated) DEVICE — IMPLANT FLEXIBLE DRILL 25MM (1EA)

## (undated) DEVICE — SUTURE GENERAL

## (undated) DEVICE — BLADE RECIP 77.5 X 11.2 X .76MM (1/EA)

## (undated) DEVICE — GLOVE BIOGEL PI ULTRATOUCH SZ 7.5 SURGICAL PF LF -(50/BX 4BX/CA)